# Patient Record
Sex: MALE | Race: ASIAN | NOT HISPANIC OR LATINO | Employment: UNEMPLOYED
[De-identification: names, ages, dates, MRNs, and addresses within clinical notes are randomized per-mention and may not be internally consistent; named-entity substitution may affect disease eponyms.]

---

## 2017-01-18 ENCOUNTER — RECORDS - HEALTHEAST (OUTPATIENT)
Dept: ADMINISTRATIVE | Facility: OTHER | Age: 27
End: 2017-01-18

## 2017-02-15 ENCOUNTER — RECORDS - HEALTHEAST (OUTPATIENT)
Dept: ADMINISTRATIVE | Facility: OTHER | Age: 27
End: 2017-02-15

## 2017-02-24 ENCOUNTER — COMMUNICATION - HEALTHEAST (OUTPATIENT)
Dept: FAMILY MEDICINE | Facility: CLINIC | Age: 27
End: 2017-02-24

## 2017-02-24 DIAGNOSIS — E55.9 UNSPECIFIED VITAMIN D DEFICIENCY: ICD-10-CM

## 2017-03-15 ENCOUNTER — RECORDS - HEALTHEAST (OUTPATIENT)
Dept: ADMINISTRATIVE | Facility: OTHER | Age: 27
End: 2017-03-15

## 2017-03-19 ENCOUNTER — COMMUNICATION - HEALTHEAST (OUTPATIENT)
Dept: FAMILY MEDICINE | Facility: CLINIC | Age: 27
End: 2017-03-19

## 2017-03-19 DIAGNOSIS — M79.2 NEUROPATHIC PAIN: ICD-10-CM

## 2017-03-26 ENCOUNTER — COMMUNICATION - HEALTHEAST (OUTPATIENT)
Dept: FAMILY MEDICINE | Facility: CLINIC | Age: 27
End: 2017-03-26

## 2017-03-26 DIAGNOSIS — Z91.09 OTHER ALLERGY, OTHER THAN TO MEDICINAL AGENTS: ICD-10-CM

## 2017-04-18 ENCOUNTER — RECORDS - HEALTHEAST (OUTPATIENT)
Dept: ADMINISTRATIVE | Facility: OTHER | Age: 27
End: 2017-04-18

## 2017-05-22 ENCOUNTER — RECORDS - HEALTHEAST (OUTPATIENT)
Dept: ADMINISTRATIVE | Facility: OTHER | Age: 27
End: 2017-05-22

## 2017-07-08 ENCOUNTER — COMMUNICATION - HEALTHEAST (OUTPATIENT)
Dept: FAMILY MEDICINE | Facility: CLINIC | Age: 27
End: 2017-07-08

## 2017-07-08 DIAGNOSIS — Z91.09 OTHER ALLERGY, OTHER THAN TO MEDICINAL AGENTS: ICD-10-CM

## 2017-07-17 ENCOUNTER — RECORDS - HEALTHEAST (OUTPATIENT)
Dept: ADMINISTRATIVE | Facility: OTHER | Age: 27
End: 2017-07-17

## 2017-07-24 ENCOUNTER — RECORDS - HEALTHEAST (OUTPATIENT)
Dept: ADMINISTRATIVE | Facility: OTHER | Age: 27
End: 2017-07-24

## 2017-08-07 ENCOUNTER — COMMUNICATION - HEALTHEAST (OUTPATIENT)
Dept: FAMILY MEDICINE | Facility: CLINIC | Age: 27
End: 2017-08-07

## 2017-08-07 DIAGNOSIS — M79.2 NEUROPATHIC PAIN: ICD-10-CM

## 2017-10-12 ENCOUNTER — COMMUNICATION - HEALTHEAST (OUTPATIENT)
Dept: FAMILY MEDICINE | Facility: CLINIC | Age: 27
End: 2017-10-12

## 2017-10-12 DIAGNOSIS — M79.2 NEUROPATHIC PAIN: ICD-10-CM

## 2017-10-29 ENCOUNTER — RECORDS - HEALTHEAST (OUTPATIENT)
Dept: ADMINISTRATIVE | Facility: OTHER | Age: 27
End: 2017-10-29

## 2017-12-14 ENCOUNTER — COMMUNICATION - HEALTHEAST (OUTPATIENT)
Dept: FAMILY MEDICINE | Facility: CLINIC | Age: 27
End: 2017-12-14

## 2017-12-14 DIAGNOSIS — M79.2 NEUROPATHIC PAIN: ICD-10-CM

## 2017-12-25 ENCOUNTER — COMMUNICATION - HEALTHEAST (OUTPATIENT)
Dept: FAMILY MEDICINE | Facility: CLINIC | Age: 27
End: 2017-12-25

## 2017-12-25 DIAGNOSIS — J30.9 AR (ALLERGIC RHINITIS): ICD-10-CM

## 2018-02-14 ENCOUNTER — COMMUNICATION - HEALTHEAST (OUTPATIENT)
Dept: FAMILY MEDICINE | Facility: CLINIC | Age: 28
End: 2018-02-14

## 2018-02-14 DIAGNOSIS — M79.2 NEUROPATHIC PAIN: ICD-10-CM

## 2018-02-19 ENCOUNTER — COMMUNICATION - HEALTHEAST (OUTPATIENT)
Dept: FAMILY MEDICINE | Facility: CLINIC | Age: 28
End: 2018-02-19

## 2018-02-19 ENCOUNTER — RECORDS - HEALTHEAST (OUTPATIENT)
Dept: ADMINISTRATIVE | Facility: OTHER | Age: 28
End: 2018-02-19

## 2018-04-14 ENCOUNTER — COMMUNICATION - HEALTHEAST (OUTPATIENT)
Dept: FAMILY MEDICINE | Facility: CLINIC | Age: 28
End: 2018-04-14

## 2018-04-14 DIAGNOSIS — M79.2 NEUROPATHIC PAIN: ICD-10-CM

## 2018-05-19 ENCOUNTER — COMMUNICATION - HEALTHEAST (OUTPATIENT)
Dept: FAMILY MEDICINE | Facility: CLINIC | Age: 28
End: 2018-05-19

## 2018-05-19 DIAGNOSIS — M79.2 NEUROPATHIC PAIN: ICD-10-CM

## 2018-05-21 ENCOUNTER — COMMUNICATION - HEALTHEAST (OUTPATIENT)
Dept: FAMILY MEDICINE | Facility: CLINIC | Age: 28
End: 2018-05-21

## 2018-07-26 ENCOUNTER — COMMUNICATION - HEALTHEAST (OUTPATIENT)
Dept: FAMILY MEDICINE | Facility: CLINIC | Age: 28
End: 2018-07-26

## 2018-07-26 DIAGNOSIS — J30.9 AR (ALLERGIC RHINITIS): ICD-10-CM

## 2018-08-14 ENCOUNTER — RECORDS - HEALTHEAST (OUTPATIENT)
Dept: ADMINISTRATIVE | Facility: OTHER | Age: 28
End: 2018-08-14

## 2018-09-25 ENCOUNTER — HOSPITAL ENCOUNTER (OUTPATIENT)
Dept: MRI IMAGING | Facility: CLINIC | Age: 28
Discharge: HOME | End: 2018-09-25
Attending: PSYCHIATRY & NEUROLOGY

## 2018-09-25 ENCOUNTER — HOSPITAL ENCOUNTER (OUTPATIENT)
Dept: RADIOLOGY | Facility: CLINIC | Age: 28
Discharge: HOME | End: 2018-09-25
Attending: PSYCHIATRY & NEUROLOGY

## 2018-09-25 DIAGNOSIS — G95.0 SYRINX OF SPINAL CORD (H): ICD-10-CM

## 2018-09-25 DIAGNOSIS — G91.9 HYDROCEPHALUS (H): ICD-10-CM

## 2018-09-26 ENCOUNTER — COMMUNICATION - HEALTHEAST (OUTPATIENT)
Dept: NEUROSURGERY | Facility: CLINIC | Age: 28
End: 2018-09-26

## 2018-10-09 ENCOUNTER — OFFICE VISIT - HEALTHEAST (OUTPATIENT)
Dept: NEUROSURGERY | Facility: CLINIC | Age: 28
End: 2018-10-09

## 2018-10-09 DIAGNOSIS — M54.2 NECK PAIN: ICD-10-CM

## 2018-10-09 ASSESSMENT — MIFFLIN-ST. JEOR: SCORE: 1575

## 2019-05-21 ENCOUNTER — COMMUNICATION - HEALTHEAST (OUTPATIENT)
Dept: NEUROSURGERY | Facility: CLINIC | Age: 29
End: 2019-05-21

## 2019-05-21 ENCOUNTER — AMBULATORY - HEALTHEAST (OUTPATIENT)
Dept: NEUROSURGERY | Facility: CLINIC | Age: 29
End: 2019-05-21

## 2019-05-21 DIAGNOSIS — G95.0 SYRINGOMYELIA AND SYRINGOBULBIA (H): ICD-10-CM

## 2019-06-05 ENCOUNTER — COMMUNICATION - HEALTHEAST (OUTPATIENT)
Dept: NEUROSURGERY | Facility: CLINIC | Age: 29
End: 2019-06-05

## 2019-06-24 ENCOUNTER — HOSPITAL ENCOUNTER (OUTPATIENT)
Dept: CT IMAGING | Facility: CLINIC | Age: 29
Discharge: HOME | End: 2019-06-24
Attending: NEUROLOGICAL SURGERY

## 2019-06-24 ENCOUNTER — HOSPITAL ENCOUNTER (OUTPATIENT)
Dept: RADIOLOGY | Facility: CLINIC | Age: 29
Discharge: HOME | End: 2019-06-24
Attending: NEUROLOGICAL SURGERY

## 2019-06-24 ENCOUNTER — HOSPITAL ENCOUNTER (OUTPATIENT)
Dept: MRI IMAGING | Facility: CLINIC | Age: 29
Discharge: HOME | End: 2019-06-24
Attending: NEUROLOGICAL SURGERY

## 2019-06-24 DIAGNOSIS — G95.0 SYRINGOMYELIA AND SYRINGOBULBIA (H): ICD-10-CM

## 2019-06-25 ENCOUNTER — OFFICE VISIT - HEALTHEAST (OUTPATIENT)
Dept: NEUROSURGERY | Facility: CLINIC | Age: 29
End: 2019-06-25

## 2019-06-25 DIAGNOSIS — Z98.2 S/P VP SHUNT: ICD-10-CM

## 2019-06-25 DIAGNOSIS — G95.0 SYRINX (H): ICD-10-CM

## 2019-09-23 ENCOUNTER — COMMUNICATION - HEALTHEAST (OUTPATIENT)
Dept: FAMILY MEDICINE | Facility: CLINIC | Age: 29
End: 2019-09-23

## 2019-09-23 ENCOUNTER — HOSPITAL ENCOUNTER (OUTPATIENT)
Dept: CT IMAGING | Facility: CLINIC | Age: 29
Discharge: HOME | End: 2019-09-23
Attending: NEUROLOGICAL SURGERY

## 2019-09-23 ENCOUNTER — HOSPITAL ENCOUNTER (OUTPATIENT)
Dept: RADIOLOGY | Facility: CLINIC | Age: 29
Discharge: HOME | End: 2019-09-23
Attending: NEUROLOGICAL SURGERY

## 2019-09-23 ENCOUNTER — HOSPITAL ENCOUNTER (OUTPATIENT)
Dept: MRI IMAGING | Facility: CLINIC | Age: 29
Discharge: HOME | End: 2019-09-23
Attending: NEUROLOGICAL SURGERY

## 2019-09-23 DIAGNOSIS — Z98.2 S/P VP SHUNT: ICD-10-CM

## 2019-09-23 DIAGNOSIS — G95.0 SYRINX (H): ICD-10-CM

## 2019-09-23 DIAGNOSIS — J30.9 AR (ALLERGIC RHINITIS): ICD-10-CM

## 2019-09-24 ENCOUNTER — OFFICE VISIT - HEALTHEAST (OUTPATIENT)
Dept: NEUROSURGERY | Facility: CLINIC | Age: 29
End: 2019-09-24

## 2019-09-24 DIAGNOSIS — G95.0 SYRINX (H): ICD-10-CM

## 2019-09-24 DIAGNOSIS — Z98.2 S/P VP SHUNT: ICD-10-CM

## 2019-09-24 ASSESSMENT — MIFFLIN-ST. JEOR: SCORE: 1575

## 2019-10-08 ENCOUNTER — OFFICE VISIT - HEALTHEAST (OUTPATIENT)
Dept: FAMILY MEDICINE | Facility: CLINIC | Age: 29
End: 2019-10-08

## 2019-10-08 DIAGNOSIS — H69.93 DYSFUNCTION OF BOTH EUSTACHIAN TUBES: ICD-10-CM

## 2019-10-08 DIAGNOSIS — F32.2 MAJOR DEPRESSIVE DISORDER, SINGLE EPISODE, SEVERE (H): ICD-10-CM

## 2019-10-08 DIAGNOSIS — J30.9 AR (ALLERGIC RHINITIS): ICD-10-CM

## 2019-10-08 DIAGNOSIS — F29 PSYCHOSIS, UNSPECIFIED PSYCHOSIS TYPE (H): ICD-10-CM

## 2019-10-08 DIAGNOSIS — E55.9 VITAMIN D DEFICIENCY: ICD-10-CM

## 2019-10-08 ASSESSMENT — MIFFLIN-ST. JEOR: SCORE: 1779.69

## 2019-10-08 ASSESSMENT — PATIENT HEALTH QUESTIONNAIRE - PHQ9: SUM OF ALL RESPONSES TO PHQ QUESTIONS 1-9: 20

## 2020-03-06 ENCOUNTER — OFFICE VISIT - HEALTHEAST (OUTPATIENT)
Dept: FAMILY MEDICINE | Facility: CLINIC | Age: 30
End: 2020-03-06

## 2020-03-06 DIAGNOSIS — N31.9 NEUROGENIC BLADDER: ICD-10-CM

## 2020-03-06 DIAGNOSIS — G95.0 SYRINGOMYELIA (H): ICD-10-CM

## 2020-03-06 DIAGNOSIS — M79.2 NEUROPATHIC PAIN: ICD-10-CM

## 2020-03-06 DIAGNOSIS — Q07.01 ARNOLD-CHIARI MALFORMATION, TYPE II (H): ICD-10-CM

## 2020-03-30 ENCOUNTER — COMMUNICATION - HEALTHEAST (OUTPATIENT)
Dept: FAMILY MEDICINE | Facility: CLINIC | Age: 30
End: 2020-03-30

## 2020-04-01 ENCOUNTER — RECORDS - HEALTHEAST (OUTPATIENT)
Dept: ADMINISTRATIVE | Facility: OTHER | Age: 30
End: 2020-04-01

## 2020-04-29 ENCOUNTER — COMMUNICATION - HEALTHEAST (OUTPATIENT)
Dept: FAMILY MEDICINE | Facility: CLINIC | Age: 30
End: 2020-04-29

## 2020-05-04 ENCOUNTER — RECORDS - HEALTHEAST (OUTPATIENT)
Dept: ADMINISTRATIVE | Facility: OTHER | Age: 30
End: 2020-05-04

## 2020-05-06 ENCOUNTER — COMMUNICATION - HEALTHEAST (OUTPATIENT)
Dept: FAMILY MEDICINE | Facility: CLINIC | Age: 30
End: 2020-05-06

## 2020-05-12 ENCOUNTER — OFFICE VISIT - HEALTHEAST (OUTPATIENT)
Dept: FAMILY MEDICINE | Facility: CLINIC | Age: 30
End: 2020-05-12

## 2020-05-12 DIAGNOSIS — M79.2 NEUROPATHIC PAIN: ICD-10-CM

## 2020-05-12 DIAGNOSIS — Q07.01 ARNOLD-CHIARI MALFORMATION, TYPE II (H): ICD-10-CM

## 2020-05-12 DIAGNOSIS — G95.0 SYRINGOMYELIA (H): ICD-10-CM

## 2020-05-12 ASSESSMENT — PATIENT HEALTH QUESTIONNAIRE - PHQ9: SUM OF ALL RESPONSES TO PHQ QUESTIONS 1-9: 9

## 2020-05-14 ENCOUNTER — RECORDS - HEALTHEAST (OUTPATIENT)
Dept: ADMINISTRATIVE | Facility: OTHER | Age: 30
End: 2020-05-14

## 2020-07-17 ENCOUNTER — HOME CARE/HOSPICE - HEALTHEAST (OUTPATIENT)
Dept: HOME HEALTH SERVICES | Facility: HOME HEALTH | Age: 30
End: 2020-07-17

## 2020-07-17 ENCOUNTER — OFFICE VISIT - HEALTHEAST (OUTPATIENT)
Dept: FAMILY MEDICINE | Facility: CLINIC | Age: 30
End: 2020-07-17

## 2020-07-17 DIAGNOSIS — E78.5 HYPERLIPIDEMIA, UNSPECIFIED HYPERLIPIDEMIA TYPE: ICD-10-CM

## 2020-07-17 DIAGNOSIS — M79.2 NEUROPATHIC PAIN: ICD-10-CM

## 2020-07-17 DIAGNOSIS — L84 CORN OR CALLUS: ICD-10-CM

## 2020-07-17 DIAGNOSIS — K60.2 ANAL FISSURE: ICD-10-CM

## 2020-07-17 DIAGNOSIS — K13.0 ANGULAR CHEILITIS: ICD-10-CM

## 2020-07-17 DIAGNOSIS — R74.01 ELEVATED ALANINE AMINOTRANSFERASE (ALT) LEVEL: ICD-10-CM

## 2020-07-17 DIAGNOSIS — F32.2 MAJOR DEPRESSIVE DISORDER, SINGLE EPISODE, SEVERE (H): ICD-10-CM

## 2020-07-17 DIAGNOSIS — R29.6 RECURRENT FALLS: ICD-10-CM

## 2020-07-17 DIAGNOSIS — D64.9 ANEMIA, UNSPECIFIED TYPE: ICD-10-CM

## 2020-07-17 DIAGNOSIS — L85.3 DRY SKIN: ICD-10-CM

## 2020-07-17 DIAGNOSIS — L29.9 PRURITUS, UNSPECIFIED: ICD-10-CM

## 2020-07-17 DIAGNOSIS — G95.0 SYRINGOMYELIA (H): ICD-10-CM

## 2020-07-17 DIAGNOSIS — E55.9 VITAMIN D DEFICIENCY: ICD-10-CM

## 2020-07-17 DIAGNOSIS — Q07.01 ARNOLD-CHIARI MALFORMATION, TYPE II (H): ICD-10-CM

## 2020-07-17 DIAGNOSIS — E74.89 OTHER SPECIFIED DISORDERS OF CARBOHYDRATE METABOLISM (H): ICD-10-CM

## 2020-07-17 DIAGNOSIS — Z00.00 ROUTINE GENERAL MEDICAL EXAMINATION AT A HEALTH CARE FACILITY: ICD-10-CM

## 2020-07-17 LAB
ALBUMIN SERPL-MCNC: 4.2 G/DL (ref 3.5–5)
ALP SERPL-CCNC: 73 U/L (ref 45–120)
ALT SERPL W P-5'-P-CCNC: 57 U/L (ref 0–45)
ANION GAP SERPL CALCULATED.3IONS-SCNC: 11 MMOL/L (ref 5–18)
AST SERPL W P-5'-P-CCNC: 39 U/L (ref 0–40)
BILIRUB SERPL-MCNC: 0.3 MG/DL (ref 0–1)
BUN SERPL-MCNC: 14 MG/DL (ref 8–22)
CALCIUM SERPL-MCNC: 10.4 MG/DL (ref 8.5–10.5)
CHLORIDE BLD-SCNC: 103 MMOL/L (ref 98–107)
CHOLEST SERPL-MCNC: 292 MG/DL
CO2 SERPL-SCNC: 28 MMOL/L (ref 22–31)
CREAT SERPL-MCNC: 0.98 MG/DL (ref 0.7–1.3)
ERYTHROCYTE [DISTWIDTH] IN BLOOD BY AUTOMATED COUNT: 12.1 % (ref 11–14.5)
FASTING STATUS PATIENT QL REPORTED: NO
GFR SERPL CREATININE-BSD FRML MDRD: >60 ML/MIN/1.73M2
GLUCOSE BLD-MCNC: 107 MG/DL (ref 70–125)
HBA1C MFR BLD: 5.6 % (ref 3.5–6)
HCT VFR BLD AUTO: 43.8 % (ref 40–54)
HDLC SERPL-MCNC: 65 MG/DL
HGB BLD-MCNC: 14.6 G/DL (ref 14–18)
LDLC SERPL CALC-MCNC: 196 MG/DL
MCH RBC QN AUTO: 28.1 PG (ref 27–34)
MCHC RBC AUTO-ENTMCNC: 33.4 G/DL (ref 32–36)
MCV RBC AUTO: 84 FL (ref 80–100)
PLATELET # BLD AUTO: 330 THOU/UL (ref 140–440)
PMV BLD AUTO: 7.3 FL (ref 7–10)
POTASSIUM BLD-SCNC: 5.1 MMOL/L (ref 3.5–5)
PROT SERPL-MCNC: 8 G/DL (ref 6–8)
RBC # BLD AUTO: 5.2 MILL/UL (ref 4.4–6.2)
SODIUM SERPL-SCNC: 142 MMOL/L (ref 136–145)
TRIGL SERPL-MCNC: 153 MG/DL
TSH SERPL DL<=0.005 MIU/L-ACNC: 4.69 UIU/ML (ref 0.3–5)
WBC: 8.5 THOU/UL (ref 4–11)

## 2020-07-17 ASSESSMENT — PATIENT HEALTH QUESTIONNAIRE - PHQ9: SUM OF ALL RESPONSES TO PHQ QUESTIONS 1-9: 19

## 2020-07-20 LAB — 25(OH)D3 SERPL-MCNC: 27.2 NG/ML (ref 30–80)

## 2020-07-21 ENCOUNTER — OFFICE VISIT - HEALTHEAST (OUTPATIENT)
Dept: PODIATRY | Facility: CLINIC | Age: 30
End: 2020-07-21

## 2020-07-21 ENCOUNTER — COMMUNICATION - HEALTHEAST (OUTPATIENT)
Dept: FAMILY MEDICINE | Facility: CLINIC | Age: 30
End: 2020-07-21

## 2020-07-21 DIAGNOSIS — G58.9 MONONEURITIS: ICD-10-CM

## 2020-07-21 DIAGNOSIS — G58.9 COMPRESSION NEUROPATHY: ICD-10-CM

## 2020-07-21 DIAGNOSIS — G57.53 TARSAL TUNNEL SYNDROME OF BOTH LOWER EXTREMITIES: ICD-10-CM

## 2020-07-22 ENCOUNTER — COMMUNICATION - HEALTHEAST (OUTPATIENT)
Dept: HOME HEALTH SERVICES | Facility: HOME HEALTH | Age: 30
End: 2020-07-22

## 2020-07-22 ENCOUNTER — HOME CARE/HOSPICE - HEALTHEAST (OUTPATIENT)
Dept: HOME HEALTH SERVICES | Facility: HOME HEALTH | Age: 30
End: 2020-07-22

## 2020-07-23 ENCOUNTER — OFFICE VISIT - HEALTHEAST (OUTPATIENT)
Dept: FAMILY MEDICINE | Facility: CLINIC | Age: 30
End: 2020-07-23

## 2020-07-23 ENCOUNTER — COMMUNICATION - HEALTHEAST (OUTPATIENT)
Dept: HOME HEALTH SERVICES | Facility: HOME HEALTH | Age: 30
End: 2020-07-23

## 2020-07-23 DIAGNOSIS — E87.5 HYPERKALEMIA: ICD-10-CM

## 2020-07-23 DIAGNOSIS — E78.5 HYPERLIPIDEMIA, UNSPECIFIED HYPERLIPIDEMIA TYPE: ICD-10-CM

## 2020-07-23 DIAGNOSIS — R79.89 ABNORMAL LFTS (LIVER FUNCTION TESTS): ICD-10-CM

## 2020-07-24 ENCOUNTER — HOME CARE/HOSPICE - HEALTHEAST (OUTPATIENT)
Dept: HOME HEALTH SERVICES | Facility: HOME HEALTH | Age: 30
End: 2020-07-24

## 2020-07-25 ENCOUNTER — COMMUNICATION - HEALTHEAST (OUTPATIENT)
Dept: HOME HEALTH SERVICES | Facility: HOME HEALTH | Age: 30
End: 2020-07-25

## 2020-07-26 ENCOUNTER — HOME CARE/HOSPICE - HEALTHEAST (OUTPATIENT)
Dept: HOME HEALTH SERVICES | Facility: HOME HEALTH | Age: 30
End: 2020-07-26

## 2020-07-27 ENCOUNTER — HOME CARE/HOSPICE - HEALTHEAST (OUTPATIENT)
Dept: HOME HEALTH SERVICES | Facility: HOME HEALTH | Age: 30
End: 2020-07-27

## 2020-07-28 ENCOUNTER — HOME CARE/HOSPICE - HEALTHEAST (OUTPATIENT)
Dept: HOME HEALTH SERVICES | Facility: HOME HEALTH | Age: 30
End: 2020-07-28

## 2020-07-28 ENCOUNTER — COMMUNICATION - HEALTHEAST (OUTPATIENT)
Dept: HOME HEALTH SERVICES | Facility: HOME HEALTH | Age: 30
End: 2020-07-28

## 2020-07-28 ENCOUNTER — AMBULATORY - HEALTHEAST (OUTPATIENT)
Dept: FAMILY MEDICINE | Facility: CLINIC | Age: 30
End: 2020-07-28

## 2020-07-28 DIAGNOSIS — Z79.899 HIGH RISK MEDICATION USE: ICD-10-CM

## 2020-07-29 ENCOUNTER — HOME CARE/HOSPICE - HEALTHEAST (OUTPATIENT)
Dept: HOME HEALTH SERVICES | Facility: HOME HEALTH | Age: 30
End: 2020-07-29

## 2020-07-30 ENCOUNTER — HOME CARE/HOSPICE - HEALTHEAST (OUTPATIENT)
Dept: HOME HEALTH SERVICES | Facility: HOME HEALTH | Age: 30
End: 2020-07-30

## 2020-07-31 ENCOUNTER — HOME CARE/HOSPICE - HEALTHEAST (OUTPATIENT)
Dept: HOME HEALTH SERVICES | Facility: HOME HEALTH | Age: 30
End: 2020-07-31

## 2020-08-03 ENCOUNTER — HOME CARE/HOSPICE - HEALTHEAST (OUTPATIENT)
Dept: HOME HEALTH SERVICES | Facility: HOME HEALTH | Age: 30
End: 2020-08-03

## 2020-08-04 ENCOUNTER — HOME CARE/HOSPICE - HEALTHEAST (OUTPATIENT)
Dept: HOME HEALTH SERVICES | Facility: HOME HEALTH | Age: 30
End: 2020-08-04

## 2020-08-05 ENCOUNTER — AMBULATORY - HEALTHEAST (OUTPATIENT)
Dept: LAB | Facility: CLINIC | Age: 30
End: 2020-08-05

## 2020-08-05 ENCOUNTER — HOME CARE/HOSPICE - HEALTHEAST (OUTPATIENT)
Dept: HOME HEALTH SERVICES | Facility: HOME HEALTH | Age: 30
End: 2020-08-05

## 2020-08-05 DIAGNOSIS — E55.9 VITAMIN D DEFICIENCY: ICD-10-CM

## 2020-08-05 DIAGNOSIS — R74.01 ELEVATED ALANINE AMINOTRANSFERASE (ALT) LEVEL: ICD-10-CM

## 2020-08-05 LAB
ALBUMIN SERPL-MCNC: 4.1 G/DL (ref 3.5–5)
ALP SERPL-CCNC: 66 U/L (ref 45–120)
ALT SERPL W P-5'-P-CCNC: 22 U/L (ref 0–45)
ANION GAP SERPL CALCULATED.3IONS-SCNC: 12 MMOL/L (ref 5–18)
AST SERPL W P-5'-P-CCNC: 19 U/L (ref 0–40)
BILIRUB SERPL-MCNC: 0.4 MG/DL (ref 0–1)
BUN SERPL-MCNC: 12 MG/DL (ref 8–22)
CALCIUM SERPL-MCNC: 10.1 MG/DL (ref 8.5–10.5)
CHLORIDE BLD-SCNC: 103 MMOL/L (ref 98–107)
CO2 SERPL-SCNC: 26 MMOL/L (ref 22–31)
CREAT SERPL-MCNC: 0.81 MG/DL (ref 0.7–1.3)
GFR SERPL CREATININE-BSD FRML MDRD: >60 ML/MIN/1.73M2
GGT SERPL-CCNC: 33 U/L (ref 0–50)
GLUCOSE BLD-MCNC: 114 MG/DL (ref 70–125)
POTASSIUM BLD-SCNC: 4.7 MMOL/L (ref 3.5–5)
PROT SERPL-MCNC: 7.5 G/DL (ref 6–8)
SODIUM SERPL-SCNC: 141 MMOL/L (ref 136–145)

## 2020-08-06 ENCOUNTER — COMMUNICATION - HEALTHEAST (OUTPATIENT)
Dept: FAMILY MEDICINE | Facility: CLINIC | Age: 30
End: 2020-08-06

## 2020-08-06 ENCOUNTER — HOME CARE/HOSPICE - HEALTHEAST (OUTPATIENT)
Dept: HOME HEALTH SERVICES | Facility: HOME HEALTH | Age: 30
End: 2020-08-06

## 2020-08-06 DIAGNOSIS — E55.9 VITAMIN D DEFICIENCY: ICD-10-CM

## 2020-08-06 LAB — 25(OH)D3 SERPL-MCNC: 22.3 NG/ML (ref 30–80)

## 2020-08-07 ENCOUNTER — HOME CARE/HOSPICE - HEALTHEAST (OUTPATIENT)
Dept: HOME HEALTH SERVICES | Facility: HOME HEALTH | Age: 30
End: 2020-08-07

## 2020-08-10 ENCOUNTER — HOME CARE/HOSPICE - HEALTHEAST (OUTPATIENT)
Dept: HOME HEALTH SERVICES | Facility: HOME HEALTH | Age: 30
End: 2020-08-10

## 2020-08-13 ENCOUNTER — HOME CARE/HOSPICE - HEALTHEAST (OUTPATIENT)
Dept: HOME HEALTH SERVICES | Facility: HOME HEALTH | Age: 30
End: 2020-08-13

## 2020-08-14 ENCOUNTER — HOME CARE/HOSPICE - HEALTHEAST (OUTPATIENT)
Dept: HOME HEALTH SERVICES | Facility: HOME HEALTH | Age: 30
End: 2020-08-14

## 2020-08-14 ENCOUNTER — COMMUNICATION - HEALTHEAST (OUTPATIENT)
Dept: HOME HEALTH SERVICES | Facility: HOME HEALTH | Age: 30
End: 2020-08-14

## 2020-08-18 ENCOUNTER — HOME CARE/HOSPICE - HEALTHEAST (OUTPATIENT)
Dept: HOME HEALTH SERVICES | Facility: HOME HEALTH | Age: 30
End: 2020-08-18

## 2020-08-21 ENCOUNTER — HOME CARE/HOSPICE - HEALTHEAST (OUTPATIENT)
Dept: HOME HEALTH SERVICES | Facility: HOME HEALTH | Age: 30
End: 2020-08-21

## 2020-08-24 ENCOUNTER — HOME CARE/HOSPICE - HEALTHEAST (OUTPATIENT)
Dept: HOME HEALTH SERVICES | Facility: HOME HEALTH | Age: 30
End: 2020-08-24

## 2020-08-27 ENCOUNTER — HOME CARE/HOSPICE - HEALTHEAST (OUTPATIENT)
Dept: HOME HEALTH SERVICES | Facility: HOME HEALTH | Age: 30
End: 2020-08-27

## 2020-08-31 ENCOUNTER — HOME CARE/HOSPICE - HEALTHEAST (OUTPATIENT)
Dept: HOME HEALTH SERVICES | Facility: HOME HEALTH | Age: 30
End: 2020-08-31

## 2020-09-01 ENCOUNTER — COMMUNICATION - HEALTHEAST (OUTPATIENT)
Dept: HOME HEALTH SERVICES | Facility: HOME HEALTH | Age: 30
End: 2020-09-01

## 2020-09-03 ENCOUNTER — HOME CARE/HOSPICE - HEALTHEAST (OUTPATIENT)
Dept: HOME HEALTH SERVICES | Facility: HOME HEALTH | Age: 30
End: 2020-09-03

## 2020-09-08 ENCOUNTER — HOME CARE/HOSPICE - HEALTHEAST (OUTPATIENT)
Dept: HOME HEALTH SERVICES | Facility: HOME HEALTH | Age: 30
End: 2020-09-08

## 2020-09-11 ENCOUNTER — HOME CARE/HOSPICE - HEALTHEAST (OUTPATIENT)
Dept: HOME HEALTH SERVICES | Facility: HOME HEALTH | Age: 30
End: 2020-09-11

## 2020-10-25 ENCOUNTER — COMMUNICATION - HEALTHEAST (OUTPATIENT)
Dept: FAMILY MEDICINE | Facility: CLINIC | Age: 30
End: 2020-10-25

## 2020-10-25 DIAGNOSIS — E55.9 VITAMIN D DEFICIENCY: ICD-10-CM

## 2020-11-13 ENCOUNTER — COMMUNICATION - HEALTHEAST (OUTPATIENT)
Dept: FAMILY MEDICINE | Facility: CLINIC | Age: 30
End: 2020-11-13

## 2020-11-13 DIAGNOSIS — J30.9 AR (ALLERGIC RHINITIS): ICD-10-CM

## 2020-12-15 ENCOUNTER — COMMUNICATION - HEALTHEAST (OUTPATIENT)
Dept: NEUROSURGERY | Facility: CLINIC | Age: 30
End: 2020-12-15

## 2020-12-15 DIAGNOSIS — Z98.2 S/P VP SHUNT: ICD-10-CM

## 2021-01-05 ENCOUNTER — HOSPITAL ENCOUNTER (OUTPATIENT)
Dept: RADIOLOGY | Facility: HOSPITAL | Age: 31
Discharge: HOME | End: 2021-01-05
Attending: NEUROLOGICAL SURGERY

## 2021-01-05 ENCOUNTER — HOSPITAL ENCOUNTER (OUTPATIENT)
Dept: CT IMAGING | Facility: HOSPITAL | Age: 31
Discharge: HOME | End: 2021-01-05
Attending: NEUROLOGICAL SURGERY

## 2021-01-05 DIAGNOSIS — Z98.2 S/P VP SHUNT: ICD-10-CM

## 2021-05-26 VITALS — DIASTOLIC BLOOD PRESSURE: 80 MMHG | SYSTOLIC BLOOD PRESSURE: 132 MMHG | HEART RATE: 96 BPM

## 2021-05-26 VITALS
OXYGEN SATURATION: 98 % | SYSTOLIC BLOOD PRESSURE: 128 MMHG | DIASTOLIC BLOOD PRESSURE: 90 MMHG | TEMPERATURE: 98.7 F | HEART RATE: 90 BPM

## 2021-05-26 VITALS
OXYGEN SATURATION: 94 % | HEART RATE: 83 BPM | TEMPERATURE: 98 F | DIASTOLIC BLOOD PRESSURE: 93 MMHG | SYSTOLIC BLOOD PRESSURE: 144 MMHG

## 2021-05-26 VITALS
DIASTOLIC BLOOD PRESSURE: 74 MMHG | SYSTOLIC BLOOD PRESSURE: 117 MMHG | HEART RATE: 85 BPM | OXYGEN SATURATION: 92 % | TEMPERATURE: 97.5 F

## 2021-05-26 VITALS
HEART RATE: 95 BPM | DIASTOLIC BLOOD PRESSURE: 80 MMHG | TEMPERATURE: 98 F | SYSTOLIC BLOOD PRESSURE: 115 MMHG | OXYGEN SATURATION: 95 %

## 2021-05-26 VITALS
DIASTOLIC BLOOD PRESSURE: 94 MMHG | SYSTOLIC BLOOD PRESSURE: 141 MMHG | TEMPERATURE: 97 F | OXYGEN SATURATION: 95 % | HEART RATE: 82 BPM

## 2021-05-26 VITALS
OXYGEN SATURATION: 97 % | TEMPERATURE: 98 F | DIASTOLIC BLOOD PRESSURE: 93 MMHG | HEART RATE: 82 BPM | SYSTOLIC BLOOD PRESSURE: 126 MMHG

## 2021-05-26 ASSESSMENT — PATIENT HEALTH QUESTIONNAIRE - PHQ9
SUM OF ALL RESPONSES TO PHQ QUESTIONS 1-9: 20
SUM OF ALL RESPONSES TO PHQ QUESTIONS 1-9: 9

## 2021-05-27 VITALS
SYSTOLIC BLOOD PRESSURE: 119 MMHG | RESPIRATION RATE: 18 BRPM | DIASTOLIC BLOOD PRESSURE: 67 MMHG | HEART RATE: 76 BPM | OXYGEN SATURATION: 98 %

## 2021-05-27 VITALS
HEART RATE: 76 BPM | RESPIRATION RATE: 20 BRPM | TEMPERATURE: 98.4 F | DIASTOLIC BLOOD PRESSURE: 98 MMHG | SYSTOLIC BLOOD PRESSURE: 130 MMHG

## 2021-05-27 VITALS
SYSTOLIC BLOOD PRESSURE: 121 MMHG | HEART RATE: 85 BPM | OXYGEN SATURATION: 96 % | TEMPERATURE: 97.1 F | DIASTOLIC BLOOD PRESSURE: 64 MMHG

## 2021-05-27 VITALS
TEMPERATURE: 98 F | HEART RATE: 94 BPM | DIASTOLIC BLOOD PRESSURE: 86 MMHG | OXYGEN SATURATION: 95 % | SYSTOLIC BLOOD PRESSURE: 125 MMHG

## 2021-05-27 VITALS
DIASTOLIC BLOOD PRESSURE: 69 MMHG | OXYGEN SATURATION: 97 % | SYSTOLIC BLOOD PRESSURE: 110 MMHG | TEMPERATURE: 98.9 F | HEART RATE: 104 BPM

## 2021-05-27 VITALS
OXYGEN SATURATION: 97 % | SYSTOLIC BLOOD PRESSURE: 117 MMHG | TEMPERATURE: 98 F | DIASTOLIC BLOOD PRESSURE: 88 MMHG | HEART RATE: 47 BPM

## 2021-05-27 VITALS
OXYGEN SATURATION: 97 % | TEMPERATURE: 99 F | SYSTOLIC BLOOD PRESSURE: 124 MMHG | HEART RATE: 87 BPM | DIASTOLIC BLOOD PRESSURE: 93 MMHG

## 2021-05-27 VITALS
SYSTOLIC BLOOD PRESSURE: 114 MMHG | TEMPERATURE: 98 F | HEART RATE: 78 BPM | DIASTOLIC BLOOD PRESSURE: 92 MMHG | OXYGEN SATURATION: 97 %

## 2021-05-27 VITALS
DIASTOLIC BLOOD PRESSURE: 74 MMHG | HEART RATE: 85 BPM | RESPIRATION RATE: 18 BRPM | SYSTOLIC BLOOD PRESSURE: 129 MMHG | OXYGEN SATURATION: 96 %

## 2021-05-27 VITALS
DIASTOLIC BLOOD PRESSURE: 85 MMHG | HEART RATE: 80 BPM | TEMPERATURE: 98.4 F | SYSTOLIC BLOOD PRESSURE: 120 MMHG | OXYGEN SATURATION: 98 %

## 2021-05-27 VITALS
HEART RATE: 109 BPM | SYSTOLIC BLOOD PRESSURE: 136 MMHG | DIASTOLIC BLOOD PRESSURE: 86 MMHG | OXYGEN SATURATION: 94 % | TEMPERATURE: 98 F

## 2021-05-27 VITALS
TEMPERATURE: 97.2 F | DIASTOLIC BLOOD PRESSURE: 85 MMHG | SYSTOLIC BLOOD PRESSURE: 118 MMHG | HEART RATE: 83 BPM | RESPIRATION RATE: 14 BRPM | OXYGEN SATURATION: 100 %

## 2021-05-27 VITALS
TEMPERATURE: 98.4 F | DIASTOLIC BLOOD PRESSURE: 88 MMHG | HEART RATE: 84 BPM | OXYGEN SATURATION: 98 % | SYSTOLIC BLOOD PRESSURE: 118 MMHG

## 2021-05-27 VITALS
DIASTOLIC BLOOD PRESSURE: 89 MMHG | SYSTOLIC BLOOD PRESSURE: 115 MMHG | OXYGEN SATURATION: 98 % | TEMPERATURE: 98.6 F | HEART RATE: 84 BPM

## 2021-05-27 VITALS — OXYGEN SATURATION: 95 % | HEART RATE: 81 BPM | TEMPERATURE: 98 F

## 2021-05-27 VITALS
OXYGEN SATURATION: 98 % | RESPIRATION RATE: 18 BRPM | HEART RATE: 82 BPM | SYSTOLIC BLOOD PRESSURE: 110 MMHG | DIASTOLIC BLOOD PRESSURE: 73 MMHG

## 2021-05-27 ASSESSMENT — PATIENT HEALTH QUESTIONNAIRE - PHQ9: SUM OF ALL RESPONSES TO PHQ QUESTIONS 1-9: 19

## 2021-05-29 NOTE — TELEPHONE ENCOUNTER
Breezy called on behalf of his brother Pacheco to set up an appointment to see Dr. Easton because he is having headaches and neck pain. I made an appointment for 6/25/19 with Dr. Easton. Please order any imaging needed for the upcoming appointment. Please call Breezy with any questions. Best number to reach: [phone]

## 2021-05-29 NOTE — TELEPHONE ENCOUNTER
Phoned in Valium 5 mg #2 to take 1 tab PO 1 hour prior to MRI, may repeat another 1 tab 30 minutes prior to test prn.   Aida Marques LPN

## 2021-05-30 NOTE — PROGRESS NOTES
Dear Dr. Nicolas:    I had the pleasure seeing Pacheco David in follow-up in my neurosurgery clinic.  Pacheco is been having difficulty with balance issues more than his baseline.  He also states that during the wintertime he has pain along his neck where the catheter can be palpated.    On physical exam patient is very pleasant and appropriate  Speech is clear fluent,  and appropriate but slow  Face is symmetric throughout the forehead eyes and mouth  Extraocular muscles are intact bilaterally  Strength is full throughout the upper and lower extremities  Gait is widebased  Difficulty with tandem gait  Finger to nose touch is intact bilaterally  Gait is nonantalgic    CT of the head without contrast dated 6/24/2019 shows increase in caliber compared to 6/15/2015 but slightly decreased compared to 6/9/2015.  Per my review, no report from radiology is available for review, of the cervical spine and thoracic spine MRI the syringobulbia has decreased significantly in the cervical thoracic syrinx is decreased however compared to his last MRI from 2018 the syringe he does have increased slightly in size.  I will turn down out of shunt down to 0.5 and repeat a head CT and cervical and thoracic spine MRI without contrast in 3 months.   Thank you for giving me the opportunity to see this very pleasant patient.  If you have any questions about him or any other patients please feel free to contact me.  Of note I spent greater than 25 minutes counseling the patient regarding his pathology and treatment plan, greater than 50 percent of which was spent in direct counseling.     Michael Easton MD, FAANS

## 2021-05-30 NOTE — PROGRESS NOTES
Pacheco David has a history of Chiari decompression and  shunt placement. Also, shunt revision in 1/2015.  Last seen on 10/9/2018. Strata shunt setting at 1.0.  Today he returns in follow up with head CT, skull xrays, cervical and thoracic MRI.  He presents with a chief complaint of dizziness and gait disturbance. He gets not positional occipital HA. Denies any other neurological signs or symptoms. Speech is fluent. Cognition is intact. Uses a cane for safety.        PROCEDURE:  Reprogram Strata Shunt from 1.0 to 0.5.  Reservoir easily identified, using template valve position is marked.  Using Strata self verifying , shunt adjusted to 0.5.   Patient at neurologic baseline throughout procedure.      FINDINGS:  Shunt final setting at 0.5     COMPLICATIONS:  None    Jane Blas RN, CNRN

## 2021-06-01 NOTE — PROGRESS NOTES
Dear Dr. Nicolas:  I had the pleasure of seeing Pacheco David in follow-up in my neurosurgery clinic.  We had turned down his valve to is 0.5 and recently reevaluated his spinal syrinx.  His most recent MRI dated 9/23 showed decrease in the caliber of the previously seen syrinx status post Chiari decompression throughout his cervical thoracic spine.  Head CT showed stable ventricle size.  He denies any headaches but states that he has had progressively increasing pain along his shunt tract in his neck behind his ear and he hits his neck until the pain improves.    On physical exam patient is very pleasant and appropriate  Speech is clear slow but appropriate  Face is symmetric throughout the forehead eyes and mouth  Extraocular muscles are intact bilaterally  He ambulates with a walker  Gait is wide based  Shunt bubble depresses and refills well    Assessment and plan:  Pacheco is a very pleasant 28-year-old with shunted hydrocephalus and a Chiari decompression and holocord syrinx his syrinx is improving.  However given his pain along his shunt track I would like to do a evaluation of the shunt with an aspiration of fluid to send for studies as well as radionucleotide study to make sure his shunt is working optimally.  Once this is done I will review it and make recommendations as needed.  Of note our nurse did reset his shunt valve back to 0.5 from 1.5 after the MRI.   Thank you for giving me the opportunity to see this very pleasant patient.  If you have any questions about Pacheco or any other patients please feel free to contact me.  Of note I spent greater than 30 minutes counseling the patient regarding his pathology and treatment plan.    Michael Easton MD, FAANS

## 2021-06-01 NOTE — PROGRESS NOTES
PROCEDURE:  Verify Shunt at 0.5  Sour John easily identified, using template valve position is marked.  Using Strata self verifying , shunt confirmed at 0.5.   Patient at neurologic baseline throughout procedure.      FINDINGS:  Shunt final setting at 0.5     Jane Blas RN, CNRN

## 2021-06-01 NOTE — PROGRESS NOTES
Pacheco is here to f/u on imaging. He states he still has some pain on right side of neck and hits his neck until it goes numb and cannot feel pain anymore.   Quinton,CMA

## 2021-06-01 NOTE — TELEPHONE ENCOUNTER
RN cannot approve Refill Request    RN can NOT refill this medication Protocol failed and NO refill given.         Geneva Musa, [facility] Triage/Med Refill 9/23/2019    Requested Prescriptions   Pending Prescriptions Disp Refills     fluticasone propionate (FLONASE) 50 mcg/actuation nasal spray [Pharmacy Med Name: FLUTICASONE PROP 50 MCG SPR 50 KAYY] 16 g 10     Sig: USE 1 SPRAY IN EACH NOSTRIL EVERY DAY/ TXHUA HNUB TXAU 1 LWM CARLOS TXA SAB QHOV NTSWG       Nasal Steroid Refill Protocol Failed - 9/23/2019  9:36 AM        Failed - Patient has had office visit/physical in last 2 years     Last office visit with prescriber/PCP: Visit date not found OR same dept: Visit date not found OR same specialty: 11/3/2016 Lary Nicolas MD Last physical: Visit date not found Last MTM visit: Visit date not found    Next appt within 3 mo: Visit date not found  Next physical within 3 mo: Visit date not found  Prescriber OR PCP: Lary Nicolas MD  Last diagnosis associated with med order: 1. AR (allergic rhinitis)  - fluticasone propionate (FLONASE) 50 mcg/actuation nasal spray [Pharmacy Med Name: FLUTICASONE PROP 50 MCG SPR 50 KAYY]; USE 1 SPRAY IN EACH NOSTRIL EVERY DAY/ TXHUA HNUB TXAU 1 LWM CARLOS TXHUA SAB QHOV NTSWG  Dispense: 16 g; Refill: 10     If protocol passes may refill for 12 months if within 3 months of last provider visit (or a total of 15 months).

## 2021-06-02 VITALS — HEIGHT: 66 IN | BODY MASS INDEX: 23.46 KG/M2 | WEIGHT: 146 LBS

## 2021-06-02 NOTE — PROGRESS NOTES
Office Visit  [facility]  Date of Service: 10/08/2019      Subjective   Pacheco David is a 29 y.o. male who presents for referral    Sees therapist - Michael Segundo @ [facility].  Has been talking to therapist about medicine for depression.  Feeling depressed - a couple for a couple of years    Symptoms:   Poor sleep, but also sleeps too much sometimes  Overeating  Feels feels depressed  Feels tired  Feels guilt  Psychomotor slowing present  Hears laughing at night, sees vague things flying in peripheral vision, gets command hallucinations that tell him to go die or it's better off not living  Suicidal ideation: thinks about disappearing, jumping off a bridge, cutting wrists.  No past attempts.  No thoughts of hurting others.  Protective: family, pain level.  No alcohol or drug use.    History of medication: a long time ago, 17 yo. Doesn't know which one. Noticed that he felt really depressed really fast, hit himself. On abilify for pain, doesn't help  Pain clinic is prescribing Abilify -it is prescribed by Dr. Randhawa' NP Gloria    Health history is significant for having an Arnold-Chiari malformation with  shunt, syringomyelia, neuropathic pain/neuropathic bowel/neuropathic bladder.  He is on a controlled substance agreement for chronic pain through the pain clinic.    Allergies  Eustachian tube dysfunction  Flonase didn't help  Feels like a bubble in right ear    Little interest or pleasure in doing things: Several days  Feeling down, depressed, or hopeless: More than half the days  Trouble falling or staying asleep, or sleeping too much: Nearly every day  Feeling tired or having little energy: More than half the days  Poor appetite or overeating: Nearly every day  Feeling bad about yourself - or that you are a failure or have let yourself or your family down: Nearly every day  Trouble concentrating on things, such as reading the newspaper or watching television: More than half the  "days  Moving or speaking so slowly that other people could have noticed. Or the opposite - being so fidgety or restless that you have been moving around a lot more than usual: More than half the days  Thoughts that you would be better off dead, or of hurting yourself in some way: More than half the days  PHQ-9 Total Score: 20  If you checked off any problems, how difficult have these problems made it for you to do your work, take care of things at home, or get along with other people?: Very difficult    Objective   /70 (Patient Site: Left Arm, Patient Position: Sitting, Cuff Size: Adult Large)   Pulse 90   Temp 98  F (36.7  C) (Oral)   Resp 18   Ht 5' 5.75\" (1.67 m)   Wt 192 lb (87.1 kg)   BMI 31.23 kg/m     He reports that he has never smoked. He has never used smokeless tobacco.    Gen: Alert, no apparent distress.  Ears: canals clear, tympanic membranes clear bilateral clear effusion  Eyes: no conjunctivitis.   Sinuses: non-tender.  Nose: normal mucosa.   Mouth: adequate dentition.   Tonsils/oropharynx: no tonsillar hypertrophy, normal oropharynx.   Neck: no significant lymphadenopathy, thyroid not enlarged, not tender.    Heart: Regular rate and rhythm, no murmurs.  Lungs: Clear to auscultation bilaterally, no increased work of breathing.  Abdomen: Soft, non-tender, non-distended, bowel sounds normal.  Extremities: No clubbing, cyanosis, edema.    Results for orders placed or performed during the hospital encounter of 08/20/15   Hemoglobin   Result Value Ref Range    Hemoglobin 14.5 14.0 - 18.0 g/dL     No results found.    Assessment & Plan     1. Depression with psychosis and passive suicidal ideation.  Present for a couple of years.  Patient is interested in seeing a psychiatrist for medication management.  Currently on Abilify.  Long discussion with him today about plan for suicidal ideation.  That if he does feel like he might hurt himself he should call 911 and go to emergency room medially.  " He agrees that he will do this, should it become necessary.  Urgent referral made for psychiatry.  2. Eustachian tube dysfunction in both ears.  Prescription given for Nasonex.  Discussed how to use it.  Continue using Claritin daily.      Order Summary                                                      1. Major Depression Severe Single Episode  Ambulatory referral to Psychiatry   2. Psychosis, unspecified psychosis type (H)  Ambulatory referral to Psychiatry   3. AR (allergic rhinitis)  loratadine (CLARITIN) 10 mg tablet    mometasone (NASONEX) 50 mcg/actuation nasal spray   4. Vitamin D deficiency  cholecalciferol, vitamin D3, 5,000 unit capsule   5. Dysfunction of both eustachian tubes  mometasone (NASONEX) 50 mcg/actuation nasal spray      No future appointments.    Completed by: Lary Nicolas M.D., [facility]. 10/10/2019 4:37 PM.  This transcription uses voice recognition software, which may contain typographical errors.

## 2021-06-03 VITALS
HEIGHT: 66 IN | DIASTOLIC BLOOD PRESSURE: 61 MMHG | BODY MASS INDEX: 23.46 KG/M2 | OXYGEN SATURATION: 91 % | WEIGHT: 146 LBS | HEART RATE: 88 BPM | SYSTOLIC BLOOD PRESSURE: 93 MMHG

## 2021-06-03 VITALS
BODY MASS INDEX: 30.86 KG/M2 | RESPIRATION RATE: 18 BRPM | SYSTOLIC BLOOD PRESSURE: 110 MMHG | HEART RATE: 90 BPM | DIASTOLIC BLOOD PRESSURE: 70 MMHG | TEMPERATURE: 98 F | HEIGHT: 66 IN | WEIGHT: 192 LBS

## 2021-06-04 VITALS
DIASTOLIC BLOOD PRESSURE: 82 MMHG | BODY MASS INDEX: 32.36 KG/M2 | SYSTOLIC BLOOD PRESSURE: 116 MMHG | RESPIRATION RATE: 18 BRPM | WEIGHT: 199 LBS | HEART RATE: 114 BPM

## 2021-06-04 VITALS
DIASTOLIC BLOOD PRESSURE: 79 MMHG | BODY MASS INDEX: 34.64 KG/M2 | OXYGEN SATURATION: 99 % | WEIGHT: 213 LBS | RESPIRATION RATE: 16 BRPM | SYSTOLIC BLOOD PRESSURE: 113 MMHG | HEART RATE: 114 BPM

## 2021-06-05 ENCOUNTER — RECORDS - HEALTHEAST (OUTPATIENT)
Dept: NEUROLOGY | Facility: CLINIC | Age: 31
End: 2021-06-05

## 2021-06-05 DIAGNOSIS — G95.89: ICD-10-CM

## 2021-06-05 DIAGNOSIS — Z98.2 PRESENCE OF CEREBROSPINAL FLUID DRAINAGE DEVICE: ICD-10-CM

## 2021-06-05 DIAGNOSIS — Q05.4 SPINA BIFIDA WITH HYDROCEPHALUS (H): ICD-10-CM

## 2021-06-06 NOTE — PROGRESS NOTES
Office Visit  [facility]  Date of Service: 03/06/2020      Subjective   Pacheco David is a 29 y.o. male who presents for Foot and ankle brace and medical bed request    Pacheco is here today for face to face evaluation for DME. He needs a mattress for his hospital bed, dorsistrap leg braces, and pre-lubricated urinary catheters for intermittent self-catheterization.    Mattress for medical bed    Has a hospital bed at home, but the mattress is disintegrating. Needs new mattress.    Current mattress is too soft (due to crumbling/disintegration due to age) to allow positioning, is impairing transfers, and is causing pain.    He needs a hospital bed because it helps with positioning for pain and sleep apnea, helps with jose r lift transfers. Elevating feet decreases nerve pain in his feet.    Diagnosis for this DME item  Arnold-Chiari malformation, type II [Q07.01]  Neuropathic pain [M79.2]  Obstructive sleep apnea, severe [G47.33]    This equipment is medically necessary.    Leg braces, Dorsistrap type    Needed due to inability to dorsiflex foot.    Without brace, he cannot lift his foot and trips over his feet with transfers    His impaired dorsiflexion is due to:  Arnold-Chiari malformation, type II (H) [Q07.01]     Urinary Catheter, pre-lubricated    Patient self-catheterizes for neurogenic bladder    Diagnosis:  Neurogenic bladder [N31.9]    Lubricated catheters for intermittent self-catheterization are medically necessary.          No data recorded   Objective   /82 (Patient Site: Left Arm, Patient Position: Sitting, Cuff Size: Adult Large)   Pulse (!) 114   Resp 18   Wt 199 lb (90.3 kg)   BMI 32.36 kg/m     He reports that he has never smoked. He has never used smokeless tobacco.    Gen: Alert, no apparent distress.  Heart: Regular rate and rhythm, no murmurs.  Lungs: Clear to auscultation bilaterally, no increased work of breathing.  Abdomen: Soft, non-tender,  non-distended, bowel sounds normal.  Extremities: No clubbing, cyanosis, edema.    Results for orders placed or performed during the hospital encounter of 08/20/15   Hemoglobin   Result Value Ref Range    Hemoglobin 14.5 14.0 - 18.0 g/dL     No results found.    Assessment & Plan     1. Face to face encounter for medically necessary DME supplies, as above. Orders placed and faxed to [facility].       Order Summary                                                      1. Arnold-Chiari malformation, type II (H)  Bed, [facility] [employer] (Hospital Bed Mattress)    Ankle/Foot DME: Ankle Brace (Dorsistrap); Bilateral   2. Syringomyelia (H)  Bed, [facility] [employer] (Hospital Bed Mattress)    Ankle/Foot DME: Ankle Brace (Dorsistrap); Bilateral   3. Neuropathic pain  Bed, [facility] [employer] (Hospital Bed Mattress)    Ankle/Foot DME: Ankle Brace (Dorsistrap); Bilateral   4. Neurogenic bladder  Catheterization supplies      Future Appointments   Date Time Provider Department Center   4/28/2020  9:40 AM Lary Nicolas MD [facility]       Completed by: Lary Nicolas M.D., [facility]. 3/7/2020 12:06 PM.  This transcription uses voice recognition software, which may contain typographical errors.

## 2021-06-07 NOTE — TELEPHONE ENCOUNTER
Who is calling:  Fred montano [facility]  Reason for Call:  [employer] sent an order request to Dr. Nicolas for a hospital mattress on 4/13/20.  They have not received it back yet.  Patient waiting for his new mattress.  Please call [employer] to get the form faxed again to Dr. Nicolas.  Please let Fred know when the matter has been completed.   Date of last appointment with primary care: 3/6/20  Okay to leave a detailed message: Yes

## 2021-06-07 NOTE — TELEPHONE ENCOUNTER
Who is calling:    Fred Cueva    Reason for Call:   Needs recent face to face 03/06/2020  regarding the need for a hospital mattress faxed to [facility].    Arnold-Chiari malformation, type II (H)   Syringomyelia (H)   Neuropathic pain     Date of last appointment with primary care: 03/06/2020    Okay to leave a detailed message: Yes    Please fax face to face to:  [facility]   Fax: [phone]

## 2021-06-07 NOTE — TELEPHONE ENCOUNTER
Please see clinic visit note 3/6/2020 for face to face encounter. Fill in [facility] [employer] forms as able and give to doctor in clinic to complete.

## 2021-06-07 NOTE — TELEPHONE ENCOUNTER
No mattress orders found in media. Found hospital matress order in orders, writer faxed to [facility] medical done.

## 2021-06-07 NOTE — TELEPHONE ENCOUNTER
Orders being requested:   1. Hospital Mattress- Single    Reason service is needed/diagnosis: Pain    When are orders needed by: ASAP    Where to send Orders:  [facility]   Fax: [phone]     Okay to leave detailed message?  Yes    [employer] will be faxing an order for bed.  Please complete, sign and fax along with the recent face to face.

## 2021-06-08 NOTE — TELEPHONE ENCOUNTER
Virtual visit 5/12/20 with needed documentation.     This was printed out and attached to [employer] order form to fax back as requested.

## 2021-06-08 NOTE — PROGRESS NOTES
"Pacheco David is a 29 y.o. male who is being evaluated via a billable video visit.      The patient has been notified of following:     \"This video visit will be conducted via a call between you and your physician/provider. We have found that certain health care needs can be provided without the need for an in-person physical exam.  This service lets us provide the care you need with a video conversation.  If a prescription is necessary we can send it directly to your pharmacy.  If lab work is needed we can place an order for that and you can then stop by our lab to have the test done at a later time.    Video visits are billed at different rates depending on your insurance coverage. Please reach out to your insurance provider with any questions.    If during the course of the call the physician/provider feels a video visit is not appropriate, you will not be charged for this service.\"    Patient has given verbal consent to a Video visit? Yes    Patient would like to receive their AVS by AVS Preference: Mail a copy.    Patient would like the video invitation sent by: Text to cell phone: [phone]    Will anyone else be joining your video visit? No        Video Start Time: 12:06 PM    ____________________________    Virtual Visit - Video Encounter  [facility]  Date of Service: 5/12/2020    Subjective:    Pacheco calls today for a video visit for face to face encounter for his hospital bed.    He needs a hospital bed due to the following conditions:  Arnold-Chiari Malformation, type II. ICD-10: Q07.01  Neuropathic pain. ICD-10: M79.2    Pacheco's Arnold-Chiari Malformation is due to scarring from meningitis in 2009. As a result, he has chronic neuropathic pain of his head, arms, legs, abdomen, neck and upper back. Sometimes lower/mid back. He has impaired mobility and sensory perception as a result of neurologic injury.    His condition makes a hospital bed with foam mattress overlay medically " necessary.    Pain symptoms are positional and a traditional bed does not allow for adequate positioning to limit severity of pain. He has had significant improvement in pain with a hospital bed in the past. A foam overlay has been very helpful for preventing pressure sores and reducing his neuropathic pain.    Leg braces came through today. He uses those because he can't dorsiflex his feet.     Objective:  Speech is normal  Patient is calm    Assessment & Plan:  1. Face to face encounter (via video due to Covid-19) for hospital bed with foam mattress overlay. In summary, this is needed due to: Arnold-Chiari Malformation, type II. ICD-10: Q07.01 and Neuropathic pain. ICD-10: M79.2 to adequately reduce pain.      Order Summary                                                      No diagnosis found.   Future Appointments   Date Time Provider Department Center   7/10/2020  2:20 PM Lary Nicolas MD [facility]       Completed by: Lary Nicolas M.D., [facility]. 5/12/2020 12:12 PM.  This transcription uses voice recognition software, which may contain typographical errors.  ____________________________    Video-Visit Details    Type of service:  Video Visit    Video End Time (time video stopped): 12:21 PM  Originating Location (pt. Location): Home    Distant Location (provider location):  [facility] FAMILY [facility]ICINE/OB     Platform used for Video Visit: Cheri Nicolas MD
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
## 2021-06-09 NOTE — PROGRESS NOTES
Patient goal: improve strength, gait, balance, and ADLs   Reason for this episode: referral from PCP due to weakness and frequent falls, difficulty with ADLs  Pertinent medical history: Epilepsy and recurrent seizures, ataxia, dysphagia,  shunt, Anemia, neuropathic pain, neurogenic bladder, neurogenic bowel, vitamin D deficiency, major depression, Arnold-Chiari malformation type II, Syringomyelia, tarsal tunnel syndrome of B LEs  Prec  autions/safety: (ex. contact precautions, cognition, activity restrictions, falls, surgical precautions, etc.):falls risk, seizure precautions.  Prior Level of Function: Pt reports declining function over last few years.   Current Level of Function: Pt notes having more difficulty with feeding self, brusing teeth, dressing, bathing, and toilet hygiene. Pt also has hx frequent falls. Pt uses walker for longer distance ambualtion, otherwi  se mostly uses cane. He does have PWC that he says he uses mostly in the house, due to not having a way to use wheelchair out of house/transport his wheelchair (vehicle does not have this capability). Pt reports he gets tired easily. It appears MD has ordered new B AFOs, but Pt is unsure of where this order was sent. Moisés 13/28. Pt ambulated 50 feet x 1 and 20 feet x 2 without AD, reaching for walls and other furniture, decreased orion  t clearance, B foot drop, unsteady gait, decreased gait speed. Pt performed all transfers with SBA. MMT: B shoulder flexion and abduction 4/5, B elbow flexion and extension 4+/5, B hip flexion 4/5, B hip abduction 4/5, B hip adduction 4+/5, L knee extension 4+/5, R knee extension 3+/5, L knee flexion 4/5, R knee flexion 3+/5, B dorsiflexion 0/5. Pt does not have regular exercise program he follows. Pt has hx chronic pain.   Persons pres  ent for visit: pt, PT  Home Environment: single family home   Assistance Available: PCA services 8 hours per day, 7 days per week. Otherwise has 24/7 assist from family.     Multidisciplinary Plan/Follow up Needs:   PT: 2x3 PT/PTA for gait, balance, strength, transfers, and endurance training, education in HEP and falls prevention.  OT: eval and treat for ADL safety.   SLP: eval and treat for swallow.   HHA: Pt declines need for HHA.

## 2021-06-09 NOTE — TELEPHONE ENCOUNTER
Honestly, most of the abnormalities are due to diet and not being able to exercise.  He hasn't had the abnormal liver tests and low potassium before, so we'll just recheck those, to see if they're really a problem.

## 2021-06-09 NOTE — TELEPHONE ENCOUNTER
Request for Orders    Who s Requesting: Home Care Physical Therapist    Orders being requested: Home Care Physical Therapy 2x/week x 3 weeks for gait, balance, strength, transfers, and endurance training, education in HEP and falls prevention.  OT eval and treat for ADL safety  SLP eval and treat for swallow      Where to send Orders: please reply to this message    Thank you,  Ana Tenorio, PT

## 2021-06-09 NOTE — TELEPHONE ENCOUNTER
Called and spoke with Pacheco David , Message was given, Pacheco David  understood, Patient wants to know if his medications are the cause of all his abnormal labs.     Okay to leave a detail message.
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
## 2021-06-09 NOTE — PROGRESS NOTES
"Pacheco David is a 29 y.o. male who is being evaluated via a billable telephone visit.      The patient has been notified of following:     \"This telephone visit will be conducted via a call between you and your physician/provider. We have found that certain health care needs can be provided without the need for a physical exam.  This service lets us provide the care you need with a short phone conversation.  If a prescription is necessary we can send it directly to your pharmacy.  If lab work is needed we can place an order for that and you can then stop by our lab to have the test done at a later time.    Telephone visits are billed at different rates depending on your insurance coverage. During this emergency period, for some insurers they may be billed the same as an in-person visit.  Please reach out to your insurance provider with any questions.    If during the course of the call the physician/provider feels a telephone visit is not appropriate, you will not be charged for this service.\"    Patient has given verbal consent to a Telephone visit? Yes    What phone number would you like to be contacted at? [phone]    Patient would like to receive their AVS by AVS Preference: Mail a copy.    ____________________________    Virtual Visit - Telephone Encounter  [facility]  Family Medicine  Date of Service: 7/23/2020    Subjective:    Pacheco is calling today to discuss lab results.    Potassium is mildly elevated. Has not been high in the past  ALT mildly elevated. Has not been high in the past. Denies alcohol use.  He is worried that his medications are causing problems  High cholesterol - went up significantly. Reviewed nutrition recommendations. He admits to eating a lot of cholesterol and would like to modify his diet before adding pills.    Objective:  There were no vitals taken for this visit.   Speech is normal  Patient is calm  Physical on 07/17/2020   Component Date Value Ref " Range Status     Hemoglobin A1c 07/17/2020 5.6  3.5 - 6.0 % Final     Cholesterol 07/17/2020 292* <=199 mg/dL Final     Triglycerides 07/17/2020 153* <=149 mg/dL Final     HDL Cholesterol 07/17/2020 65  >=40 mg/dL Final     LDL Calculated 07/17/2020 196* <=129 mg/dL Final     Patient Fasting > 8hrs? 07/17/2020 No   Final     Sodium 07/17/2020 142  136 - 145 mmol/L Final     Potassium 07/17/2020 5.1* 3.5 - 5.0 mmol/L Final     Chloride 07/17/2020 103  98 - 107 mmol/L Final     CO2 07/17/2020 28  22 - 31 mmol/L Final     Anion Gap, Calculation 07/17/2020 11  5 - 18 mmol/L Final     Glucose 07/17/2020 107  70 - 125 mg/dL Final     BUN 07/17/2020 14  8 - 22 mg/dL Final     Creatinine 07/17/2020 0.98  0.70 - 1.30 mg/dL Final     GFR MDRD Af Amer 07/17/2020 >60  >60 mL/min/1.73m2 Final     GFR MDRD Non Af Amer 07/17/2020 >60  >60 mL/min/1.73m2 Final     Bilirubin, Total 07/17/2020 0.3  0.0 - 1.0 mg/dL Final     Calcium 07/17/2020 10.4  8.5 - 10.5 mg/dL Final     Protein, Total 07/17/2020 8.0  6.0 - 8.0 g/dL Final     Albumin 07/17/2020 4.2  3.5 - 5.0 g/dL Final     Alkaline Phosphatase 07/17/2020 73  45 - 120 U/L Final     AST 07/17/2020 39  0 - 40 U/L Final     ALT 07/17/2020 57* 0 - 45 U/L Final     WBC 07/17/2020 8.5  4.0 - 11.0 thou/uL Final     RBC 07/17/2020 5.20  4.40 - 6.20 mill/uL Final     Hemoglobin 07/17/2020 14.6  14.0 - 18.0 g/dL Final     Hematocrit 07/17/2020 43.8  40.0 - 54.0 % Final     MCV 07/17/2020 84  80 - 100 fL Final     MCH 07/17/2020 28.1  27.0 - 34.0 pg Final     MCHC 07/17/2020 33.4  32.0 - 36.0 g/dL Final     RDW 07/17/2020 12.1  11.0 - 14.5 % Final     Platelets 07/17/2020 330  140 - 440 thou/uL Final     MPV 07/17/2020 7.3  7.0 - 10.0 fL Final     TSH 07/17/2020 4.69  0.30 - 5.00 uIU/mL Final     Vitamin D, Total (25-Hydroxy) 07/17/2020 27.2* 30.0 - 80.0 ng/mL Final     No results found.   Assessment & Plan:  1. Mild hyperkalemia - recheck  2. Mild ALT elevation - recheck  3. Hyperlipidemia  - likely nutritional - patient will modify his diet first, and if lipids remain elevated, he will start cholesterol medicine.      Order Summary                                                      No diagnosis found.   Future Appointments   Date Time Provider Department Center   7/23/2020 10:20 AM Lary Nicolas MD [facility]   7/24/2020  1:00 AM Moni Zapata, MS, CCC-SLP [facility] None   7/24/2020  6:00 AM Gila Licona, PTA [facility] None   7/28/2020  6:00 AM Gila Licona, PTA [facility] None   7/30/2020 To Be Determined Gila Licona, PTA [facility] None   8/4/2020 To Be Determined Gila Licona, PTA [facility] None   8/5/2020  2:00 PM [facility]   8/6/2020 To Be Determined Shahla Hdz, PT [facility] None   7/19/2021 12:00 PM Lary Nicolas MD [facility]       Completed by: Lary Nicolas M.D., [facility]. 7/23/2020 10:12 AM.  This transcription uses voice recognition software, which may contain typographical errors.  ____________________________  Start call: 10:12 AM   End call: 10:25 AM       Phone call duration:  13 minutes

## 2021-06-09 NOTE — TELEPHONE ENCOUNTER
Left vocie mail for patient to call clinic back okay to relay message . Please help set up CSS appointment

## 2021-06-09 NOTE — TELEPHONE ENCOUNTER
Request for Orders    Who s Requesting: Home Care Physical Therapist    Orders being requested: delay in SOC to 7/22/2020, per Pt request, having too many appointments on 7/21/2020.     Where to send Orders: please reply to this message  Thank you,  Ana Tenorio, PT

## 2021-06-09 NOTE — TELEPHONE ENCOUNTER
DOD: Dr. Bales, Medication refill requested. Please authorize medication if appropriate. Thank you.

## 2021-06-09 NOTE — PATIENT INSTRUCTIONS - HE
Come back for labs 8/5/20 - please come fasting (water only)!    Limit red meat, skin on meat, fatty dairy (like whole milk, butter, and ice cream), and fried food.    Limit sugary foods and beverages (like juice, pop and sports drinks).    Eat more fruits, vegetables, whole grain (like brown rice, whole wheat bread, and oatmeal), fish and nuts.    Exercise. The goal is 150 minutes of moderate intensity exercise per week.    Lose weight. Even losing 10% of your weight will significantly improve your cholesterol

## 2021-06-09 NOTE — TELEPHONE ENCOUNTER
FYI:  Upon medication reconciliation at home care start of care, this interaction was flagged.    Drug-Drug: methadone and QUEtiapine   The concurrent use of levomethadone or methadone with other agents that prolong the QTc interval may result in potentially life-threatening cardiac arrhythmias, including torsades de pointes.(1,2)   Details Contraindicated Drug Combination    Please reply to this message with any comments or concerns.     Thank you,  Ana Tenorio, PT

## 2021-06-09 NOTE — TELEPHONE ENCOUNTER
This is a request for delay in Home Care Physical Therapy start of care to 7/22/2020, instead of 7/21/2020, due to Pt request.  Please reply to this message.  Thank you,  Ana Tenorio, PT

## 2021-06-09 NOTE — TELEPHONE ENCOUNTER
----- Message from Lary Nicolas MD sent at 7/20/2020 12:59 PM CDT -----  Team - please call patient with results AND send letter:    Your cholesterol is VERY high.   ---I want you to start taking a cholesterol pill, called rosuvastatin 20 mg every day.   ---Eat less fatty meat, eggs, dairy. Eat more fruit, veggies, whole grains, fish, and use olive oil or grapeseed oil for cooking.    Your vitamin D level is low.   ---Make sure that you're taking the vitamin D 5000 units every day.    Your potassium level is a tiny bit high. And your liver test is a little abnormal.   These have not been a problem for you before. So I'd like you to:  ---avoid alcohol  ---recheck labs in 2-3 weeks (oK for lab only appointment).    Your blood sugar is at the top of normal. You don't have diabetes or prediabetes yet.  ---eat less sweets and starches (like rice, noodles, breads, cereal)    Normal thyroid (TSH).  Your blood counts are good.

## 2021-06-10 NOTE — PROGRESS NOTES
HOME HEALTH MISSED VISIT NOTIFICATION (FYI)     Your patient who receives home health services missed a visit on: 7/29/20      Type of service missed: physical therapy    Reason for missed visit (pick applicable reason):          Patient/Family refused (explain):Pt requested only one visit this week as he had other appointments.     Provider(s) to be notified: Lary Nicolas MD, /supervising PT, OT

## 2021-06-10 NOTE — TELEPHONE ENCOUNTER
Request for Orders    Who s Requesting: Home Care Occupational Therapist    Orders being requested: OT  2 visit(s) every 1 week(s) for 3 week(s) for pain management, and ADL/IADL indep and safety.    Please reply ok to this encounter asap if you are ok with the orders I have requested.    Thank you!  Meaghan Rivera, OTR/L  [phone]

## 2021-06-10 NOTE — PROGRESS NOTES
Plan to DC from homecare PT next visit  Last PTA visit: 8/3/20  Non coverage completed (Y/N):  no  Any equipment recommended/ordered (if not obtained by patient, then reason why; ie declined,)   none  HEP given and Level of assist needed (Independent, needs assistance, or non-compliant)):  supine, seated and standing HEP I  Any other disciplines still in that you are aware: OT, SLP  BRIEF summary of progress and possible barriers (any o  ther pertinent information): Amb no Ad/FWW S/I, gildardo S  Barriers: pain

## 2021-06-10 NOTE — PROGRESS NOTES
Your patient was discharged from [facility] on 8/6/2020 because all home PT goals have been met.  Pt will continue strengthening and balance HEP independently. He ambulates outdoor distances safely with use of SEC or walker depending on pain level.  Thank you for allowing us to provide care to this patient!  A Discharge summary is available upon requestâ  [phone].    Thanks,  Jemima Hdz, PT

## 2021-06-10 NOTE — TELEPHONE ENCOUNTER
Message was relayed to the pt.     Dr. Nicolas, per pt has not been taking Vitamin D. Per pt said he was told by someone, not the pharmacy that the Rx was not cover. If you can send another Rx to the pharmacy and per pt will try to go pick it up to see if it is cover. Thanks.

## 2021-06-10 NOTE — TELEPHONE ENCOUNTER
Rx sent.  Pacheco was seen today for test results.    Diagnoses and all orders for this visit:    Vitamin D deficiency  -     ergocalciferol (ERGOCALCIFEROL) 1,250 mcg (50,000 unit) capsule; Take 1 cap by mouth weekly for 8 weeks, then once a month.

## 2021-06-10 NOTE — TELEPHONE ENCOUNTER
Request for Orders    Who s Requesting: Home Care Occupational Therapist    Orders being requested: OT  3 visit(s) every 1 week(s) for 3 week(s)  2 visit(s) every 1 week(s) for 1 week(s) for cognitive assessment, caregiver ed, ADL/IADL indep and safety, UE function, low vision strategies.    Please reply ok to this encounter asap if you are ok with the orders I have requested.    Thank you!  Meaghan Rivera, OTR/L  [phone]

## 2021-06-10 NOTE — TELEPHONE ENCOUNTER
----- Message from Lary Nicolas MD sent at 8/6/2020  4:52 PM CDT -----  Team - please call patient with results.  Please tell Pacheco that his labs look good now.  Normal kidney and liver tests.    He's still a little low on vitamin D. Is he taking the vitamin D pill yet?

## 2021-06-10 NOTE — TELEPHONE ENCOUNTER
FYI: Flagged medication interaction noted at SOC.   Pt reports he has been taking these meds for a while with no issues.  Drug-Drug: methadone and QUEtiapine   The concurrent use of levomethadone or methadone with other agents that prolong the QTc interval may result in potentially life-threatening cardiac arrhythmias, including torsades de pointes.(1,2)   Details Contraindicated Drug Combination    Thank you,  Ana Tenorio, PT

## 2021-06-11 NOTE — TELEPHONE ENCOUNTER
Request for Orders    Who s Requesting: Home Care Occupational Therapist    Orders being requested: OT  2 visit(s) every 1 week(s) for 1 week(s) to finalize teaching on PNE (pain neuroscience education) and ADL/IADL indep and safety.    Please reply ok to this encounter asap if you are ok with the orders I have requested.    Thank you!  Meaghan Rivera, OTR/L  [phone]

## 2021-06-12 NOTE — TELEPHONE ENCOUNTER
RN cannot approve Refill Request    RN can NOT refill this medication Protocol failed and NO refill given. Last office visit: 3/6/2020 Lary Nicolas MD Last Physical: 7/17/2020 Last MTM visit: Visit date not found Last visit same specialty: 3/6/2020 Lary Nicolas MD.  Next visit within 3 mo: Visit date not found  Next physical within 3 mo: Visit date not found      [place] Triage/Med Refill 10/25/2020    Requested Prescriptions   Pending Prescriptions Disp Refills     ergocalciferol (ERGOCALCIFEROL) 1,250 mcg (50,000 unit) capsule [Pharmacy Med Name: VIT D (ERGOCALCIFEROL) 1.25 1.25 MG Capsule] 4 capsule 0     Sig: TAKE 1 PILL BY MOUTH ONE TIME WEEKLY FOR 8 WEEKS THEN TAKE 1 PILL ONCE A MONTH       There is no refill protocol information for this order

## 2021-06-13 NOTE — TELEPHONE ENCOUNTER
Patient's PCA calling regarding shunt pain that the patient is experiencing since having a sleep study last week. They would like a call back to discuss.    Best number to reach Breezy: [phone]

## 2021-06-13 NOTE — TELEPHONE ENCOUNTER
Called and spoke with Pacheco who verbalized concerns regarding on going not positional HA and tenderness on the top of his head. No other symptoms. He will get head CT and skull xrays with CC - recommendations will follow.  Jane Blas RN, CNRN

## 2021-06-16 PROBLEM — K13.0 ANGULAR CHEILITIS: Status: ACTIVE | Noted: 2020-07-17

## 2021-06-16 PROBLEM — J30.9 AR (ALLERGIC RHINITIS): Status: ACTIVE | Noted: 2017-12-26

## 2021-06-20 NOTE — LETTER
Letter by Lary Nicolas MD at      Author: Lary Nicolas MD Service: -- Author Type: --    Filed:  Encounter Date: 3/6/2020 Status: (Other)                    My Depression Action Plan  Name: Pacheco David   Date of Birth 1990  Date: 3/6/2020    My Doctor: Lary Nicolas MD   My Clinic: [facility]  [address] [address]  [phone]          GREEN    ZONE   Good Control    What it looks like:     Things are going generally well. You have normal ups and downs. You may even feel depressed from time to time, but bad moods usually last less than a day.   What you need to do:  1. Continue to care for yourself (see self care plan)  2. Check your depression survival kit and update it as needed  3. Follow your physicians recommendations including any medication.  4. Do not stop taking medication unless you consult with your physician first.           YELLOW         ZONE Getting Worse    What it looks like:     Depression is starting to interfere with your life.     It may be hard to get out of bed; you may be starting to isolate yourself from others.    Symptoms of depression are starting to last most all day and this has happened for several days.     You may have suicidal thoughts but they are not constant.   What you need to do:     1. Call your care team. Your response to treatment will improve if you keep your care team informed of your progress. Yellow periods are signs an adjustment may need to be made.     2. Continue your self-care.  Just get dressed and ready for the day.  Don't give yourself time to talk yourself out of it.    3. Talk to someone in your support network.    4. Open up your depression Depression Self-Care Plan / Wellness kit.           RED    ZONE Medical Alert - Get Help    What it looks like:     Depression is seriously interfering with your life.     You may experience these or other symptoms: You cant get out of bed most days, cant work or  engage in other necessary activities, you have trouble taking care of basic hygiene, or basic responsibilities, thoughts of suicide or death that will not go away, self-injurious behavior.     What you need to do:  1. Call your care team and request a same-day appointment. If they are not available (weekends or after hours) call your local crisis line, emergency room or 911.            Self-Care Plan / Wellness Kit    Self-Care for Depression  Heres the deal. Your body and mind are really not as separate as most people think.  What you do and think affects how you feel and how you feel influences what you do and think. This means if you do things that people who feel good do, it will help you feel better.  Sometimes this is all it takes.  There is also a place for medication and therapy depending on how severe your depression is, so be sure to consult with your medical provider and/ or Behavioral Health Consultant if your symptoms are worsening or not improving.     In order to better manage my stress, I will:    Exercise  Get some form of exercise, every day. This will help reduce pain and release endorphins, the feel good chemicals in your brain. This is almost as good as taking antidepressants!  This is not the same as joining a gym and then never going! (they count on that by the way?) It can be as simple as just going for a walk or doing some gardening, anything that will get you moving.      Hygiene   Maintain good hygiene (get out of bed in the morning, make your bed, brush your teeth, take a shower, and get dressed like you were going to work, even if you are unemployed).  If your clothes don't fit try to get ones that do.    Diet  Strive to eat foods that are good for me, drink plenty of water, and avoid excessive sugar, caffeine, alcohol, and other mood-altering substances.  Some foods that are helpful in depression are: complex carbohydrates, B vitamins, flaxseed, fish or fish oil, fresh fruits and  vegetables.    Psychotherapy  Agree to participate in Individual Therapy (if recommended).    Medication  If prescribed medications, I agree to take them.  Missing doses can result in serious side effects.  I understand that drinking alcohol, or other illicit drug use, may cause potential side effects.  I will not stop my medication abruptly without first discussing it with my provider.    Staying Connected With Others  Stay in touch with my friends, family members, and my primary care provider/team.    Use your imagination  Be creative.  We all have a creative side; it doesnt matter if its oil painting, sand castles, or mud pies! This will also kick up the endorphins.    Witness Beauty  (AKA stop and smell the roses) Take a look outside, even in mid-winter. Notice colors, textures. Watch the squirrels and birds.     Service to others  Be of service to others.  There is always someone else in need.  By helping others we can get out of ourselves and remember the really important things.  This also provides opportunities for practicing all the other parts of the program.    Humor  Laugh and be silly!  Adjust your TV habits for less news and crime-drama and more comedy.    Control your stress  Try breathing deep, massage therapy, biofeedback, and meditation. Find time to relax each day.     Crisis Text Line  http://www.crisistextline.org    The Crisis Text Line serves anyone, in any type of crisis, providing access to free, 24/7 support and information via the medium people already use and trust:    Here's how it works:  1.  Text 795-995 from anywhere in the [place], anytime, about any type of crisis.  2.  A live, trained Crisis Counselor receives the text and responds quickly.  3.  The volunteer Crisis Counselor will help you move from a 'hot moment to a cool moment'.  My support system    Clinic Contact:  Phone number:    Contact 1:  Phone number:    Contact 2:  Phone number:    [employer]/:  Phone  number:    Therapist:  Phone number:    [facility] crisis center:    Phone number:    Other community support:  Phone number:

## 2021-06-20 NOTE — PROGRESS NOTES
Pacheco is here to discuss some pain he has been having for 3 months. He has pain on right side of his neck where shunt tubing is. When he turns his head left or right it hurts more. He denies any HA and no urinary incontinence and gait and balance are stable. He had a shunt revision in 1/2014 by Dr. Easton. Current last setting was 1.0 strata.   [place],CMA

## 2021-06-20 NOTE — PROGRESS NOTES
Dear Dr. Nicolas:  I had the pleasure of seeing Pacheco David in follow-up in my neurosurgery clinic.  As you well aware he is a 28-year-old male with history of Chiari decompression and  shunt placement.  His last revision but was by me in January 2015.  He is doing well except for pain at the neck on the right side where the catheter is and pain at his Chiari decompression site for the last 2 years.  He states this is worse in winter times.  On imaging with MRI of the thoracic and cervical spine MRI he has significant decrease in the size of his syringes  On physical exam patient is very pleasant and appropriate  He has a baseline generalized tremor  Speech is clear fluent and appropriate  Face is symmetric throughout the forehead eyes and mouth  Extraocular muscles are intact bilaterally  Tongue and uvula elevate in the midline  Strength is full throughout the upper and lower extremities  Shunt bubble depresses and refills easily  Inc's are healed without edema or erythema  Gait is nonantalgic    S1 and plan:  Pacheco is a very pleasant 28-year-old with history of  shunt and syringobulbia.  His syringobulbia has decreased significantly.  He has no evidence of shunt malfunction he does have pain along the shunt catheter and at his incision site from his Chiari decompression.  We reset his shunt back to 1.0 as he has a strata valve in place.  I will write a prescription for Medrol Dosepak to see if this can address his pain if it is secondary to underlying inflammation.  He has no evidence of a shunt malfunction or infection.  We will see him back in clinic in 1 year with a full brain C and T-spine MRI without contrast at that time.  She develop any signs or symptoms of shunt malfunction I will see him sooner.  Thank you for giving me the opportunity to see this very pleasant patient.  If you have any questions about him or any other patients please feel free to contact me.  Of note I spent greater than 15 minutes  counseling the patient regarding his pathology and treatment plan, greater than 50 percent of which was spent in direct face to face counseling.    Michael Easton MD, FAANS

## 2021-06-20 NOTE — PROGRESS NOTES
PROCEDURE NOTE:    Verify Strata Shunt at 1.0.  [place] easily identified, using template valve position is marked.  Using Strata self verifying , shunt adjusted from 0.5 to 1.0.   Patient at neurologic baseline throughout procedure.      FINDINGS:  Shunt final setting at 1.0     COMPLICATIONS:  None.    Jane Blas, RN, CNRN

## 2021-07-03 NOTE — ADDENDUM NOTE
Addendum Note by Lary Nicolas MD at 7/17/2020  2:20 PM     Author: Lary Nicolas MD Service: -- Author Type: Physician    Filed: 7/20/2020  1:01 PM Encounter Date: 7/17/2020 Status: Signed    : Lary Nicolas MD (Physician)    Addended by: LARY NICOLAS on: 7/20/2020 01:01 PM        Modules accepted: Orders

## 2021-08-06 NOTE — PATIENT INSTRUCTIONS - HE
Patient Instructions by Lary Nicolas MD at 7/17/2020  2:20 PM     Author: Lary Nicolas MD Service: -- Author Type: Physician    Filed: 7/17/2020  2:38 PM Encounter Date: 7/17/2020 Status: Signed    : Lary Nicolas MD (Physician)         Patient Education     Your Health Risk Assessment indicates you feel you are not in good physical health.    A healthy lifestyle helps keep the body fit and the mind alert. It helps protect you from disease, helps you fight disease, and helps prevent chronic disease (disease that doesn't go away) from getting worse. This is important as you get older and begin to notice twinges in muscles and joints and a decline in the strength and stamina you once took for granted. A healthy lifestyle includes good healthcare, good nutrition, weight control, recreation, and regular exercise. Avoid harmful substances and do what you can to keep safe. Another part of a healthy lifestyle is stay mentally active and socially involved.    Good healthcare     Have a wellness visit every year.     If you have new symptoms, let us know right away. Don't wait until the next checkup.     Take medicines exactly as prescribed and keep your medicines in a safe place. Tell us if your medicine causes problems.   Healthy diet and weight control     Eat 3 or 4 small, nutritious, low-fat, high-fiber meals a day. Include a variety of fruits, vegetables, and whole-grain foods.     Make sure you get enough calcium in your diet. Calcium, vitamin D, and exercise help prevent osteoporosis (bone thinning).     If you live alone, try eating with others when you can. That way you get a good meal and have company while you eat it.     Try to keep a healthy weight. If you eat more calories than your body uses for energy, it will be stored as fat and you will gain weight.     Recreation   Recreation is not limited to sports and team events. It includes any activity that provides relaxation, interest, enjoyment,  and exercise. Recreation provides an outlet for physical, mental, and social energy. It can give a sense of worth and achievement. It can help you stay healthy.       Patient Education     Exercise for a Healthier Heart  You may wonder how you can improve the health of your heart. If youre thinking about exercise, youre on the right track. You dont need to become an athlete, but you do need a certain amount of brisk exercise to help strengthen your heart. If you have been diagnosed with a heart condition, your doctor may recommend exercise to help stabilize your condition. To help make exercise a habit, choose safe, fun activities.       Be sure to check with your health care provider before starting an exercise program.    Why exercise?  Exercising regularly offers many healthy rewards. It can help you do all of the following:    Improve your blood cholesterol levels to help prevent further heart trouble    Lower your blood pressure to help prevent a stroke or heart attack    Control diabetes, or reduce your risk of getting this disease    Improve your heart and lung function    Reach and maintain a healthy weight    Make your muscles stronger and more limber so you can stay active    Prevent falls and fractures by slowing the loss of bone mass (osteoporosis)    Manage stress better  Exercise tips  Ease into your routine. Set small goals. Then build on them.  Exercise on most days. Aim for a total of 150 or more minutes of moderate to  vigorous intensity activity each week. Consider 40 minutes, 3 to 4 times a week. For best results, activity should last for 40 minutes on average. It is OK to work up to the 40 minute period over time. Examples of moderate-intensity activity is walking one mile in 15 minutes or 30 to 45 minutes of yard work.  Step up your daily activity level. Along with your exercise program, try being more active throughout the day. Walk instead of drive. Do more household tasks or yard  work.  Choose one or more activities you enjoy. Walking is one of the easiest things you can do. You can also try swimming, riding a bike, or taking an exercise class.  Stop exercising and call your doctor if you:    Have chest pain or feel dizzy or lightheaded    Feel burning, tightness, pressure, or heaviness in your chest, neck, shoulders, back, or arms    Have unusual shortness of breath    Have increased joint or muscle pain    Have palpitations or an irregular heartbeat      1913-9904 [employer]. [address] [place] [address]. All rights reserved. This information is not intended as a substitute for professional medical care. Always follow your healthcare professional's instructions.         Patient Education   Understanding [place] MyPlate  The USDA (US Department of Agriculture) has guidelines to help you make healthy food choices. These are called MyPlate. MyPlate shows the food groups that make up healthy meals using the image of a place setting. Before you eat, think about the healthiest choices for what to put onto your plate or into your cup or bowl. To learn more about building a healthy plate, visit www.choosemyplate.gov.       The Food Groups    Fruits: Any fruit or 100% fruit juice counts as part of the Fruit Group. Fruits may be fresh, canned, frozen, or dried, and may be whole, cut-up, or pureed. Make half your plate fruits and vegetables.    Vegetables: Any vegetable or 100% vegetable juice counts as a member of the Vegetable Group. Vegetables may be fresh, frozen, canned, or dried. They can be served raw or cooked and may be whole, cut-up, or mashed. Make half your plate fruits and vegetables.     Grains: All foods made from grains are part of the Grains Group. These include wheat, rice, oats, cornmeal, and barley such as bread, pasta, oatmeal, cereal, tortillas, and grits. Grains should be no more than a quarter of your plate. At least half of your grains should be whole  grains.    Protein: This group includes meat, poultry, seafood, beans and peas, eggs, processed soy products (like tofu), nuts (including nut butters), and seeds. Make protein choices no more than a quarter of your plate. Meat and poultry choices should be lean or low fat.    Dairy: All fluid milk products and foods made from milk that contain calcium, like yogurt and cheese are part of the Dairy Group. (Foods that have little calcium, such as cream, butter, and cream cheese, are not part of the group.) Most dairy choices should be low-fat or fat-free.    Oils: These are fats that are liquid at room temperature. They include canola, corn, olive, soybean, and sunflower oil. Foods that are mainly oil include mayonnaise, certain salad dressings, and soft margarines. You should have only 5 to 7 teaspoons of oils a day. You probably already get this much from the food you eat.  Use [employer] to Help Build Your Meals  The SuperTracker can help you plan and track your meals and activity. You can look up individual foods to see or compare their nutritional value. You can get guidelines for what and how much you should eat. You can compare your food choices. And you can assess personal physical activities and see ways you can improve. Go to www.[employer]rionics.gov/supertracker/.    7730-7241 [employer]. [address] [place] [address]. All rights reserved. This information is not intended as a substitute for professional medical care. Always follow your healthcare professional's instructions.           Patient Education   Activities of Daily Living  Your Health Risk Assessment indicates you have difficulties with activities of daily living such as eating, getting dressed, grooming, bathing, walking, or using the toilet. Please make a follow up appointment for us to address this issue in more detail.     Patient Education   Instrumental Activities of Daily Living  Your Health Risk Assessment  indicates you have difficulties with instrumental activities of daily living which include laundry, housekeeping, banking, shopping, using the telephone, food preparation, transportation, or taking your own medications. Please make a follow up appointment for us to address this issue in more detail.    [employer] has resources available on the following website: [email or web address]     Also, here is a local agency that provides help with meals and other assistance:   [facility] Line: [phone]     Patient Education   Signs of Hearing Loss  Hearing loss is a problem shared by many people. In fact, it is one of the most common health conditions, particularly as people age. Most people over age 65 have some hearing loss, and by age 80, almost everyone does. Because hearing loss usually occurs slowly over the years, you may not realize your hearing ability has gotten worse.       Have your hearing checked  Contact your [facility] care provider if you:    Have to strain to hear normal conversation.    Have to watch other peoples faces very carefully to follow what theyre saying.    Need to ask people to repeat what theyve said.    Often misunderstand what people are saying.    Turn the volume of the television or radio up so high that others complain.    Feel that people are mumbling when theyre talking to you.    Find that the effort to hear leaves you feeling tired and irritated.    Notice, when using the phone, that you hear better with 1 ear than the other.    [phone] [employer]. [address] [place] [address]. All rights reserved. This information is not intended as a substitute for professional medical care. Always follow your healthcare professional's instructions.         Patient Education   Your Health Risk Assessment indicates you feel you are not in good emotional health.    Recreation   Recreation is not limited to sports and team events. It includes any  activity that provides relaxation, interest, enjoyment, and exercise. Recreation provides an outlet for physical, mental, and social energy. It can give a sense of worth and achievement. It can help you stay healthy.    Mental Exercise and Social Involvement  Mental and emotional health is as important as physical health. Keep in touch with friends and family. Stay as active as possible. Continue to learn and challenge yourself.   Things you can do to stay mentally active are:    Learn something new, like a foreign language or musical instrument.     Play SCRABBLE or do crossword puzzles. If you cannot find people to play these games with you at home, you can play them with others on your computer through the Internet.     Join a games club--anything from card games to chess or checkers or lawn bowling.     Start a new hobby.     Go back to school.     Volunteer.     Read.     Keep up with world events.       Patient Education   Depression and Suicide in Older Adults  Nearly 2 million older Americans have some type of depression. Sadly, some of them even take their own lives. Yet depression among older adults is often ignored. Learn the warning signs. You may help spare a loved one needless pain. You may also save a life.       What Is Depression?  Depression is a mood disorder that affects the way you think and feel. The most common symptom is a feeling of deep sadness. People who are depressed also may seem tired and listless. And nothing seems to give them pleasure. Its normal to grieve or be sad sometimes. But sadness lessens or passes with time. Depression rarely goes away or improves on its own. Other symptoms of depression are:    Sleeping more or less than normal    Eating more or less than normal    Having headaches, stomachaches, or other pains that dont go away    Feeling nervous, empty, or worthless    Crying a great deal    Thinking or talking about suicide or death    Feeling confused or  forgetful  What Causes It?  The causes of depression arent fully known. Certain chemicals in the brain play a role. Depression does run in families. And life stresses can also trigger depression in some people. That may be the case with older adults. They often face great burdens, such as the death of friends or a spouse. They may have failing health. And they are more likely to be alone, lonely, or poor.  How You Can Help  Often, depressed people may not want to ask for help. When they do, they may be ignored. Or, they may receive the wrong treatment. You can help by showing parents and older friends love and support. If they seem depressed, help them find the right treatment. Talk to your doctor. Or contact a local mental health center, social service agency, or hospital. With modern treatment, no one has to suffer from depression.  Resources:    [employer] [place] of Mental [facility]  211.557.4405  www.nimh.nih.gov    National [place] on Mental Illness  408.680.5217  www.wesley.org    Mental [facility]  255.806.1784  www.[facility].org    National Suicide Hotline  904.414.1795 (800-SUICIDE)      1664-0509 [employer]. [address] [place] [address]. All rights reserved. This information is not intended as a substitute for professional medical care. Always follow your healthcare professional's instructions.         Patient Education   Preventing Falls in the Home  As you get older, falls are more likely. Thats because your reaction time slows. Your muscles and joints may also get stiffer, making them less flexible. Illness, medications, and vision changes can also affect your balance. A fall could leave you unable to live on your own. To make your home safer, follow these tips:    Floors    Put nonskid pads under area rugs.    Remove throw rugs.    Replace worn floor coverings.    Tack carpets firmly to each step on carpeted stairs. Put nonskid strips on the edges of uncarpeted stairs.    Keep  floors and stairs free of clutter and cords.    Arrange furniture so there are clear pathways.    Clean up any spills right away.    Bathrooms    Install grab bars in the tub or shower.    Apply nonskid strips or put a nonskid rubber mat in the tub or shower.    Sit on a bath chair to bathe.    Use bathmats with nonskid backing.    Lighting    Keep a flashlight in each room.    Put a nightlight along the pathway between the bedroom and the bathroom.    8525-7663 [employer]. [address] [place] [address]. All rights reserved. This information is not intended as a substitute for professional medical care. Always follow your healthcare professional's instructions.         Patient Education   Understanding Advance Care Planning  Advance care planning is the process of deciding ones own future medical care. It helps ensure that if you cant speak for yourself, your wishes can still be carried out. The plan is a series of legal documents that note a persons wishes. The documents vary by state. Advance care planning may be done when a person has a serious illness that is expected to get worse. It may be done before major surgery. And it can help you and your family be prepared in case of a major illness or injury. Advance care planning helps with making decisions at these times.       A health care proxy is a person who acts as the voice of a patient when the patient cant speak for himself or herself. The name of this role varies by state. It may be called a Durable Medical Power of  or Durable Power of  for Healthcare. It may be called an agent, surrogate, or advocate. Or it may be called a representative or decision maker. It is an official duty that is identified by a legal document. The document also varies by state.    Why Is Advance Care Planning Important?  If a person communicates their healthcare wishes:    They will be given medical care that matches their values and  goals.    Their family members will not be forced to make decisions in a crisis with no guidance.  Creating a Plan  Making an advance care plan is often done in 3 steps:    Thinking about ones wishes. To create an advance care plan, you should think about what kind of medical treatment you would want if you lose the ability to communicate. Are there any situations in which you would refuse or stop treatment? Are there therapies you would want or not want? And whom do you want to make decisions for you? There are many places to learn more about how to plan for your care. Ask your doctor or  for resources.    Picking a health care proxy. This means choosing a trusted person to speak for you only when you cant speak for yourself. When you cannot make medical decisions, your proxy makes sure the instructions in your advance care plan are followed. A proxy does not make decisions based on his or her own opinions. They must put aside those opinions and values if needed, and carry out your wishes.    Filling out the legal documents. There are several kinds of legal documents for advance care planning. Each one tells health care providers your wishes. The documents may vary by state. They must be signed and may need to be witnessed or notarized. You can cancel or change them whenever you wish. Depending on your state, the documents may include a Healthcare Proxy form, Living Will, Durable Medical Power of , Advance Directive, or others.  The Familys Role  The best help a family can give is to support their loved ones wishes. Open and honest communication is vital. Family should express any concerns they have about the patients choices while the patient can still make decisions.    6993-3811 [employer]. [address] [place] [address], [place] [address]. All rights reserved. This information is not intended as a substitute for professional medical care. Always follow your healthcare professional's  instructions.         Also, [facility] offers a free, downloadable health care directive that allows you to share your treatment choices and personal preferences if you cannot communicate your wishes. It also allows you to appoint another person (called a health care agent) to make health care decisions if you are unable to do so. You can download an advance directive by going here: [email or web address]     Patient Education   Personalized Prevention Plan  You are due for the preventive services outlined below.  Your care team is available to assist you in scheduling these services.  If you have already completed any of these items, please share that information with your care team to update in your medical record.  Health Maintenance   Topic Date Due   ? ADVANCE CARE PLANNING  09/27/2010   ? INFLUENZA VACCINE RULE BASED (1) 08/01/2020   ? MEDICARE ANNUAL WELLNESS VISIT  07/17/2021   ? TD 18+ HE  10/08/2029   ? DEPRESSION ACTION PLAN  Completed   ? HIV SCREENING  Completed   ? HEPATITIS B VACCINES  Completed   ? TDAP ADULT ONE TIME DOSE  Completed

## 2021-10-06 ENCOUNTER — APPOINTMENT (OUTPATIENT)
Dept: RADIOLOGY | Facility: CLINIC | Age: 31
End: 2021-10-06
Attending: EMERGENCY MEDICINE
Payer: MEDICARE

## 2021-10-06 ENCOUNTER — APPOINTMENT (OUTPATIENT)
Dept: CT IMAGING | Facility: CLINIC | Age: 31
End: 2021-10-06
Attending: EMERGENCY MEDICINE
Payer: MEDICARE

## 2021-10-06 ENCOUNTER — HOSPITAL ENCOUNTER (EMERGENCY)
Facility: CLINIC | Age: 31
Discharge: HOME | End: 2021-10-07
Attending: EMERGENCY MEDICINE | Admitting: EMERGENCY MEDICINE
Payer: MEDICARE

## 2021-10-06 ENCOUNTER — OFFICE VISIT (OUTPATIENT)
Dept: FAMILY MEDICINE | Facility: CLINIC | Age: 31
End: 2021-10-06
Payer: MEDICARE

## 2021-10-06 ENCOUNTER — APPOINTMENT (OUTPATIENT)
Dept: ULTRASOUND IMAGING | Facility: CLINIC | Age: 31
End: 2021-10-06
Attending: EMERGENCY MEDICINE
Payer: MEDICARE

## 2021-10-06 VITALS
OXYGEN SATURATION: 97 % | TEMPERATURE: 98.4 F | SYSTOLIC BLOOD PRESSURE: 141 MMHG | RESPIRATION RATE: 28 BRPM | DIASTOLIC BLOOD PRESSURE: 88 MMHG | HEART RATE: 223 BPM

## 2021-10-06 DIAGNOSIS — R11.11 VOMITING WITHOUT NAUSEA, INTRACTABILITY OF VOMITING NOT SPECIFIED, UNSPECIFIED VOMITING TYPE: ICD-10-CM

## 2021-10-06 DIAGNOSIS — R00.0 TACHYCARDIA: Primary | ICD-10-CM

## 2021-10-06 DIAGNOSIS — R11.2 NAUSEA AND VOMITING, INTRACTABILITY OF VOMITING NOT SPECIFIED, UNSPECIFIED VOMITING TYPE: ICD-10-CM

## 2021-10-06 LAB
ALBUMIN SERPL-MCNC: 4.3 G/DL (ref 3.5–5)
ALP SERPL-CCNC: 90 U/L (ref 45–120)
ALT SERPL W P-5'-P-CCNC: 70 U/L (ref 0–45)
ANION GAP SERPL CALCULATED.3IONS-SCNC: 16 MMOL/L (ref 5–18)
AST SERPL W P-5'-P-CCNC: 31 U/L (ref 0–40)
ATRIAL RATE - MUSE: 115 BPM
BASOPHILS # BLD AUTO: 0 10E3/UL (ref 0–0.2)
BASOPHILS NFR BLD AUTO: 0 %
BILIRUB SERPL-MCNC: 0.6 MG/DL (ref 0–1)
BUN SERPL-MCNC: 7 MG/DL (ref 8–22)
CALCIUM SERPL-MCNC: 10.3 MG/DL (ref 8.5–10.5)
CHLORIDE BLD-SCNC: 100 MMOL/L (ref 98–107)
CO2 SERPL-SCNC: 25 MMOL/L (ref 22–31)
CREAT SERPL-MCNC: 0.86 MG/DL (ref 0.7–1.3)
DIASTOLIC BLOOD PRESSURE - MUSE: NORMAL MMHG
EOSINOPHIL # BLD AUTO: 0 10E3/UL (ref 0–0.7)
EOSINOPHIL NFR BLD AUTO: 0 %
ERYTHROCYTE [DISTWIDTH] IN BLOOD BY AUTOMATED COUNT: 14.2 % (ref 10–15)
GFR SERPL CREATININE-BSD FRML MDRD: >90 ML/MIN/1.73M2
GLUCOSE BLD-MCNC: 118 MG/DL (ref 70–125)
HCT VFR BLD AUTO: 46.9 % (ref 40–53)
HGB BLD-MCNC: 15.1 G/DL (ref 13.3–17.7)
HOLD SPECIMEN: NORMAL
IMM GRANULOCYTES # BLD: 0 10E3/UL
IMM GRANULOCYTES NFR BLD: 0 %
INTERPRETATION ECG - MUSE: NORMAL
LIPASE SERPL-CCNC: <9 U/L (ref 0–52)
LYMPHOCYTES # BLD AUTO: 1.3 10E3/UL (ref 0.8–5.3)
LYMPHOCYTES NFR BLD AUTO: 14 %
MAGNESIUM SERPL-MCNC: 1.9 MG/DL (ref 1.8–2.6)
MCH RBC QN AUTO: 26.6 PG (ref 26.5–33)
MCHC RBC AUTO-ENTMCNC: 32.2 G/DL (ref 31.5–36.5)
MCV RBC AUTO: 83 FL (ref 78–100)
MONOCYTES # BLD AUTO: 0.6 10E3/UL (ref 0–1.3)
MONOCYTES NFR BLD AUTO: 6 %
NEUTROPHILS # BLD AUTO: 7.9 10E3/UL (ref 1.6–8.3)
NEUTROPHILS NFR BLD AUTO: 80 %
NRBC # BLD AUTO: 0 10E3/UL
NRBC BLD AUTO-RTO: 0 /100
P AXIS - MUSE: 41 DEGREES
PLATELET # BLD AUTO: 401 10E3/UL (ref 150–450)
POTASSIUM BLD-SCNC: 3.9 MMOL/L (ref 3.5–5)
PR INTERVAL - MUSE: 152 MS
PROT SERPL-MCNC: 8.4 G/DL (ref 6–8)
QRS DURATION - MUSE: 80 MS
QT - MUSE: 304 MS
QTC - MUSE: 420 MS
R AXIS - MUSE: 34 DEGREES
RBC # BLD AUTO: 5.67 10E6/UL (ref 4.4–5.9)
SODIUM SERPL-SCNC: 141 MMOL/L (ref 136–145)
SYSTOLIC BLOOD PRESSURE - MUSE: NORMAL MMHG
T AXIS - MUSE: 38 DEGREES
VENTRICULAR RATE- MUSE: 115 BPM
WBC # BLD AUTO: 9.9 10E3/UL (ref 4–11)

## 2021-10-06 PROCEDURE — 74177 CT ABD & PELVIS W/CONTRAST: CPT

## 2021-10-06 PROCEDURE — 93005 ELECTROCARDIOGRAM TRACING: CPT | Performed by: PHYSICIAN ASSISTANT

## 2021-10-06 PROCEDURE — 71045 X-RAY EXAM CHEST 1 VIEW: CPT

## 2021-10-06 PROCEDURE — 82040 ASSAY OF SERUM ALBUMIN: CPT | Performed by: EMERGENCY MEDICINE

## 2021-10-06 PROCEDURE — 96376 TX/PRO/DX INJ SAME DRUG ADON: CPT

## 2021-10-06 PROCEDURE — 76705 ECHO EXAM OF ABDOMEN: CPT

## 2021-10-06 PROCEDURE — 99285 EMERGENCY DEPT VISIT HI MDM: CPT | Mod: 25

## 2021-10-06 PROCEDURE — 250N000013 HC RX MED GY IP 250 OP 250 PS 637: Performed by: EMERGENCY MEDICINE

## 2021-10-06 PROCEDURE — 93010 ELECTROCARDIOGRAM REPORT: CPT | Mod: OFF | Performed by: INTERNAL MEDICINE

## 2021-10-06 PROCEDURE — 96374 THER/PROPH/DIAG INJ IV PUSH: CPT | Mod: 59

## 2021-10-06 PROCEDURE — 93005 ELECTROCARDIOGRAM TRACING: CPT | Performed by: EMERGENCY MEDICINE

## 2021-10-06 PROCEDURE — 83690 ASSAY OF LIPASE: CPT | Performed by: EMERGENCY MEDICINE

## 2021-10-06 PROCEDURE — 70450 CT HEAD/BRAIN W/O DYE: CPT

## 2021-10-06 PROCEDURE — 36415 COLL VENOUS BLD VENIPUNCTURE: CPT | Performed by: EMERGENCY MEDICINE

## 2021-10-06 PROCEDURE — 258N000003 HC RX IP 258 OP 636: Performed by: EMERGENCY MEDICINE

## 2021-10-06 PROCEDURE — 93005 ELECTROCARDIOGRAM TRACING: CPT | Mod: 76

## 2021-10-06 PROCEDURE — 99214 OFFICE O/P EST MOD 30 MIN: CPT | Mod: 25 | Performed by: PHYSICIAN ASSISTANT

## 2021-10-06 PROCEDURE — 250N000011 HC RX IP 250 OP 636: Performed by: EMERGENCY MEDICINE

## 2021-10-06 PROCEDURE — 85025 COMPLETE CBC W/AUTO DIFF WBC: CPT | Performed by: EMERGENCY MEDICINE

## 2021-10-06 PROCEDURE — 93005 ELECTROCARDIOGRAM TRACING: CPT

## 2021-10-06 PROCEDURE — 96361 HYDRATE IV INFUSION ADD-ON: CPT

## 2021-10-06 PROCEDURE — 83735 ASSAY OF MAGNESIUM: CPT | Performed by: EMERGENCY MEDICINE

## 2021-10-06 RX ORDER — ONDANSETRON 2 MG/ML
4 INJECTION INTRAMUSCULAR; INTRAVENOUS ONCE
Status: COMPLETED | OUTPATIENT
Start: 2021-10-06 | End: 2021-10-06

## 2021-10-06 RX ORDER — ONDANSETRON 4 MG/1
4 TABLET, ORALLY DISINTEGRATING ORAL ONCE
Status: DISCONTINUED | OUTPATIENT
Start: 2021-10-06 | End: 2021-10-06

## 2021-10-06 RX ORDER — IOPAMIDOL 755 MG/ML
100 INJECTION, SOLUTION INTRAVASCULAR ONCE
Status: COMPLETED | OUTPATIENT
Start: 2021-10-06 | End: 2021-10-06

## 2021-10-06 RX ORDER — ACETAMINOPHEN 325 MG/1
975 TABLET ORAL ONCE
Status: COMPLETED | OUTPATIENT
Start: 2021-10-06 | End: 2021-10-06

## 2021-10-06 RX ADMIN — ONDANSETRON 4 MG: 2 INJECTION INTRAMUSCULAR; INTRAVENOUS at 21:42

## 2021-10-06 RX ADMIN — IOPAMIDOL 100 ML: 755 INJECTION, SOLUTION INTRAVENOUS at 21:49

## 2021-10-06 RX ADMIN — SODIUM CHLORIDE 1000 ML: 9 INJECTION, SOLUTION INTRAVENOUS at 23:22

## 2021-10-06 RX ADMIN — ACETAMINOPHEN 975 MG: 325 TABLET ORAL at 23:22

## 2021-10-06 RX ADMIN — ONDANSETRON 4 MG: 2 INJECTION INTRAMUSCULAR; INTRAVENOUS at 23:23

## 2021-10-06 ASSESSMENT — ENCOUNTER SYMPTOMS
NAUSEA: 1
SHORTNESS OF BREATH: 0
CHILLS: 1
DIAPHORESIS: 1
HEADACHES: 0
DIARRHEA: 0
ABDOMINAL PAIN: 1
VOMITING: 1
LIGHT-HEADEDNESS: 0

## 2021-10-06 ASSESSMENT — MIFFLIN-ST. JEOR: SCORE: 2032.67

## 2021-10-06 NOTE — PATIENT INSTRUCTIONS
Sent to ER for evaluation of tachycardia. DDx includes dehydration secondary to viral gastritis, electrolyte abnormality, COVID, viral syndrome, cardiac arrhythmia, among others.
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
## 2021-10-06 NOTE — ED TRIAGE NOTES
Coming from walk in clinic by QUANG SETH tachycardia, n/v denies blood in emesis.  Has intracarnial shunt for hydrocephalus.      Yesterday felt feverish.  Last time any of this happened his shunt was clogged.    Parents en route to department  Patient arrived by [facility]

## 2021-10-07 VITALS
RESPIRATION RATE: 13 BRPM | TEMPERATURE: 98.6 F | BODY MASS INDEX: 39.86 KG/M2 | SYSTOLIC BLOOD PRESSURE: 129 MMHG | HEART RATE: 105 BPM | WEIGHT: 248 LBS | OXYGEN SATURATION: 97 % | DIASTOLIC BLOOD PRESSURE: 71 MMHG | HEIGHT: 66 IN

## 2021-10-07 LAB
ATRIAL RATE - MUSE: 124 BPM
DIASTOLIC BLOOD PRESSURE - MUSE: NORMAL MMHG
INTERPRETATION ECG - MUSE: NORMAL
P AXIS - MUSE: 48 DEGREES
PR INTERVAL - MUSE: 148 MS
QRS DURATION - MUSE: 72 MS
QT - MUSE: 318 MS
QTC - MUSE: 456 MS
R AXIS - MUSE: 27 DEGREES
SYSTOLIC BLOOD PRESSURE - MUSE: NORMAL MMHG
T AXIS - MUSE: 40 DEGREES
VENTRICULAR RATE- MUSE: 124 BPM

## 2021-10-07 PROCEDURE — 258N000003 HC RX IP 258 OP 636: Performed by: EMERGENCY MEDICINE

## 2021-10-07 RX ORDER — ONDANSETRON 4 MG/1
4 TABLET, ORALLY DISINTEGRATING ORAL EVERY 8 HOURS PRN
Qty: 10 TABLET | Refills: 0 | Status: SHIPPED | OUTPATIENT
Start: 2021-10-07 | End: 2021-10-10

## 2021-10-07 RX ADMIN — SODIUM CHLORIDE 1000 ML: 9 INJECTION, SOLUTION INTRAVENOUS at 01:21

## 2021-10-07 RX ADMIN — SODIUM CHLORIDE 1000 ML: 9 INJECTION, SOLUTION INTRAVENOUS at 00:25
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
## 2021-10-08 ENCOUNTER — TELEPHONE (OUTPATIENT)
Dept: NEUROSURGERY | Facility: CLINIC | Age: 31
End: 2021-10-08

## 2021-10-08 NOTE — TELEPHONE ENCOUNTER
Attempted to reach out to Pacheco and his PCA numerous times, was unable to leave message on both number listed in pt's chart. Will try again later.  Jane Blas RN, CNRN

## 2021-12-02 ENCOUNTER — OFFICE VISIT (OUTPATIENT)
Dept: NEUROSURGERY | Facility: CLINIC | Age: 31
End: 2021-12-02
Payer: MEDICARE

## 2021-12-02 VITALS
OXYGEN SATURATION: 97 % | HEART RATE: 98 BPM | SYSTOLIC BLOOD PRESSURE: 105 MMHG | DIASTOLIC BLOOD PRESSURE: 64 MMHG | WEIGHT: 248 LBS | HEIGHT: 66 IN | BODY MASS INDEX: 39.86 KG/M2

## 2021-12-02 DIAGNOSIS — Z98.2 S/P VENTRICULAR SHUNT PLACEMENT: ICD-10-CM

## 2021-12-02 DIAGNOSIS — G95.0 SYRINGOMYELIA (H): Primary | ICD-10-CM

## 2021-12-02 DIAGNOSIS — Q07.01 ARNOLD-CHIARI MALFORMATION, TYPE II (H): ICD-10-CM

## 2021-12-02 PROCEDURE — 99204 OFFICE O/P NEW MOD 45 MIN: CPT | Performed by: NURSE PRACTITIONER

## 2021-12-02 ASSESSMENT — MIFFLIN-ST. JEOR: SCORE: 2032.67

## 2021-12-02 NOTE — LETTER
"    12/2/2021         RE: Pacheco David  [address] [address]        Dear Colleague,    Thank you for referring your patient, Pacheco David, to the [facility]. Please see a copy of my visit note below.    Neurosurgery Progress Note  12/2/2021    HPI: Pacheco David is a 31 year old male known to our group shunted hydrocephalus, chiari decompression and syringomyelia and syringobulbia. Patient of Dr Easton. He was recently seen at  ED 10/6/2021 with complaints of nausea, vomiting, tachycardia, abdominal pain. CT head stable. XR shunt evaluation without concern.     Today he denies any of the symptoms of which he visited the ED. No headaches, no vision disturbance, no nausea, no abdominal pain. He does endorse pain when he turns his head or when he touches the side of his neck where his shunt tubing runs. He also states Dr Rojas was concerned with CT thoracic finding's effacement of the thecal sac at T9-T10 with stable calcification/ossification which is stable since 2014 and was hoping to obtain MRI. Will get scans to our system so I can review.    Discussed patient's visit with Dr Easton. No concerns for ED symptoms that have since resolved and/or hypersensitivity to touch near shunt. It has been some time since we had updated images for cervical and thoracic spine. Will order and then have Pacheco follow up in clinic to review results and verify shunt setting did not change with MRI magnet. Discussed with Pacheco on the phone 12/3.     PLAN: Mri c and t spine, will need shunt Follow-up after MRI    Physical Exam  /64   Pulse 98   Ht 1.676 m (5' 6\")   Wt 112.5 kg (248 lb)   SpO2 97%   BMI 40.03 kg/m      General: alert, oriented. ONTIVEROS. Speech is clear.     Motor: normal bulk and tone    Strength: appears to have equal strength     Sensory: intact     Coordination: steady gait     Imaging: ED WW images reviewed. Nothing further to add. Attempting to obtain images " from  for thoracic and cervical spine.     NICKOLAS Carter-CNP  [facility] Neurosurgery  O: [phone]                Again, thank you for allowing me to participate in the care of your patient.        Sincerely,        WILIAN Hernandez CNP

## 2021-12-02 NOTE — PROGRESS NOTES
"Neurosurgery Progress Note  12/2/2021    HPI: Pacheco David is a 31 year old male known to our group shunted hydrocephalus, chiari decompression and syringomyelia and syringobulbia. Patient of Dr Easton. He was recently seen at  ED 10/6/2021 with complaints of nausea, vomiting, tachycardia, abdominal pain. CT head stable. XR shunt evaluation without concern.     Today he denies any of the symptoms of which he visited the ED. No headaches, no vision disturbance, no nausea, no abdominal pain. He does endorse pain when he turns his head or when he touches the side of his neck where his shunt tubing runs. He also states Dr Rojas was concerned with CT thoracic finding's effacement of the thecal sac at T9-T10 with stable calcification/ossification which is stable since 2014 and was hoping to obtain MRI. Will get scans to our system so I can review.    Discussed patient's visit with Dr Easton. No concerns for ED symptoms that have since resolved and/or hypersensitivity to touch near shunt. It has been some time since we had updated images for cervical and thoracic spine. Will order and then have Pacheco follow up in clinic to review results and verify shunt setting did not change with MRI magnet. Discussed with Pacheco on the phone 12/3.     PLAN: Mri c and t spine, will need shunt Follow-up after MRI    Physical Exam  /64   Pulse 98   Ht 1.676 m (5' 6\")   Wt 112.5 kg (248 lb)   SpO2 97%   BMI 40.03 kg/m      General: alert, oriented. ONTIVEROS. Speech is clear.     Motor: normal bulk and tone    Strength: appears to have equal strength     Sensory: intact     Coordination: steady gait     Imaging: ED WW images reviewed. Nothing further to add. Attempting to obtain images from  for thoracic and cervical spine.     Rena Steele, NICKOLAS-CNP  [facility] Neurosurgery  O: [phone]            "

## 2021-12-02 NOTE — PATIENT INSTRUCTIONS
1. MRI cervical and thoracic spine. See us in clinic after MRI to discuss results and to check shunt setting to make sure it is where it should be.

## 2021-12-08 ENCOUNTER — IMMUNIZATION (OUTPATIENT)
Dept: NURSING | Facility: CLINIC | Age: 31
End: 2021-12-08
Payer: MEDICARE

## 2021-12-08 PROCEDURE — 91306 COVID-19,PF,MODERNA (18+ YRS BOOSTER .25ML): CPT

## 2021-12-08 PROCEDURE — 0064A COVID-19,PF,MODERNA (18+ YRS BOOSTER .25ML): CPT

## 2022-01-07 PROBLEM — F32.2 MAJOR DEPRESSIVE DISORDER, SINGLE EPISODE, SEVERE (H): Status: ACTIVE | Noted: 2018-05-30

## 2022-01-07 PROBLEM — Z86.61 H/O MENINGITIS: Status: ACTIVE | Noted: 2017-05-23

## 2022-01-07 PROBLEM — Q07.01 ARNOLD-CHIARI MALFORMATION, TYPE II (H): Status: ACTIVE | Noted: 2018-05-30

## 2022-01-07 PROBLEM — G47.33 OBSTRUCTIVE SLEEP APNEA: Status: ACTIVE | Noted: 2018-05-30

## 2022-01-07 PROBLEM — F06.30 MOOD DISORDER DUE TO MEDICAL CONDITION: Status: ACTIVE | Noted: 2021-06-16

## 2022-01-07 PROBLEM — F33.1 MODERATE EPISODE OF RECURRENT MAJOR DEPRESSIVE DISORDER (H): Status: ACTIVE | Noted: 2021-03-10

## 2022-01-07 PROBLEM — Z79.899 CONTROLLED SUBSTANCE AGREEMENT SIGNED: Status: ACTIVE | Noted: 2018-04-25

## 2022-01-07 PROBLEM — R41.89 COGNITIVE DEFICITS: Status: ACTIVE | Noted: 2020-09-14

## 2022-01-07 PROBLEM — L91.0 KELOID SCAR OF SKIN: Status: ACTIVE | Noted: 2018-05-30

## 2022-01-07 PROBLEM — R13.10 DYSPHAGIA: Status: ACTIVE | Noted: 2018-05-30

## 2022-01-07 PROBLEM — M79.18 MYOFASCIAL MUSCLE PAIN: Status: ACTIVE | Noted: 2021-03-10

## 2022-01-07 PROBLEM — R27.0 ATAXIA: Status: ACTIVE | Noted: 2018-05-30

## 2022-01-07 PROBLEM — M54.9 BACKACHE: Status: ACTIVE | Noted: 2021-03-10

## 2022-01-07 PROBLEM — Z51.81 ENCOUNTER FOR THERAPEUTIC DRUG MONITORING: Status: ACTIVE | Noted: 2021-03-10

## 2022-01-07 PROBLEM — G91.9 HYDROCEPHALUS WITH OPERATING SHUNT (H): Status: ACTIVE | Noted: 2017-05-23

## 2022-01-07 PROBLEM — G89.4 CHRONIC PAIN DISORDER: Status: ACTIVE | Noted: 2021-03-10

## 2022-01-07 PROBLEM — G95.0 SYRINGOMYELIA (H): Status: ACTIVE | Noted: 2018-05-30

## 2022-01-07 PROBLEM — G47.9 SLEEP DISTURBANCES: Status: ACTIVE | Noted: 2021-03-10

## 2022-02-01 ENCOUNTER — HOSPITAL ENCOUNTER (OUTPATIENT)
Dept: MRI IMAGING | Facility: HOSPITAL | Age: 32
End: 2022-02-01
Attending: NURSE PRACTITIONER
Payer: MEDICARE

## 2022-02-01 ENCOUNTER — HOSPITAL ENCOUNTER (OUTPATIENT)
Dept: RADIOLOGY | Facility: HOSPITAL | Age: 32
End: 2022-02-01
Attending: NURSE PRACTITIONER
Payer: MEDICARE

## 2022-02-01 DIAGNOSIS — G95.0 SYRINGOMYELIA (H): ICD-10-CM

## 2022-02-01 DIAGNOSIS — Q07.01 ARNOLD-CHIARI MALFORMATION, TYPE II (H): ICD-10-CM

## 2022-02-01 DIAGNOSIS — Z98.2 S/P VENTRICULAR SHUNT PLACEMENT: ICD-10-CM

## 2022-02-01 PROCEDURE — 255N000002 HC RX 255 OP 636: Performed by: NURSE PRACTITIONER

## 2022-02-01 PROCEDURE — 72157 MRI CHEST SPINE W/O & W/DYE: CPT

## 2022-02-01 PROCEDURE — 72156 MRI NECK SPINE W/O & W/DYE: CPT

## 2022-02-01 PROCEDURE — 999N000065 XR SKULL 1/3 VIEWS

## 2022-02-01 PROCEDURE — A9585 GADOBUTROL INJECTION: HCPCS | Performed by: NURSE PRACTITIONER

## 2022-02-01 PROCEDURE — 70250 X-RAY EXAM OF SKULL: CPT

## 2022-02-01 RX ORDER — GADOBUTROL 604.72 MG/ML
12 INJECTION INTRAVENOUS ONCE
Status: COMPLETED | OUTPATIENT
Start: 2022-02-01 | End: 2022-02-01

## 2022-02-01 RX ADMIN — GADOBUTROL 12 ML: 604.72 INJECTION INTRAVENOUS at 16:26

## 2022-02-02 ENCOUNTER — OFFICE VISIT (OUTPATIENT)
Dept: NEUROSURGERY | Facility: CLINIC | Age: 32
End: 2022-02-02
Payer: MEDICARE

## 2022-02-02 VITALS
DIASTOLIC BLOOD PRESSURE: 88 MMHG | OXYGEN SATURATION: 97 % | HEART RATE: 100 BPM | SYSTOLIC BLOOD PRESSURE: 126 MMHG | WEIGHT: 248 LBS | BODY MASS INDEX: 39.86 KG/M2 | HEIGHT: 66 IN

## 2022-02-02 DIAGNOSIS — G95.0 SYRINGOMYELIA (H): Primary | ICD-10-CM

## 2022-02-02 PROCEDURE — 99214 OFFICE O/P EST MOD 30 MIN: CPT | Performed by: NURSE PRACTITIONER

## 2022-02-02 ASSESSMENT — MIFFLIN-ST. JEOR: SCORE: 2032.67

## 2022-02-02 NOTE — LETTER
"    2/2/2022         RE: Pacheco David  [address] [address]        Dear Colleague,    Thank you for referring your patient, Pacheco David, to the [facility]UROSURGERY [facility]INIC formerly [facility]. Please see a copy of my visit note below.    Neurosurgery Progress Note  02/02/22      A/P:  Pacheco David is a 31 year old male known to our group shunted hydrocephalus, chiari decompression and syringomyelia and syringobulbia. Patient of Dr Easton. He was recently seen at  ED 10/6/2021 with complaints of nausea, vomiting, tachycardia, abdominal pain. CT head stable. XR shunt evaluation without concern- these symptoms resolved. At last clinic visit, there was concern for lower thoracic CT findings with calcification dorsally, he was sent for MRI.     Worsening left leg weakness for the past three months, can no longer feel the leg and gait is ataxic. Imaging reviewed,   Thoracic MRI with extension of the syrinx lower, to L1 - previously stopped at T11-T12. Shunt is set to 0.5    I offered appt with Dr. Amaya for options vs referral back to his original surgeon. Pacheco and his family would like to continue their care with Dr. Easton.     They will call with questions/concerns.     Subjective: Still with tenderness over the shunt site, having trouble walking, getting up, nearly falling. Numbness of the whole left leg which is worsening.     Physical Exam  /88   Pulse 100   Ht 1.676 m (5' 6\")   Wt 112.5 kg (248 lb)   SpO2 97%   BMI 40.03 kg/m      General: alert, oriented. ONTIVEROS. Speech is clear.   Ataxic gait, using cane  Numbness to the left leg  Weakness in left leg     Imaging: MRI cervical and thoracic reviewed  Cervical MRI Increased diameter of syrinx in cervical spine at C2, otherwise similar in size  Thoracic MRI- Thoracic syrinx now extends caudally to involve the conus at L1, previously extended to T11-T12  Thoracic CT 10/26/2021- Patchy thin linear areas of high-attenuation along the " periphery of the spinal canal with more confluent findings at T9-T12 levels.         Edita Trevino CNP  [facility] Neurosurgery  O: [phone]  P: [phone]        Of note: Shunt setting was changed back to 0.5. Confirmed with electronic reader tool.             Again, thank you for allowing me to participate in the care of your patient.        Sincerely,        Edita Trevino NP

## 2022-02-02 NOTE — PROGRESS NOTES
"Neurosurgery Progress Note  02/02/22      A/P:  Pacheco David is a 31 year old male known to our group shunted hydrocephalus, chiari decompression and syringomyelia and syringobulbia. Patient of Dr Easton. He was recently seen at  ED 10/6/2021 with complaints of nausea, vomiting, tachycardia, abdominal pain. CT head stable. XR shunt evaluation without concern- these symptoms resolved. At last clinic visit, there was concern for lower thoracic CT findings with calcification dorsally, he was sent for MRI.     Worsening left leg weakness for the past three months, can no longer feel the leg and gait is ataxic. Imaging reviewed,   Thoracic MRI with extension of the syrinx lower, to L1 - previously stopped at T11-T12. Shunt is set to 0.5    I offered appt with Dr. Amaya for options vs referral back to his original surgeon. Pacheco and his family would like to continue their care with Dr. Easton.     They will call with questions/concerns.     Subjective: Still with tenderness over the shunt site, having trouble walking, getting up, nearly falling. Numbness of the whole left leg which is worsening.     Physical Exam  /88   Pulse 100   Ht 1.676 m (5' 6\")   Wt 112.5 kg (248 lb)   SpO2 97%   BMI 40.03 kg/m      General: alert, oriented. ONTIVEROS. Speech is clear.   Ataxic gait, using cane  Numbness to the left leg  Weakness in left leg     Imaging: MRI cervical and thoracic reviewed  Cervical MRI Increased diameter of syrinx in cervical spine at C2, otherwise similar in size  Thoracic MRI- Thoracic syrinx now extends caudally to involve the conus at L1, previously extended to T11-T12  Thoracic CT 10/26/2021- Patchy thin linear areas of high-attenuation along the periphery of the spinal canal with more confluent findings at T9-T12 levels.         Edita Trevino, CNP  [facility] Neurosurgery  O: [phone]  P: [phone]        Of note: Shunt setting was changed back to 0.5. Confirmed with electronic reader " tool.

## 2022-03-07 ENCOUNTER — OFFICE VISIT (OUTPATIENT)
Dept: FAMILY MEDICINE | Facility: CLINIC | Age: 32
End: 2022-03-07
Payer: MEDICARE

## 2022-03-07 ENCOUNTER — TELEPHONE (OUTPATIENT)
Dept: FAMILY MEDICINE | Facility: CLINIC | Age: 32
End: 2022-03-07

## 2022-03-07 VITALS
TEMPERATURE: 98.8 F | OXYGEN SATURATION: 94 % | SYSTOLIC BLOOD PRESSURE: 120 MMHG | RESPIRATION RATE: 18 BRPM | HEIGHT: 66 IN | HEART RATE: 109 BPM | DIASTOLIC BLOOD PRESSURE: 88 MMHG | WEIGHT: 258 LBS | BODY MASS INDEX: 41.46 KG/M2

## 2022-03-07 DIAGNOSIS — E74.39 GLUCOSE INTOLERANCE: ICD-10-CM

## 2022-03-07 DIAGNOSIS — T85.618D SHUNT MALFUNCTION, SUBSEQUENT ENCOUNTER: ICD-10-CM

## 2022-03-07 DIAGNOSIS — G91.9 HYDROCEPHALUS, UNSPECIFIED TYPE (H): ICD-10-CM

## 2022-03-07 DIAGNOSIS — Z01.818 PREOPERATIVE EXAMINATION: Primary | ICD-10-CM

## 2022-03-07 DIAGNOSIS — R79.9 ABNORMAL FINDING OF BLOOD CHEMISTRY, UNSPECIFIED: ICD-10-CM

## 2022-03-07 LAB
ERYTHROCYTE [DISTWIDTH] IN BLOOD BY AUTOMATED COUNT: 14.2 % (ref 10–15)
HBA1C MFR BLD: 6.2 % (ref 0–5.6)
HCT VFR BLD AUTO: 46.5 % (ref 40–53)
HGB BLD-MCNC: 14.8 G/DL (ref 13.3–17.7)
MCH RBC QN AUTO: 27.3 PG (ref 26.5–33)
MCHC RBC AUTO-ENTMCNC: 31.8 G/DL (ref 31.5–36.5)
MCV RBC AUTO: 86 FL (ref 78–100)
PLATELET # BLD AUTO: 361 10E3/UL (ref 150–450)
RBC # BLD AUTO: 5.43 10E6/UL (ref 4.4–5.9)
SARS-COV-2 RNA RESP QL NAA+PROBE: NEGATIVE
WBC # BLD AUTO: 7.3 10E3/UL (ref 4–11)

## 2022-03-07 PROCEDURE — U0003 INFECTIOUS AGENT DETECTION BY NUCLEIC ACID (DNA OR RNA); SEVERE ACUTE RESPIRATORY SYNDROME CORONAVIRUS 2 (SARS-COV-2) (CORONAVIRUS DISEASE [COVID-19]), AMPLIFIED PROBE TECHNIQUE, MAKING USE OF HIGH THROUGHPUT TECHNOLOGIES AS DESCRIBED BY CMS-2020-01-R: HCPCS | Performed by: FAMILY MEDICINE

## 2022-03-07 PROCEDURE — 99214 OFFICE O/P EST MOD 30 MIN: CPT | Performed by: FAMILY MEDICINE

## 2022-03-07 PROCEDURE — 85027 COMPLETE CBC AUTOMATED: CPT | Performed by: FAMILY MEDICINE

## 2022-03-07 PROCEDURE — 36415 COLL VENOUS BLD VENIPUNCTURE: CPT | Performed by: FAMILY MEDICINE

## 2022-03-07 PROCEDURE — U0005 INFEC AGEN DETEC AMPLI PROBE: HCPCS | Performed by: FAMILY MEDICINE

## 2022-03-07 PROCEDURE — 83036 HEMOGLOBIN GLYCOSYLATED A1C: CPT | Performed by: FAMILY MEDICINE

## 2022-03-07 ASSESSMENT — PATIENT HEALTH QUESTIONNAIRE - PHQ9: SUM OF ALL RESPONSES TO PHQ QUESTIONS 1-9: 16
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
## 2022-03-07 NOTE — TELEPHONE ENCOUNTER
"Reason for Call:  Request for results: Covid result    Name of test or procedure: Covid Test    Date of test of procedure: 3/7/22     Location of the test or procedure: [facility]    OK to leave the result message on voice mail or with a family member? Not Applicable    Phone number Patient can be reached at:  Home number on file [phone] (home)    Additional comments: Phyllis from [facility] called requesting for patient Covid test result for surgery on 3/8/22 @ 9:30 A.M at [facility]. Covid test was collected at [facility] and has been send out with status of \"Stat.\" Writer ask , per  answer, Covid result will probably be in by sometime this evening or tomorrow morning. Please fax Covid result to [phone].     Call taken on 3/7/2022 at 2:35 PM by Nancy Mayer    "

## 2022-12-20 DIAGNOSIS — M79.2 NEUROPATHIC PAIN: ICD-10-CM

## 2022-12-20 DIAGNOSIS — G89.4 CHRONIC PAIN DISORDER: Primary | ICD-10-CM

## 2022-12-20 DIAGNOSIS — Z91.09 OTHER ALLERGY, OTHER THAN TO MEDICINAL AGENTS: Chronic | ICD-10-CM

## 2022-12-20 RX ORDER — LORATADINE 10 MG/1
10 TABLET ORAL DAILY
Qty: 90 TABLET | Refills: 4 | Status: SHIPPED | OUTPATIENT
Start: 2022-12-20 | End: 2023-04-07

## 2022-12-20 RX ORDER — MOMETASONE FUROATE MONOHYDRATE 50 UG/1
2 SPRAY, METERED NASAL DAILY
Qty: 17 G | Refills: 4 | Status: SHIPPED | OUTPATIENT
Start: 2022-12-20 | End: 2023-04-07

## 2022-12-20 RX ORDER — LIDOCAINE 50 MG/G
PATCH TOPICAL EVERY 24 HOURS
Qty: 90 PATCH | Refills: 4 | Status: SHIPPED | OUTPATIENT
Start: 2022-12-20 | End: 2023-04-07

## 2022-12-20 NOTE — TELEPHONE ENCOUNTER
Medication Question or Refill        What medication are you calling about (include dose and sig)?: lidocaine patches 5% menthol  nasal spray (Bottle thrown away) allergies dry sinus lotadine 10mg    Controlled Substance Agreement on file:   CSA -- Patient Level:    CSA: None found at the patient level.       Who prescribed the medication?: Dr. Nicolas    Do you need a refill? Yes: nasal spray completely out patches also out    When did you use the medication last? 12/19/22    Patient offered an appointment? Yes: 2/14/23 at 12pm    Do you have any questions or concerns?  No    [facility]: [facility] - [place], [place] - [address]  [address]  [address]  [place] [address]  Phone: [phone] Fax: [phone]    [facility] #[id number] - [place], [place] - [address]  [address] [address]  Phone: [phone] Fax: [phone]      Okay to leave a detailed message?: Yes at Cell number on file:    Telephone Information:   Mobile [phone]

## 2022-12-20 NOTE — TELEPHONE ENCOUNTER
Spoke to pt he looking for refill on:  mometasone (NASONEX) 50 MCG/ACT nasal spray  lidocaine (LIDODERM) 5 % patch  loratadine (CLARITIN) 10 MG tablet    Please look into refill request and order meds if applicable. If unable to order, please follow back up with pt to advise. Thank you.     Preferred Pharmacy:   [facility] - [place], [place] - [address]  [address]  [address]  [place] [address]  Phone: [phone] Fax: [phone]

## 2022-12-21 NOTE — TELEPHONE ENCOUNTER
Future Appointments   Date Time Provider Department Center   2/14/2023 12:20 PM Lary Nicolas MD [facility]      Health Maintenance Due   Topic Date Due     URINE DRUG SCREEN  Never done     MEDICARE ANNUAL WELLNESS VISIT  07/17/2021     COVID-19 Vaccine (4 - Booster for Moderna series) 02/02/2022     ANNUAL REVIEW OF HM ORDERS  07/19/2022     INFLUENZA VACCINE (1) 09/01/2022     PHQ-9  09/07/2022     BP Readings from Last 3 Encounters:   03/07/22 120/88   02/02/22 126/88   12/02/21 105/64     Pacheco was seen today for refill request.    Diagnoses and all orders for this visit:    Chronic pain disorder  -     lidocaine (LIDODERM) 5 % patch; Place onto the skin every 24 hours To prevent lidocaine toxicity, patient should be patch free for 12 hrs daily.    Neuropathic pain  -     lidocaine (LIDODERM) 5 % patch; Place onto the skin every 24 hours To prevent lidocaine toxicity, patient should be patch free for 12 hrs daily.    Allergies  -     loratadine (CLARITIN) 10 MG tablet; Take 1 tablet (10 mg) by mouth daily  -     mometasone (NASONEX) 50 MCG/ACT nasal spray; Spray 2 sprays in nostril daily

## 2023-04-07 ENCOUNTER — OFFICE VISIT (OUTPATIENT)
Dept: FAMILY MEDICINE | Facility: CLINIC | Age: 33
End: 2023-04-07
Payer: MEDICARE

## 2023-04-07 VITALS
SYSTOLIC BLOOD PRESSURE: 120 MMHG | OXYGEN SATURATION: 100 % | HEART RATE: 97 BPM | BODY MASS INDEX: 44.06 KG/M2 | WEIGHT: 273 LBS | RESPIRATION RATE: 16 BRPM | DIASTOLIC BLOOD PRESSURE: 88 MMHG | TEMPERATURE: 98.2 F

## 2023-04-07 DIAGNOSIS — K52.1 DIARRHEA DUE TO DRUG: ICD-10-CM

## 2023-04-07 DIAGNOSIS — R79.89 HIGH THYROID STIMULATING HORMONE (TSH) LEVEL: ICD-10-CM

## 2023-04-07 DIAGNOSIS — R79.89 ABNORMAL LFTS (LIVER FUNCTION TESTS): ICD-10-CM

## 2023-04-07 DIAGNOSIS — E66.01 MORBID OBESITY (H): ICD-10-CM

## 2023-04-07 DIAGNOSIS — N31.9 NEUROGENIC BLADDER: ICD-10-CM

## 2023-04-07 DIAGNOSIS — D64.9 ANEMIA, UNSPECIFIED TYPE: ICD-10-CM

## 2023-04-07 DIAGNOSIS — Z79.4 TYPE 2 DIABETES MELLITUS WITHOUT COMPLICATION, WITH LONG-TERM CURRENT USE OF INSULIN (H): ICD-10-CM

## 2023-04-07 DIAGNOSIS — Z11.59 ENCOUNTER FOR SCREENING FOR OTHER VIRAL DISEASES: ICD-10-CM

## 2023-04-07 DIAGNOSIS — E78.5 HYPERLIPIDEMIA, UNSPECIFIED HYPERLIPIDEMIA TYPE: ICD-10-CM

## 2023-04-07 DIAGNOSIS — H40.9 GLAUCOMA, UNSPECIFIED GLAUCOMA TYPE, UNSPECIFIED LATERALITY: ICD-10-CM

## 2023-04-07 DIAGNOSIS — E55.9 VITAMIN D DEFICIENCY: ICD-10-CM

## 2023-04-07 DIAGNOSIS — Z91.09 OTHER ALLERGY, OTHER THAN TO MEDICINAL AGENTS: Chronic | ICD-10-CM

## 2023-04-07 DIAGNOSIS — G89.4 CHRONIC PAIN DISORDER: ICD-10-CM

## 2023-04-07 DIAGNOSIS — K59.2 NEUROGENIC BOWEL: ICD-10-CM

## 2023-04-07 DIAGNOSIS — L85.8 KERATOSIS PILARIS: ICD-10-CM

## 2023-04-07 DIAGNOSIS — J30.2 SEASONAL ALLERGIES: ICD-10-CM

## 2023-04-07 DIAGNOSIS — D69.9 BLEEDS EASILY (H): Primary | ICD-10-CM

## 2023-04-07 DIAGNOSIS — M79.2 NEUROPATHIC PAIN: ICD-10-CM

## 2023-04-07 DIAGNOSIS — Z79.899 MEDICATION MANAGEMENT: ICD-10-CM

## 2023-04-07 DIAGNOSIS — Z72.89 OTHER PROBLEMS RELATED TO LIFESTYLE: ICD-10-CM

## 2023-04-07 DIAGNOSIS — F32.2 MAJOR DEPRESSIVE DISORDER, SINGLE EPISODE, SEVERE (H): ICD-10-CM

## 2023-04-07 DIAGNOSIS — E11.9 TYPE 2 DIABETES MELLITUS WITHOUT COMPLICATION, WITH LONG-TERM CURRENT USE OF INSULIN (H): ICD-10-CM

## 2023-04-07 DIAGNOSIS — E11.9 TYPE 2 DIABETES MELLITUS WITHOUT COMPLICATION, WITHOUT LONG-TERM CURRENT USE OF INSULIN (H): ICD-10-CM

## 2023-04-07 LAB
ALBUMIN SERPL BCG-MCNC: 4.4 G/DL (ref 3.5–5.2)
ALP SERPL-CCNC: 94 U/L (ref 40–129)
ALT SERPL W P-5'-P-CCNC: 146 U/L (ref 10–50)
ANION GAP SERPL CALCULATED.3IONS-SCNC: 18 MMOL/L (ref 7–15)
AST SERPL W P-5'-P-CCNC: 118 U/L (ref 10–50)
BILIRUB SERPL-MCNC: 0.4 MG/DL
BUN SERPL-MCNC: 6.6 MG/DL (ref 6–20)
CALCIUM SERPL-MCNC: 9.8 MG/DL (ref 8.6–10)
CANNABINOIDS UR QL SCN: NORMAL
CHLORIDE SERPL-SCNC: 102 MMOL/L (ref 98–107)
CHOLEST SERPL-MCNC: 251 MG/DL
CLOSURE TME COLL+EPINEP BLD: 119 SECONDS
CREAT SERPL-MCNC: 0.7 MG/DL (ref 0.67–1.17)
CREAT UR-MCNC: 108 MG/DL
CREAT UR-MCNC: 108 MG/DL
DEPRECATED HCO3 PLAS-SCNC: 21 MMOL/L (ref 22–29)
ERYTHROCYTE [DISTWIDTH] IN BLOOD BY AUTOMATED COUNT: 13.4 % (ref 10–15)
GFR SERPL CREATININE-BSD FRML MDRD: >90 ML/MIN/1.73M2
GLUCOSE SERPL-MCNC: 122 MG/DL (ref 70–99)
HBA1C MFR BLD: 7.4 % (ref 0–5.6)
HCT VFR BLD AUTO: 44.7 % (ref 40–53)
HDLC SERPL-MCNC: 51 MG/DL
HGB BLD-MCNC: 14.4 G/DL (ref 13.3–17.7)
INR PPP: 0.9 (ref 0.85–1.15)
LDLC SERPL CALC-MCNC: 163 MG/DL
MCH RBC QN AUTO: 27.1 PG (ref 26.5–33)
MCHC RBC AUTO-ENTMCNC: 32.2 G/DL (ref 31.5–36.5)
MCV RBC AUTO: 84 FL (ref 78–100)
MICROALBUMIN UR-MCNC: <12 MG/L
MICROALBUMIN/CREAT UR: NORMAL MG/G{CREAT}
NONHDLC SERPL-MCNC: 200 MG/DL
PLATELET # BLD AUTO: 338 10E3/UL (ref 150–450)
POTASSIUM SERPL-SCNC: 4.1 MMOL/L (ref 3.4–5.3)
PROT SERPL-MCNC: 7.8 G/DL (ref 6.4–8.3)
RBC # BLD AUTO: 5.31 10E6/UL (ref 4.4–5.9)
SODIUM SERPL-SCNC: 141 MMOL/L (ref 136–145)
T4 FREE SERPL-MCNC: 0.97 NG/DL (ref 0.9–1.7)
TRIGL SERPL-MCNC: 185 MG/DL
TSH SERPL DL<=0.005 MIU/L-ACNC: 7.42 UIU/ML (ref 0.3–4.2)
WBC # BLD AUTO: 7.4 10E3/UL (ref 4–11)

## 2023-04-07 PROCEDURE — 99000 SPECIMEN HANDLING OFFICE-LAB: CPT | Performed by: FAMILY MEDICINE

## 2023-04-07 PROCEDURE — 83036 HEMOGLOBIN GLYCOSYLATED A1C: CPT | Performed by: FAMILY MEDICINE

## 2023-04-07 PROCEDURE — G0480 DRUG TEST DEF 1-7 CLASSES: HCPCS | Mod: 59 | Performed by: FAMILY MEDICINE

## 2023-04-07 PROCEDURE — 80175 DRUG SCREEN QUAN LAMOTRIGINE: CPT | Mod: 90 | Performed by: FAMILY MEDICINE

## 2023-04-07 PROCEDURE — 99214 OFFICE O/P EST MOD 30 MIN: CPT | Performed by: FAMILY MEDICINE

## 2023-04-07 PROCEDURE — 85610 PROTHROMBIN TIME: CPT | Performed by: FAMILY MEDICINE

## 2023-04-07 PROCEDURE — 85576 BLOOD PLATELET AGGREGATION: CPT

## 2023-04-07 PROCEDURE — 85027 COMPLETE CBC AUTOMATED: CPT | Performed by: FAMILY MEDICINE

## 2023-04-07 PROCEDURE — 36415 COLL VENOUS BLD VENIPUNCTURE: CPT | Performed by: FAMILY MEDICINE

## 2023-04-07 PROCEDURE — 82306 VITAMIN D 25 HYDROXY: CPT | Performed by: FAMILY MEDICINE

## 2023-04-07 PROCEDURE — 80061 LIPID PANEL: CPT | Performed by: FAMILY MEDICINE

## 2023-04-07 PROCEDURE — 84439 ASSAY OF FREE THYROXINE: CPT | Performed by: FAMILY MEDICINE

## 2023-04-07 PROCEDURE — 82570 ASSAY OF URINE CREATININE: CPT | Mod: 59 | Performed by: FAMILY MEDICINE

## 2023-04-07 PROCEDURE — 80307 DRUG TEST PRSMV CHEM ANLYZR: CPT | Performed by: FAMILY MEDICINE

## 2023-04-07 PROCEDURE — 82043 UR ALBUMIN QUANTITATIVE: CPT | Performed by: FAMILY MEDICINE

## 2023-04-07 PROCEDURE — 84443 ASSAY THYROID STIM HORMONE: CPT | Performed by: FAMILY MEDICINE

## 2023-04-07 PROCEDURE — 80053 COMPREHEN METABOLIC PANEL: CPT | Performed by: FAMILY MEDICINE

## 2023-04-07 RX ORDER — LIDOCAINE 50 MG/G
PATCH TOPICAL EVERY 24 HOURS
Qty: 90 PATCH | Refills: 4 | Status: SHIPPED | OUTPATIENT
Start: 2023-04-07 | End: 2024-04-23

## 2023-04-07 RX ORDER — MOMETASONE FUROATE MONOHYDRATE 50 UG/1
2 SPRAY, METERED NASAL DAILY
Qty: 17 G | Refills: 4 | Status: SHIPPED | OUTPATIENT
Start: 2023-04-07

## 2023-04-07 RX ORDER — LORATADINE 10 MG/1
10 TABLET ORAL DAILY
Qty: 90 TABLET | Refills: 4 | Status: SHIPPED | OUTPATIENT
Start: 2023-04-07 | End: 2024-04-09

## 2023-04-07 RX ORDER — LOTEPREDNOL ETABONATE 5 MG/ML
1 SUSPENSION/ DROPS OPHTHALMIC 4 TIMES DAILY
Qty: 10 ML | Refills: 4 | Status: SHIPPED | OUTPATIENT
Start: 2023-04-07 | End: 2024-03-15

## 2023-04-07 RX ORDER — LOPERAMIDE HYDROCHLORIDE 2 MG/1
TABLET ORAL
Qty: 50 TABLET | Refills: 1 | Status: SHIPPED | OUTPATIENT
Start: 2023-04-07 | End: 2024-03-29

## 2023-04-07 RX ORDER — FLUTICASONE PROPIONATE 50 MCG
2 SPRAY, SUSPENSION (ML) NASAL 2 TIMES DAILY
Qty: 16 G | Refills: 4 | Status: SHIPPED | OUTPATIENT
Start: 2023-04-07 | End: 2023-09-05

## 2023-04-07 RX ORDER — AMMONIUM LACTATE 12 G/100G
CREAM TOPICAL DAILY PRN
Qty: 385 G | Refills: 11 | Status: SHIPPED | OUTPATIENT
Start: 2023-04-07 | End: 2024-03-15

## 2023-04-07 RX ORDER — ROSUVASTATIN CALCIUM 20 MG/1
20 TABLET, COATED ORAL AT BEDTIME
Qty: 90 TABLET | Refills: 4 | Status: SHIPPED | OUTPATIENT
Start: 2023-04-07 | End: 2024-03-25

## 2023-04-07 ASSESSMENT — PATIENT HEALTH QUESTIONNAIRE - PHQ9
SUM OF ALL RESPONSES TO PHQ QUESTIONS 1-9: 23
10. IF YOU CHECKED OFF ANY PROBLEMS, HOW DIFFICULT HAVE THESE PROBLEMS MADE IT FOR YOU TO DO YOUR WORK, TAKE CARE OF THINGS AT HOME, OR GET ALONG WITH OTHER PEOPLE: EXTREMELY DIFFICULT
SUM OF ALL RESPONSES TO PHQ QUESTIONS 1-9: 23

## 2023-04-07 ASSESSMENT — PAIN SCALES - GENERAL: PAINLEVEL: EXTREME PAIN (8)

## 2023-04-07 NOTE — PROGRESS NOTES
Office Visit  [facility]  Date of Service: Apr 7, 2023      Subjective   Pacheco David is a 32 year old male who presents for   Chief Complaint   Patient presents with     Recheck Medication     Depression  Not well-controlled. But receiving care.   Seeing therapist every 2 weeks - Lucas VELAZQUEZ @   Psychiatrist at  - has appt soon.    Bleeding/bruising  Spontaneous bleeding of Gums.  Frequent Nosebleeds.   Spontaneous bruises on arms. Some more than 3 cm.   No bleeding in joints or hematomas.     Diarrhea  Changing pain meds around. Lamotrigine is causing diarrhea.   He has chronic urinary and fecal incontinence due to neurogenic bowel/bladder. But now having spillage and needs larger size incontinence supplies and pads for chair.    Neurosurgery - Dr. Michael Easton @ [facility] and NP Elizabeth Cuellar  Last visit 2/6/23:  Seen for follow up after mri of brain, c and t spine.  Lower extremity weakness worse since last seen.  Left left became numb and gave out yesterday. Fell down the stairs yesterday. Hit head and mid back.  Sore all over today.     Alert and oriented. eom intact. Speech clear. Hearing intact. Moves all extremities. Walking with a cane. Unsteady gait.     Shunt check after mri. Setting at 2.5. reprogrammed to 1.5.      Pain - Neurology - Dr. Rojas @   Last visit 11/8/22:  Progressive left lower extremity numbness/weakness caused by expanding thoraco-lumbar syrinx  Acquired hydrocephalus with  shunt  Chiari malformation (documented as Type 1, could be type 2)  Severe Syringobulbia and syringomyelia  Intractable central mediated pain due to #1-4  Significant physical and cognitive as well as emotional disability due to #1-3  History of depression and suicidal ideation the past    Recommendations  Start low-dose naltrexone  Start lamotrigine 25 mg every other day and slowly increase to 50 mg daily for pain control  Follow-up with neurosurgeon Dr Rodriguez  Jemma regarding syrinx and shunt management  Follow-up with Psychiatry for further medication management  I see him back in 3-6 months or any time sooner if his symptoms change         4/7/2023     8:03 AM   PHQ   PHQ-9 Total Score 23   Q9: Thoughts of better off dead/self-harm past 2 weeks Nearly every day   F/U: Thoughts of suicide or self-harm Yes   F/U: Self harm-plan No   F/U: Self-harm action No   F/U: Safety concerns No            View : No data to display.                    View : No data to display.               Objective   /88 (BP Location: Left arm, Patient Position: Sitting, Cuff Size: Adult Large)   Pulse 97   Temp 98.2  F (36.8  C) (Temporal)   Resp 16   Wt 123.8 kg (273 lb)   SpO2 100%   BMI 44.06 kg/m   He reports that he has never smoked. He has never been exposed to tobacco smoke. He has never used smokeless tobacco.    Gen: Alert, no apparent distress.    Results for orders placed or performed in visit on 04/07/23   Hemoglobin A1c     Status: Abnormal   Result Value Ref Range    Hemoglobin A1C 7.4 (H) 0.0 - 5.6 %   CBC with platelets     Status: Normal   Result Value Ref Range    WBC Count 7.4 4.0 - 11.0 10e3/uL    RBC Count 5.31 4.40 - 5.90 10e6/uL    Hemoglobin 14.4 13.3 - 17.7 g/dL    Hematocrit 44.7 40.0 - 53.0 %    MCV 84 78 - 100 fL    MCH 27.1 26.5 - 33.0 pg    MCHC 32.2 31.5 - 36.5 g/dL    RDW 13.4 10.0 - 15.0 %    Platelet Count 338 150 - 450 10e3/uL   Platelet function closure with reflex     Status: Normal   Result Value Ref Range    PFA-Col/Epi 119 <170 Seconds   Drug Confirmation Panel Urine with Creat - lab collect     Status: None (In process)    Narrative    The following orders were created for panel order Drug Confirmation Panel Urine with Creat - lab collect.  Procedure                               Abnormality         Status                     ---------                               -----------         ------                     Urine Drug Confirmation  ...[[id number]]                      In process                 Urine Creatinine for Mustapha...[[id number]]                      In process                 Cannabinoids qualitative...[[id number]]                      In process                   Please view results for these tests on the individual orders.     Assessment & Plan     1. Easy bleeding.  Spontaneous nose bleeds, bleeding gums, bruising greater than 3 cm.  Normal CBC, normal platelet function, awaiting INR.  2. Neurogenic bladder and bowel.  Now with diarrhea due to medications.  Incontinent of urine and stool.  Now with spillage related to recent diarrhea.  Prescription sent for loperamide 2 pills at onset and 1 pill with each loose bowel movement, up to 8 pills/day.  He will follow-up with his neurologist who is prescribing the lamotrigine that is causing the diarrhea.  Incontinence supplies including pull-ups and tear pads were sent to [facility].  3. New diagnosis of type 2 diabetes.  Previously had prediabetes.  Risk factors include morbid obesity, medications.  Referral made for diabetes education.  Current hemoglobin A1c is 7.4%.  Check urine microalbumin.  4. Severe major depression.  Currently seeing therapist every 2 weeks and has follow-up arranged with psychiatry.  Denies active suicidal ideation.  5. Glaucoma?  Patient is not currently seeing an ophthalmologist, but requests refills of his glaucoma medications.  Referral made to ophthalmology.  Short-term refill given.  6. Medications refilled.  Schedule annual wellness visit.      Order Summary                                                      Bleeds easily (H)  -     INR; Future  -     Platelet function closure with reflex; Future  -     INR  -     Platelet function closure with reflex    Neurogenic bowel  -     Incontinence Supplies Order    Neurogenic bladder  -     Incontinence Supplies Order    Type 2 diabetes mellitus without complication, with long-term current use of insulin (H)  -      Amb Adult Diabetes Educator Referral; Future  -     Albumin Random Urine Quantitative with Creat Ratio; Future  -     Albumin Random Urine Quantitative with Creat Ratio    Diarrhea due to drug  -     loperamide (IMODIUM A-D) 2 MG tablet; 2 pills by mouth as needed with first loose BM of day. 1 pill as needed with each subsequent loose BM. No more than 8 pills per day.    Major Depression Severe Single Episode    Morbid obesity (H)    Glaucoma, unspecified glaucoma type, unspecified laterality  -     loteprednol (LOTEMAX) 0.5 % ophthalmic suspension; Place 1 drop into both eyes 4 times daily    Neuropathic pain  -     lidocaine (LIDODERM) 5 % patch; Place onto the skin every 24 hours To prevent lidocaine toxicity, patient should be patch free for 12 hrs daily.    Chronic pain disorder  -     lidocaine (LIDODERM) 5 % patch; Place onto the skin every 24 hours To prevent lidocaine toxicity, patient should be patch free for 12 hrs daily.    Hyperlipidemia, unspecified hyperlipidemia type  -     TSH with free T4 reflex; Future  -     Lipid panel reflex to direct LDL Fasting; Future  -     TSH with free T4 reflex  -     Lipid panel reflex to direct LDL Fasting  -     rosuvastatin (CRESTOR) 20 MG tablet; Take 1 tablet (20 mg) by mouth At Bedtime    Anemia, unspecified type    Vitamin D deficiency  -     Vitamin D Deficiency; Future  -     Vitamin D Deficiency    Medication management  -     Hemoglobin A1c; Future  -     CBC with platelets; Future  -     Comprehensive metabolic panel; Future  -     Drug Confirmation Panel Urine with Creat - lab collect; Future  -     Hemoglobin A1c  -     CBC with platelets  -     Comprehensive metabolic panel  -     Lamotrigine Level; Future  -     Lamotrigine Level  -     Drug Confirmation Panel Urine with Creat - lab collect    Allergies  -     loratadine (CLARITIN) 10 MG tablet; Take 1 tablet (10 mg) by mouth daily  -     mometasone (NASONEX) 50 MCG/ACT nasal spray; Spray 2 sprays in  nostril daily    Seasonal allergies  -     fluticasone (FLONASE) 50 MCG/ACT nasal spray; Spray 2 sprays into both nostrils 2 times daily    Keratosis pilaris  -     ammonium lactate (AMLACTIN) 12 % external cream; Apply topically daily as needed for dry skin To torso and extremities    Other orders  -     REVIEW OF HEALTH MAINTENANCE PROTOCOL ORDERS            No future appointments.    Completed by: Lary Nicolas M.D., [facility]. 4/7/2023 8:29 AM.  This transcription uses voice recognition software, which may contain typographical errors.  MDM: SDOH neighborhood: [facility] [address], language: English, MDM: diagnosis/treatment limited by social determinants of health including: neighborhood [facility] [address], language English, access to physical activity opportunities and cognitive function, reviewed [facility], reviewed outside records and reviewed results of each unique test.  DME (Durable Medical Equipment) Orders and Documentation  Orders Placed This Encounter   Procedures     Incontinence Supplies Order      The patient was assessed and it was determined the patient is in need of the following listed DME Supplies/Equipment. Please complete supporting documentation below to demonstrate medical necessity.      Incontinence Supplies Documentation  Incontinence supplies are needed due to urinary and fecal incontinence, neurogenic.  Now with spillage..          Answers for HPI/ROS submitted by the patient on 4/7/2023  If you checked off any problems, how difficult have these problems made it for you to do your work, take care of things at home, or get along with other people?: Extremely difficult  PHQ9 TOTAL SCORE: 23  What is the reason for your visit today? : Blood draw lab for another doctor/follow up  How many servings of fruits and vegetables do you eat daily?: 2-3  On average, how many sweetened beverages do you drink each day (Examples: soda, juice, sweet tea, etc.  Do NOT  count diet or artificially sweetened beverages)?: 5  How many minutes a day do you exercise enough to make your heart beat faster?: 9 or less  How many days a week do you exercise enough to make your heart beat faster?: 3 or less  How many days per week do you miss taking your medication?: 0

## 2023-04-08 LAB
DEPRECATED CALCIDIOL+CALCIFEROL SERPL-MC: 8 UG/L (ref 20–75)
LAMOTRIGINE SERPL-MCNC: <0.9 UG/ML

## 2023-04-11 LAB — GABAPENTIN UR QL CFM: PRESENT

## 2023-04-18 ENCOUNTER — TELEPHONE (OUTPATIENT)
Dept: FAMILY MEDICINE | Facility: CLINIC | Age: 33
End: 2023-04-18
Payer: MEDICARE

## 2023-04-18 RX ORDER — ERGOCALCIFEROL 1.25 MG/1
CAPSULE ORAL
Qty: 10 CAPSULE | Refills: 1 | Status: SHIPPED | OUTPATIENT
Start: 2023-04-18 | End: 2024-08-16

## 2023-04-18 NOTE — TELEPHONE ENCOUNTER
----- Message from Lary Nicolas MD sent at 4/18/2023  4:52 PM CDT -----  Please let Pacheco know:  His vitamin D level is low.  He should restart his vitamin D.  His liver tests were abnormal.  When he comes in on 5/3/2023, he should have his labs rechecked.  In the meantime, avoid alcohol.  Cholesterol is too high.  He should work on eating a heart healthy diet.  Once his liver tests are back to normal, I will consider adding a cholesterol medicine.  His thyroid tests are little bit abnormal.  I will recheck those when he comes as well.  The orders are in.    5/3/2023   3:00 PM    Radha Michaud RD         [facility]              [facility]

## 2023-04-19 NOTE — TELEPHONE ENCOUNTER
Called pt in an attempt to relay lab results. Pt does not have a [facility] set up yet.     - will try again another time.    Chris Braxton Cem Say, BSN RN  [facility]

## 2023-04-20 NOTE — TELEPHONE ENCOUNTER
Called pt and relayed lab results. Pt verbalized understanding and will follow recommendation.    - pt would like prescription for vitamin D send to pharmacy.    Chris Truong, BSN RN  [facility]

## 2023-04-20 NOTE — TELEPHONE ENCOUNTER
It was previously sent.  Current Outpatient Medications   Medication Sig Dispense Refill    amitriptyline (ELAVIL) 75 MG tablet Take 75 mg by mouth      ammonium lactate (AMLACTIN) 12 % external cream Apply topically daily as needed for dry skin To torso and extremities 385 g 11    ARIPiprazole (ABILIFY) 5 MG tablet Take 5 mg by mouth daily      DULoxetine (CYMBALTA) 60 MG capsule Take 60 mg by mouth      ergocalciferol (ERGOCALCIFEROL) 1.25 MG (93912 UT) capsule Take 1 capsule (50,000 Units) by mouth once a week for 56 days, THEN 1 capsule (50,000 Units) every 30 days for 360 days. 10 capsule 1    fentaNYL (DURAGESIC) 12 mcg/hr 72 hr patch Place 12 mcg onto the skin every 72 hours      fluticasone (FLONASE) 50 MCG/ACT nasal spray Spray 2 sprays into both nostrils 2 times daily 16 g 4    Gabapentin (NEURONTIN PO) Take 1,000 mg by mouth 3 times daily.      lidocaine (LIDODERM) 5 % patch Place onto the skin every 24 hours To prevent lidocaine toxicity, patient should be patch free for 12 hrs daily. 90 patch 4    loperamide (IMODIUM A-D) 2 MG tablet 2 pills by mouth as needed with first loose BM of day. 1 pill as needed with each subsequent loose BM. No more than 8 pills per day. 50 tablet 1    loratadine (CLARITIN) 10 MG tablet Take 1 tablet (10 mg) by mouth daily 90 tablet 4    loteprednol (LOTEMAX) 0.5 % ophthalmic suspension Place 1 drop into both eyes 4 times daily 10 mL 4    mometasone (NASONEX) 50 MCG/ACT nasal spray Spray 2 sprays in nostril daily 17 g 4    polyethylene glycol (MIRALAX) 17 GM/Dose powder Take 17 g by mouth      QUEtiapine (SEROQUEL) 100 MG tablet Take 100 mg by mouth At Bedtime      rosuvastatin (CRESTOR) 20 MG tablet Take 1 tablet (20 mg) by mouth At Bedtime 90 tablet 4

## 2023-04-23 ENCOUNTER — HEALTH MAINTENANCE LETTER (OUTPATIENT)
Age: 33
End: 2023-04-23

## 2023-05-08 ENCOUNTER — MEDICAL CORRESPONDENCE (OUTPATIENT)
Dept: HEALTH INFORMATION MANAGEMENT | Facility: CLINIC | Age: 33
End: 2023-05-08
Payer: MEDICARE

## 2023-07-16 ENCOUNTER — HEALTH MAINTENANCE LETTER (OUTPATIENT)
Age: 33
End: 2023-07-16

## 2023-09-05 DIAGNOSIS — J30.2 SEASONAL ALLERGIES: ICD-10-CM

## 2023-09-05 RX ORDER — FLUTICASONE PROPIONATE 50 MCG
SPRAY, SUSPENSION (ML) NASAL
Qty: 48 G | Refills: 2 | Status: SHIPPED | OUTPATIENT
Start: 2023-09-05 | End: 2024-03-15

## 2023-12-03 ENCOUNTER — HEALTH MAINTENANCE LETTER (OUTPATIENT)
Age: 33
End: 2023-12-03

## 2024-02-26 ENCOUNTER — MEDICAL CORRESPONDENCE (OUTPATIENT)
Dept: SCHEDULING | Facility: CLINIC | Age: 34
End: 2024-02-26
Payer: MEDICAID

## 2024-02-26 ENCOUNTER — ANCILLARY ORDERS (OUTPATIENT)
Dept: CT IMAGING | Facility: HOSPITAL | Age: 34
End: 2024-02-26

## 2024-02-26 ENCOUNTER — TELEPHONE (OUTPATIENT)
Dept: FAMILY MEDICINE | Facility: CLINIC | Age: 34
End: 2024-02-26
Payer: MEDICAID

## 2024-02-26 DIAGNOSIS — R10.9 ABDOMINAL PAIN, UNSPECIFIED ABDOMINAL LOCATION: Primary | ICD-10-CM

## 2024-02-26 DIAGNOSIS — G91.9 HYDROCEPHALUS, UNSPECIFIED TYPE (H): ICD-10-CM

## 2024-02-26 NOTE — TELEPHONE ENCOUNTER
Pt called back, message was relayed. Pt will either go to urgent care or [facility]. Closing encounter.

## 2024-02-26 NOTE — TELEPHONE ENCOUNTER
RN made multiple attempts to contact patient, but no answer. Left message on patient's voice mail to call clinic back. Unable to leave a message on the other phone numbers.    If patient calls back, please relay message below.    Francisca Baca RN  [facility]    Dr Nicolas  Needs to be seen ASAP today - office, urgent care or ER.

## 2024-02-26 NOTE — TELEPHONE ENCOUNTER
Order/Referral Request    Who is requesting: Pt    Orders being requested: X-ray    Reason service is needed/diagnosis: Pt twisted his left leg and now can't feel it    When are orders needed by: ASAP    Has this been discussed with Provider: No    Does patient have a preference on a Group/Provider/Facility? No    Does patient have an appointment scheduled?: No    Where to send orders: Place orders within Epic    Could we send this information to you in [facility] or would you prefer to receive a phone call?:   Patient would prefer a phone call   Okay to leave a detailed message?: Yes at Home number on file [phone] (home)

## 2024-03-03 ENCOUNTER — APPOINTMENT (OUTPATIENT)
Dept: RADIOLOGY | Facility: HOSPITAL | Age: 34
End: 2024-03-03
Attending: PHYSICIAN ASSISTANT
Payer: MEDICARE

## 2024-03-03 ENCOUNTER — HOSPITAL ENCOUNTER (EMERGENCY)
Facility: HOSPITAL | Age: 34
Discharge: HOME | End: 2024-03-03
Attending: EMERGENCY MEDICINE | Admitting: EMERGENCY MEDICINE
Payer: MEDICARE

## 2024-03-03 VITALS
DIASTOLIC BLOOD PRESSURE: 69 MMHG | BODY MASS INDEX: 43.58 KG/M2 | SYSTOLIC BLOOD PRESSURE: 119 MMHG | OXYGEN SATURATION: 99 % | RESPIRATION RATE: 20 BRPM | HEART RATE: 95 BPM | WEIGHT: 270 LBS | TEMPERATURE: 97.1 F

## 2024-03-03 DIAGNOSIS — G83.14 PARALYSIS OF LEFT LOWER EXTREMITY (H): ICD-10-CM

## 2024-03-03 DIAGNOSIS — S82.842A CLOSED BIMALLEOLAR FRACTURE OF LEFT ANKLE, INITIAL ENCOUNTER: ICD-10-CM

## 2024-03-03 PROCEDURE — 73630 X-RAY EXAM OF FOOT: CPT | Mod: LT

## 2024-03-03 PROCEDURE — 73610 X-RAY EXAM OF ANKLE: CPT | Mod: LT

## 2024-03-03 PROCEDURE — 99283 EMERGENCY DEPT VISIT LOW MDM: CPT

## 2024-03-03 ASSESSMENT — COLUMBIA-SUICIDE SEVERITY RATING SCALE - C-SSRS
2. HAVE YOU ACTUALLY HAD ANY THOUGHTS OF KILLING YOURSELF IN THE PAST MONTH?: NO
6. HAVE YOU EVER DONE ANYTHING, STARTED TO DO ANYTHING, OR PREPARED TO DO ANYTHING TO END YOUR LIFE?: NO
1. IN THE PAST MONTH, HAVE YOU WISHED YOU WERE DEAD OR WISHED YOU COULD GO TO SLEEP AND NOT WAKE UP?: NO

## 2024-03-03 ASSESSMENT — ACTIVITIES OF DAILY LIVING (ADL)
ADLS_ACUITY_SCORE: 33
ADLS_ACUITY_SCORE: 35
ADLS_ACUITY_SCORE: 35
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
## 2024-03-03 NOTE — ED TRIAGE NOTES
Patient arrives by private car for evaluation of left foot numbness since injury 2 months ago.  Reports back in December he rolled his ankle and fell to the ground, since then he reports he has been unable to walk due to numbness.

## 2024-03-04 NOTE — DISCHARGE INSTRUCTIONS
[facility]   [address] [facility], [address]  [place] [address]  Fax: [phone]     Please call and schedule an appointment with orthopedics to discuss your broken ankle.     Please call [facility] to discuss follow up.

## 2024-03-06 ENCOUNTER — HOSPITAL ENCOUNTER (OUTPATIENT)
Dept: CT IMAGING | Facility: HOSPITAL | Age: 34
Discharge: HOME | End: 2024-03-06
Payer: MEDICARE

## 2024-03-06 DIAGNOSIS — R10.9 ABDOMINAL PAIN, UNSPECIFIED ABDOMINAL LOCATION: ICD-10-CM

## 2024-03-06 DIAGNOSIS — G91.9 HYDROCEPHALUS, UNSPECIFIED TYPE (H): ICD-10-CM

## 2024-03-06 LAB
CREAT BLD-MCNC: 0.7 MG/DL (ref 0.7–1.3)
EGFRCR SERPLBLD CKD-EPI 2021: >60 ML/MIN/1.73M2

## 2024-03-06 PROCEDURE — 74177 CT ABD & PELVIS W/CONTRAST: CPT

## 2024-03-06 PROCEDURE — 82565 ASSAY OF CREATININE: CPT

## 2024-03-06 PROCEDURE — 70450 CT HEAD/BRAIN W/O DYE: CPT

## 2024-03-06 PROCEDURE — 250N000011 HC RX IP 250 OP 636

## 2024-03-06 RX ORDER — IOPAMIDOL 755 MG/ML
90 INJECTION, SOLUTION INTRAVASCULAR ONCE
Status: COMPLETED | OUTPATIENT
Start: 2024-03-06 | End: 2024-03-06

## 2024-03-06 RX ADMIN — IOPAMIDOL 90 ML: 755 INJECTION, SOLUTION INTRAVENOUS at 19:17

## 2024-03-11 ENCOUNTER — TRANSFERRED RECORDS (OUTPATIENT)
Dept: HEALTH INFORMATION MANAGEMENT | Facility: CLINIC | Age: 34
End: 2024-03-11
Payer: MEDICAID

## 2024-03-13 ENCOUNTER — HOSPITAL ENCOUNTER (OUTPATIENT)
Facility: CLINIC | Age: 34
End: 2024-03-13
Attending: ORTHOPAEDIC SURGERY | Admitting: ORTHOPAEDIC SURGERY
Payer: MEDICARE

## 2024-03-15 ENCOUNTER — HOSPITAL ENCOUNTER (EMERGENCY)
Facility: HOSPITAL | Age: 34
Discharge: HOME | End: 2024-03-16
Attending: EMERGENCY MEDICINE | Admitting: EMERGENCY MEDICINE
Payer: MEDICARE

## 2024-03-15 DIAGNOSIS — R45.851 SUICIDAL IDEATION: ICD-10-CM

## 2024-03-15 DIAGNOSIS — G89.4 CHRONIC PAIN DISORDER: ICD-10-CM

## 2024-03-15 LAB
ANION GAP SERPL CALCULATED.3IONS-SCNC: 11 MMOL/L (ref 7–15)
BASOPHILS # BLD AUTO: 0 10E3/UL (ref 0–0.2)
BASOPHILS NFR BLD AUTO: 0 %
BUN SERPL-MCNC: 8.9 MG/DL (ref 6–20)
CALCIUM SERPL-MCNC: 9.8 MG/DL (ref 8.6–10)
CHLORIDE SERPL-SCNC: 101 MMOL/L (ref 98–107)
CREAT SERPL-MCNC: 0.66 MG/DL (ref 0.67–1.17)
DEPRECATED HCO3 PLAS-SCNC: 28 MMOL/L (ref 22–29)
EGFRCR SERPLBLD CKD-EPI 2021: >90 ML/MIN/1.73M2
EOSINOPHIL # BLD AUTO: 0.1 10E3/UL (ref 0–0.7)
EOSINOPHIL NFR BLD AUTO: 1 %
ERYTHROCYTE [DISTWIDTH] IN BLOOD BY AUTOMATED COUNT: 13.4 % (ref 10–15)
GLUCOSE SERPL-MCNC: 144 MG/DL (ref 70–99)
HCT VFR BLD AUTO: 47.5 % (ref 40–53)
HGB BLD-MCNC: 15 G/DL (ref 13.3–17.7)
IMM GRANULOCYTES # BLD: 0 10E3/UL
IMM GRANULOCYTES NFR BLD: 0 %
LYMPHOCYTES # BLD AUTO: 1.7 10E3/UL (ref 0.8–5.3)
LYMPHOCYTES NFR BLD AUTO: 20 %
MAGNESIUM SERPL-MCNC: 2 MG/DL (ref 1.7–2.3)
MCH RBC QN AUTO: 26.2 PG (ref 26.5–33)
MCHC RBC AUTO-ENTMCNC: 31.6 G/DL (ref 31.5–36.5)
MCV RBC AUTO: 83 FL (ref 78–100)
MONOCYTES # BLD AUTO: 0.4 10E3/UL (ref 0–1.3)
MONOCYTES NFR BLD AUTO: 5 %
NEUTROPHILS # BLD AUTO: 6.3 10E3/UL (ref 1.6–8.3)
NEUTROPHILS NFR BLD AUTO: 74 %
NRBC # BLD AUTO: 0 10E3/UL
NRBC BLD AUTO-RTO: 0 /100
PLATELET # BLD AUTO: 379 10E3/UL (ref 150–450)
POTASSIUM SERPL-SCNC: 4.7 MMOL/L (ref 3.4–5.3)
RBC # BLD AUTO: 5.73 10E6/UL (ref 4.4–5.9)
SODIUM SERPL-SCNC: 140 MMOL/L (ref 135–145)
T4 FREE SERPL-MCNC: 0.92 NG/DL (ref 0.9–1.7)
TSH SERPL DL<=0.005 MIU/L-ACNC: 4.32 UIU/ML (ref 0.3–4.2)
WBC # BLD AUTO: 8.5 10E3/UL (ref 4–11)

## 2024-03-15 PROCEDURE — 258N000003 HC RX IP 258 OP 636: Performed by: EMERGENCY MEDICINE

## 2024-03-15 PROCEDURE — 36415 COLL VENOUS BLD VENIPUNCTURE: CPT | Performed by: EMERGENCY MEDICINE

## 2024-03-15 PROCEDURE — 85004 AUTOMATED DIFF WBC COUNT: CPT | Performed by: EMERGENCY MEDICINE

## 2024-03-15 PROCEDURE — 84439 ASSAY OF FREE THYROXINE: CPT | Performed by: EMERGENCY MEDICINE

## 2024-03-15 PROCEDURE — 83735 ASSAY OF MAGNESIUM: CPT | Performed by: EMERGENCY MEDICINE

## 2024-03-15 PROCEDURE — 84443 ASSAY THYROID STIM HORMONE: CPT | Performed by: EMERGENCY MEDICINE

## 2024-03-15 PROCEDURE — 80048 BASIC METABOLIC PNL TOTAL CA: CPT | Performed by: EMERGENCY MEDICINE

## 2024-03-15 PROCEDURE — 96374 THER/PROPH/DIAG INJ IV PUSH: CPT

## 2024-03-15 PROCEDURE — 99284 EMERGENCY DEPT VISIT MOD MDM: CPT | Mod: 25

## 2024-03-15 PROCEDURE — 250N000011 HC RX IP 250 OP 636: Performed by: EMERGENCY MEDICINE

## 2024-03-15 PROCEDURE — 250N000013 HC RX MED GY IP 250 OP 250 PS 637: Performed by: EMERGENCY MEDICINE

## 2024-03-15 PROCEDURE — 96361 HYDRATE IV INFUSION ADD-ON: CPT

## 2024-03-15 RX ORDER — GABAPENTIN 600 MG/1
2 TABLET ORAL 2 TIMES DAILY
COMMUNITY

## 2024-03-15 RX ORDER — QUETIAPINE FUMARATE 100 MG/1
100 TABLET, FILM COATED ORAL AT BEDTIME
Status: DISCONTINUED | OUTPATIENT
Start: 2024-03-15 | End: 2024-03-16 | Stop reason: HOSPADM

## 2024-03-15 RX ORDER — OXYCODONE HYDROCHLORIDE 5 MG/1
5 TABLET ORAL ONCE
Status: COMPLETED | OUTPATIENT
Start: 2024-03-15 | End: 2024-03-15

## 2024-03-15 RX ORDER — LORAZEPAM 2 MG/ML
1 INJECTION INTRAMUSCULAR ONCE
Status: COMPLETED | OUTPATIENT
Start: 2024-03-15 | End: 2024-03-15

## 2024-03-15 RX ORDER — TRAZODONE HYDROCHLORIDE 50 MG/1
100 TABLET, FILM COATED ORAL AT BEDTIME
COMMUNITY
Start: 2024-03-13

## 2024-03-15 RX ORDER — QUETIAPINE FUMARATE 100 MG/1
100 TABLET, FILM COATED ORAL 3 TIMES DAILY PRN
Status: DISCONTINUED | OUTPATIENT
Start: 2024-03-15 | End: 2024-03-16 | Stop reason: HOSPADM

## 2024-03-15 RX ORDER — FLUTICASONE PROPIONATE 50 MCG
2 SPRAY, SUSPENSION (ML) NASAL 2 TIMES DAILY
COMMUNITY

## 2024-03-15 RX ORDER — GABAPENTIN 600 MG/1
3 TABLET ORAL AT BEDTIME
COMMUNITY
Start: 2024-01-08 | End: 2024-09-16

## 2024-03-15 RX ORDER — LAMOTRIGINE 25 MG/1
2 TABLET ORAL 2 TIMES DAILY
COMMUNITY
Start: 2024-02-12 | End: 2024-09-06 | Stop reason: DRUGHIGH

## 2024-03-15 RX ORDER — ACETAMINOPHEN 325 MG/1
975 TABLET ORAL ONCE
Status: COMPLETED | OUTPATIENT
Start: 2024-03-15 | End: 2024-03-15

## 2024-03-15 RX ORDER — OXYCODONE HYDROCHLORIDE 5 MG/1
5 TABLET ORAL EVERY 4 HOURS PRN
Status: DISCONTINUED | OUTPATIENT
Start: 2024-03-15 | End: 2024-03-16 | Stop reason: HOSPADM

## 2024-03-15 RX ORDER — HYDROXYZINE HYDROCHLORIDE 25 MG/1
25 TABLET, FILM COATED ORAL EVERY 4 HOURS PRN
Status: DISCONTINUED | OUTPATIENT
Start: 2024-03-15 | End: 2024-03-16 | Stop reason: HOSPADM

## 2024-03-15 RX ADMIN — LORAZEPAM 1 MG: 2 INJECTION INTRAMUSCULAR; INTRAVENOUS at 13:35

## 2024-03-15 RX ADMIN — SODIUM CHLORIDE 1000 ML: 9 INJECTION, SOLUTION INTRAVENOUS at 13:36

## 2024-03-15 RX ADMIN — OXYCODONE HYDROCHLORIDE 5 MG: 5 TABLET ORAL at 18:34

## 2024-03-15 RX ADMIN — HYDROXYZINE HYDROCHLORIDE 25 MG: 25 TABLET, FILM COATED ORAL at 22:48

## 2024-03-15 RX ADMIN — ACETAMINOPHEN 975 MG: 325 TABLET ORAL at 18:34

## 2024-03-15 RX ADMIN — QUETIAPINE FUMARATE 100 MG: 100 TABLET ORAL at 22:47

## 2024-03-15 RX ADMIN — OXYCODONE HYDROCHLORIDE 5 MG: 5 TABLET ORAL at 21:46

## 2024-03-15 ASSESSMENT — ACTIVITIES OF DAILY LIVING (ADL)
ADLS_ACUITY_SCORE: 35

## 2024-03-15 ASSESSMENT — COLUMBIA-SUICIDE SEVERITY RATING SCALE - C-SSRS
6. HAVE YOU EVER DONE ANYTHING, STARTED TO DO ANYTHING, OR PREPARED TO DO ANYTHING TO END YOUR LIFE?: YES
2. HAVE YOU ACTUALLY HAD ANY THOUGHTS OF KILLING YOURSELF IN THE PAST MONTH?: YES
5. HAVE YOU STARTED TO WORK OUT OR WORKED OUT THE DETAILS OF HOW TO KILL YOURSELF? DO YOU INTEND TO CARRY OUT THIS PLAN?: YES
3. HAVE YOU BEEN THINKING ABOUT HOW YOU MIGHT KILL YOURSELF?: YES
4. HAVE YOU HAD THESE THOUGHTS AND HAD SOME INTENTION OF ACTING ON THEM?: NO
1. IN THE PAST MONTH, HAVE YOU WISHED YOU WERE DEAD OR WISHED YOU COULD GO TO SLEEP AND NOT WAKE UP?: YES

## 2024-03-15 NOTE — ED NOTES
"Pt sitting on stretcher, calm and appropriate with staff and primary nurse. Pt admits to suicidal ideation for the last two days. Pt states his plan is to \"stop eating and just sleep.\"  Pt reports he takes medications to help with his neuropathy, states meds have not been helping. Pt placed on 1:1 continuous observation for SI.   "

## 2024-03-15 NOTE — PLAN OF CARE
"Pacheco David  March 15, 2024  Plan of Care Hand-off Note     Patient Care Path: observation    Plan for Care:   It is the recommendation of this clinician that pt stay on observation status. Pt displays the following risk factors: He has suicidal ideation and reports methods of suicide he has considered include starving himself, destroying his shunt, or cutting himself/wrists.  He reports the only reason he has SI is due to his physical pain related to medical problems. Pt denies immediate intent of suicide however states that if his pain is not managed, he will either die by suicide or \"damage\" himself to where his pain is numbed.  He has historical diagnoses of MDD, recurrent.  His psychiatry appointment was on 3/13.  His psychiatrist increased Trazodone for sleep and added Cymbalta for mood and pain. His therapy appointment was on 3/14. Pt states that he requires the use of a walker for mobility. He reports a history of falling/losing his balance due to his medical problems and he broke his ankle in December.  Pt estimates that he is getting 20 minutes total of sleep per day, due to pain.  He also reports losing vision at times due to his medical problems.  He denies HI and psychosis. His most recent SIB was 4 months ago when he punched the walls and himself due to being frustrated about his pain. He reports that in 2011, he was about to jump off a bridge or another elevated place.  He left his home and his parents found him in the community, before this occurred.  At this time, observation is recommended with a mental health reassessment tomorrow. Pt developed a safety plan during the DEC assessment, however he is not sure if he can follow it due to the physical pain.    Identified Goals and Safety Issues: Goal is for patient to decrease level of pain and be safe discharging to the community.  Pt's safety issues include hx of falling/losing balance (broke ankle in December), hx of vision changes due to medical " problems, requires a walker, SI with a plan, pt does not feel safe at home unless pain is decreased, hx of SIB by punching walls/self    Overview:  Olivia David, mother [phone]  (requires [facility] )      Legal Status:  voluntary    Psychiatry Consult: yes       Updated  provider and RN regarding plan of care.           [address]
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
## 2024-03-15 NOTE — ED PROVIDER NOTES
[facility] EMERGENCY DEPARTMENT   ED Mental Health Observation - Initiation Note    Pacheco David was placed into observation at 6:30 PM on 3/15/2024 for Mental health crisis.   Patient is expected to be under observation status for a minimum of eight hours.    I spoke with Henna, from DEC at 6:15 PM. Patient is unable go into in patient psych due to walker use.  Suicidal due to pain, has multiple plans including cutting wrists.  I spoke with the patient at 6:25 PM, he comfortable being observed in the ED and with the pain plan.  The plan is for extended care team evaluation tomorrow.  He is voluntary but holdable.    MD Hank Patel Jonathan C, MD  03/15/24 0688

## 2024-03-15 NOTE — CONSULTS
"Diagnostic Evaluation Consultation  Crisis Assessment    Patient Name: Pacheco David  Age:  33 year old  Legal Sex: male  Gender Identity: male  Pronouns:   Race:   Ethnicity: Not  or   Language: English      Patient was assessed: Virtual: [employer] Crisis Assessment Start Time: 1626 Crisis Assessment Stop Time: 1722  Patient location: [facility] EMERGENCY DEPARTMENT                             [id number]    Referral Data and Chief Complaint  Pacheco David presents to the ED with family/friends (Pt's brother in-law provided transportation to the ED. Pt was alone at the ED during the DEC assessment.). Patient is presenting to the ED for the following concerns: Suicidal ideation, Health stressors, Depression.   Factors that make the mental health crisis life threatening or complex are:  Pt presents to the ED for pain management for medical problems. He states he has suicidal ideation and has considered methods of suicide including starving himself, damaging his shunt, and cutting himself/wrists.  He said he has considered damaging his shunt by \"cut off air circulation to put pressure on shunt until it can't take pressure anymore and it would break and not function anymore.\" He denies suicide intent however states that if his pain is not decreased, he has \"two choices, damage myself enough to numb the pain where I don't feel the pain or to suicide to stop the pain.\" He states  \"What happens if the hospital doesn't help the pain and I get sent home to deal with the pain?\" He stated \"the pain that I'm having could further destroy myself. If they don't do anything like the last two hospitals, and if they don't find anything, then I am dragging myself to hell.\"  Pt stated \"the only reason I came here was because I couldn't tolerate my pain. It's because of my pain I have depression and suicidal thoughts. The pain is making me act upon those thoughts. I was thinking if they can help me " "cope with my pain, to bring it back from a 10 to at least a 5 or 4.  Sending me home would not be productive\".  Pt also said \"when my body doesn't function, I could fall down and break a bone,\" and states he broke his ankle in December, due to his medical condition. He is also afraid that when his vision gets worse, he will lose his balance and fall again. Pt reports having these health stressors for over 12 years. Pt states he requires a walker for mobility.      Informed Consent and Assessment Methods  Explained the crisis assessment process, including applicable information disclosures and limits to confidentiality, assessed understanding of the process, and obtained consent to proceed with the assessment.  Assessment methods included conducting a formal interview with patient, review of medical records, collaboration with medical staff, and obtaining relevant collateral information from family and community providers when available.  : done     Patient response to interventions: acceptance expressed, verbalizes understanding  Coping skills were attempted to reduce the crisis:  Pt reports that coping skills include talking with his mother and older sister, playing video games, reading picture books and comics, and watching anime cartoons.     History of the Crisis   Pt had an appointment with his psychiatrist on 3/13/24 and there was a medication change-increase in Trazodone for sleep and added Cymbalta for mood and pain.  Pt had an appointment with his mental health therapist on 3/14/24.  He has a diagnosis of MDD, recurrent, moderate.  He reports a history of one aborted suicide attempt in 2011. He wanted to jump off a bridge or another elevated place and he left home to do this. His parents drove to find him and brought him home.  Pt reports having one inpatient hospitalization in 2011 or 2012 \"because pain was getting too much for me and because my suicidal thoughts and depression.\" He was hospitalized for 4 " days and discharged with outpatient support including a psychiatrist and psychologist.  Pt resides with his mother, father, younger sister, brother, and 3 nephews. He shared that he has a niece on the way. Pt reports a history of SIB-last time was last year when feeling a lot of pain, and anger related to pain. He stated that he was punching walls until knuckles were bleeding with both hands and he also punched himself.    Brief Psychosocial History  Family:  Single, Children no  Support System:  Sibling(s), Parent(s)  Employment Status:  unemployed  Source of Income:  unable to assess  Financial Environmental Concerns:  none  Current Hobbies:  music, television/movies/videos, reading, other (see comments)  Barriers in Personal Life:  mobility limitations, medical conditions/precautions    Significant Clinical History  Current Anxiety Symptoms:  anxious  Current Depression/Trauma:  hopelessness, helplessness, thoughts of death/suicide  Current Somatic Symptoms:  anxious  Current Psychosis/Thought Disturbance:     Current Eating Symptoms:     Chemical Use History:  Alcohol: None  Benzodiazepines: None  Opiates: None  Cocaine: None  Marijuana: None  Other Use: None   Past diagnosis:  Depression  Family history:  No known history of mental health or chemical health concerns  Past treatment:  Individual therapy, Inpatient Hospitalization, Psychiatric Medication Management, Primary Care  Details of most recent treatment:  Pt is currently working with a psychiatrist and therapist.  Other relevant history:          Collateral Information  Is there collateral information: No (DEC  called pt's mother, Olivia David with a [place] .  Left a voicemail, requesting a return call.)      Risk Assessment  Arthur Suicide Severity Rating Scale Full Clinical Version:  Suicidal Ideation  Q6 Suicide Behavior (Lifetime): yes          Arthur Suicide Severity Rating Scale Recent:  3/15/24  Suicidal Ideation (Recent)  Q1  "Wished to be Dead (Past Month): yes  Q2 Suicidal Thoughts (Past Month): yes  Q3 Suicidal Thought Method: yes (First plan is to starve self, second plan is to destroy shunt-cut off air circulation to put pressure on shunt until it can't take pressure anymore and it would break and not function anymore. Cutting self, slit wrists)  Q4 Suicidal Intent without Specific Plan: no  Q5 Suicide Intent with Specific Plan: no  Within the Past 3 Months?: no  Level of Risk per Screen: moderate risk  Intensity of Ideation (Recent)  Most Severe Ideation Rating (Past 1 Month): 4  Frequency (Past 1 Month): 2-5 times in week  Duration (Past 1 Month): 1-4 hours/a lot of time  Controllability (Past 1 Month): Can control thoughts with a lot of difficulty (\"Takes at least 5 hours to control the thoughts\")  Deterrents (Past 1 Month): Deterrents probably stopped you (family and [employer]ational)  Reasons for Ideation (Past 1 Month): Completely to end or stop the pain (You couldn't go on living with the pain or how you were feeling)  Suicidal Behavior (Recent)  Actual Attempt (Past 3 Months): No  Has subject engaged in non-suicidal self-injurious behavior? (Past 3 Months): No  Interrupted Attempts (Past 3 Months): No  Aborted or Self-Interrupted Attempt (Past 3 Months): Yes (In December stopped self. When he was trying to get outside he started thinking and he went back inside. Was going to plan something outside but decided not to do it.)  Total Number of Aborted or Self-Interrupted Attempts (Past 3 Months): 1  Preparatory Acts or Behavior (Past 3 Months): No    Environmental or Psychosocial Events: helplessness/hopelessness, worsening chronic illness  Protective Factors: Protective Factors: strong bond to family unit, community support, or employment, lives in a responsibly safe and stable environment, good treatment engagement, supportive ongoing medical and mental health care relationships, help seeking, cultural, spiritual , or Scientologist " beliefs associated with meaning and value in life, constructive use of leisure time, enjoyable activities, resilience    Does the patient have thoughts of harming others? Feels Like Hurting Others: no  Previous Attempt to Hurt Others: no  Is the patient engaging in sexually inappropriate behavior?: no    Is the patient engaging in sexually inappropriate behavior?  no        Mental Status Exam   Affect: Appropriate  Appearance: Appropriate  Attention Span/Concentration: Attentive  Eye Contact: Engaged    Fund of Knowledge: Appropriate   Language /Speech Content: Fluent  Language /Speech Volume: Normal  Language /Speech Rate/Productions: Normal  Recent Memory: Intact  Remote Memory: Intact  Mood: Depressed, Anxious  Orientation to Person: Yes   Orientation to Place: Yes  Orientation to Time of Day: Yes  Orientation to Date: Yes     Situation (Do they understand why they are here?): Yes  Psychomotor Behavior: Normal  Thought Content: Suicidal  Thought Form: Goal Directed        Medication  Psychotropic medications:   Medication Orders - Psychiatric (From admission, onward)      Start     Dose/Rate Route Frequency Ordered Stop    03/15/24 2230  QUEtiapine (SEROquel) tablet 100 mg         100 mg Oral AT BEDTIME 03/15/24 2216      03/15/24 2215  QUEtiapine (SEROquel) tablet 100 mg         100 mg Oral 3 TIMES DAILY PRN 03/15/24 2216      03/15/24 2214  hydrOXYzine HCl (ATARAX) tablet 25 mg         25 mg Oral EVERY 4 HOURS PRN 03/15/24 2216               Current Facility-Administered Medications   Medication    hydrOXYzine HCl (ATARAX) tablet 25 mg    oxyCODONE (ROXICODONE) tablet 5 mg    QUEtiapine (SEROquel) tablet 100 mg    QUEtiapine (SEROquel) tablet 100 mg     Current Outpatient Medications   Medication    DULoxetine (CYMBALTA) 60 MG capsule    ergocalciferol (ERGOCALCIFEROL) 1.25 MG (69257 UT) capsule    fluticasone (FLONASE) 50 MCG/ACT nasal spray    gabapentin (NEURONTIN) 600 MG tablet    gabapentin (NEURONTIN) 600  MG tablet    lamoTRIgine (LAMICTAL) 25 MG tablet    lidocaine (LIDODERM) 5 % patch    loperamide (IMODIUM A-D) 2 MG tablet    loratadine (CLARITIN) 10 MG tablet    mometasone (NASONEX) 50 MCG/ACT nasal spray    QUEtiapine (SEROQUEL) 100 MG tablet    rosuvastatin (CRESTOR) 20 MG tablet    traZODone (DESYREL) 50 MG tablet      Current Care Team  Patient Care Team:  Lary Nicolas MD as PCP - General (Family Medicine)  Provider, Historical as Community Health Worker (Primary Care - CC)  Marcial Rojas MD as Physician (Neurology)  Lary Nicolas MD as Assigned PCP  CAROLINE Dsouza as Therapist (Licensed Mental Health)  Milton Denise MD as Psychiatrist (Psychiatry)    Diagnosis  Patient Active Problem List   Diagnosis Code    Allergies Z91.09    Vitamin D deficiency E55.9    Hyperlipidemia E78.5    Major Depression Severe Single Episode F32.2    Arnold-Chiari malformation, type II (H) Q07.01    Obstructive sleep apnea G47.33    Syringomyelia (H) G95.0    Epilepsy And Recurrent Seizures G40.909    Keloid scar of skin L91.0    Ataxia R27.0    Dysphagia R13.10    Recurrent occipital headache R51.9     Shunt Z98.2    Anemia D64.9    Neuropathic pain M79.2    Neurogenic bladder N31.9    Neurogenic bowel K59.2    Controlled substance agreement signed Z79.899    Angular cheilitis K13.0    Cognitive deficits R41.89    Sleep disturbances G47.9    Myofascial muscle pain M79.18    Mood disorder due to medical condition F06.30    Moderate episode of recurrent major depressive disorder (H) F33.1    Hydrocephalus with operating shunt (H) G91.9    H/O meningitis Z86.61    Encounter for therapeutic drug monitoring Z51.81    Chronic pain disorder G89.4    Backache M54.9    Diabetes mellitus, type 2 (H) E11.9    Morbid obesity (H) E66.01       Primary Problem This Admission  Active Hospital Problems   F33.1 *Moderate episode of recurrent major depressive disorder (H)        Clinical Summary and Substantiation of Recommendations  "  It is the recommendation of this clinician that pt stay on observation status. Pt displays the following risk factors: He has suicidal ideation and reports methods of suicide he has considered include starving himself, destroying his shunt, or cutting himself/wrists.  He reports the only reason he has SI is due to his physical pain related to medical problems. Pt denies immediate intent of suicide however states that if his pain is not managed, he will either die by suicide or \"damage\" himself to where his pain is numbed.  He has historical diagnoses of MDD, recurrent.  His psychiatry appointment was on 3/13 and he had a medication change. He had an increase in trazodone and added Cymbalta. His therapy appointment was on 3/14. Pt states that he requires the use of a walker for mobility. He reports a history of falling/losing his balance due to his medical problems and he broke his ankle in December.  Pt estimates that he is getting 20 minutes total of sleep per day, due to pain.  He also reports losing vision at times due to his medical problems.  He denies HI and psychosis. His most recent SIB was 4 months ago when he punched the walls and himself due to being frustrated about his pain. He reports that in 2011, he was about to jump off a bridge or another elevated place.  He left his home and his parents found him in the community, before this occurred.  At this time, observation is recommended with a mental health reassessment tomorrow. Pt developed a safety plan during the DEC assessment, however he is not sure if he can follow it if his pain is not managed.       Patient coping skills attempted to reduce the crisis:  Pt reports that coping skills include talking with his mother and older sister, playing video games, reading picture books and comics, and watching anime cartoons.    Disposition  Recommended disposition: Observation        Reviewed case and recommendations with attending provider. Attending Name: " Dr. Jon Mckinney       Attending concurs with disposition: yes       Patient and/or validated legal guardian concurs with disposition:   yes       Final disposition:  observation    Legal status on admission: Voluntary/Patient has signed consent for treatment    Assessment Details   Total duration spent with the patient: 56 min     CPT code(s) utilized: 43863 - Psychotherapy for Crisis - 60 (30-74*) min    CAROLINE St, Psychotherapist  DEC - Triage & Transition Services  Callback: [phone]

## 2024-03-15 NOTE — ED TRIAGE NOTES
Nerve pain, numbness and tingling all over body. Pt has feelings of being extremely cold at night.     Also here for mental health evaluation, pt sees psychiatrist and psychologist. Most recent thoughts with intent to harm self 2 days ago. R/t suffering from pain.     Hx of meningitis and brain surgeries - constant pain since this in 2010.

## 2024-03-15 NOTE — ED PROVIDER NOTES
ED Behavioral Health Patient Transition of Care Note    Assuming care from:   Blanca Borrero MD    See behavioral health observation note     1. Suicidal ideation    2. Chronic pain disorder               Jon Mckinney MD  03/15/24 7169

## 2024-03-16 VITALS
HEIGHT: 66 IN | SYSTOLIC BLOOD PRESSURE: 117 MMHG | HEART RATE: 114 BPM | WEIGHT: 275 LBS | OXYGEN SATURATION: 93 % | TEMPERATURE: 98.4 F | DIASTOLIC BLOOD PRESSURE: 62 MMHG | RESPIRATION RATE: 16 BRPM | BODY MASS INDEX: 44.2 KG/M2

## 2024-03-16 PROCEDURE — 250N000013 HC RX MED GY IP 250 OP 250 PS 637: Performed by: EMERGENCY MEDICINE

## 2024-03-16 RX ORDER — OXYCODONE AND ACETAMINOPHEN 5; 325 MG/1; MG/1
1 TABLET ORAL EVERY 6 HOURS PRN
Qty: 12 TABLET | Refills: 0 | Status: SHIPPED | OUTPATIENT
Start: 2024-03-16 | End: 2024-03-19

## 2024-03-16 RX ORDER — IBUPROFEN 600 MG/1
600 TABLET, FILM COATED ORAL EVERY 6 HOURS PRN
Qty: 28 TABLET | Refills: 0 | Status: SHIPPED | OUTPATIENT
Start: 2024-03-16 | End: 2024-04-04

## 2024-03-16 RX ADMIN — OXYCODONE HYDROCHLORIDE 5 MG: 5 TABLET ORAL at 10:45

## 2024-03-16 RX ADMIN — OXYCODONE HYDROCHLORIDE 5 MG: 5 TABLET ORAL at 14:52

## 2024-03-16 RX ADMIN — HYDROXYZINE HYDROCHLORIDE 25 MG: 25 TABLET, FILM COATED ORAL at 14:52

## 2024-03-16 RX ADMIN — OXYCODONE HYDROCHLORIDE 5 MG: 5 TABLET ORAL at 03:56

## 2024-03-16 RX ADMIN — HYDROXYZINE HYDROCHLORIDE 25 MG: 25 TABLET, FILM COATED ORAL at 10:45

## 2024-03-16 RX ADMIN — HYDROXYZINE HYDROCHLORIDE 25 MG: 25 TABLET, FILM COATED ORAL at 03:56

## 2024-03-16 ASSESSMENT — COLUMBIA-SUICIDE SEVERITY RATING SCALE - C-SSRS
TOTAL  NUMBER OF ABORTED OR SELF INTERRUPTED ATTEMPTS SINCE LAST CONTACT: NO
TOTAL  NUMBER OF INTERRUPTED ATTEMPTS SINCE LAST CONTACT: NO
6. HAVE YOU EVER DONE ANYTHING, STARTED TO DO ANYTHING, OR PREPARED TO DO ANYTHING TO END YOUR LIFE?: NO
SUICIDE, SINCE LAST CONTACT: NO
ATTEMPT SINCE LAST CONTACT: NO
2. HAVE YOU ACTUALLY HAD ANY THOUGHTS OF KILLING YOURSELF?: NO
1. SINCE LAST CONTACT, HAVE YOU WISHED YOU WERE DEAD OR WISHED YOU COULD GO TO SLEEP AND NOT WAKE UP?: NO

## 2024-03-16 ASSESSMENT — ACTIVITIES OF DAILY LIVING (ADL)
ADLS_ACUITY_SCORE: 35
ADLS_ACUITY_SCORE: 35
ADLS_ACUITY_SCORE: 40
ADLS_ACUITY_SCORE: 35
ADLS_ACUITY_SCORE: 40
ADLS_ACUITY_SCORE: 35
ADLS_ACUITY_SCORE: 40
ADLS_ACUITY_SCORE: 35
ADLS_ACUITY_SCORE: 35

## 2024-03-16 NOTE — ED PROVIDER NOTES
"[facility] EMERGENCY DEPARTMENT   ED Mental Health Observation - Discharge Note (Complete)    Pacheco David is boarding in the ED after undergoing evaluation for chronic pain and transient suicidal ideation, and now resolved.  Please see the initial H&P for this patient's presentation, workup, and disposition plan.    Interim History:  There were no significant events since the last note    Physical Exam:  /66 (BP Location: Right arm)   Pulse 95   Temp 98.4  F (36.9  C) (Oral)   Resp 16   Ht 1.676 m (5' 6\")   Wt 124.7 kg (275 lb)   SpO2 96%   BMI 44.39 kg/m    No respiratory distress, on room air   Well perfused  Behavior appropriate    Medications provided prior to my care:  Medications   oxyCODONE (ROXICODONE) tablet 5 mg (5 mg Oral $Given 3/16/24 1452)   hydrOXYzine HCl (ATARAX) tablet 25 mg (25 mg Oral $Given 3/16/24 1452)   QUEtiapine (SEROquel) tablet 100 mg (has no administration in time range)   QUEtiapine (SEROquel) tablet 100 mg (100 mg Oral $Given 3/15/24 2247)   sodium chloride 0.9% BOLUS 1,000 mL (0 mLs Intravenous Stopped 3/15/24 1445)   LORazepam (ATIVAN) injection 1 mg (1 mg Intravenous $Given 3/15/24 1335)   oxyCODONE (ROXICODONE) tablet 5 mg (5 mg Oral $Given 3/15/24 1834)   acetaminophen (TYLENOL) tablet 975 mg (975 mg Oral $Given 3/15/24 1834)       Laboratory (reviewed and interpreted):  Labs Ordered and Resulted from Time of ED Arrival to Time of ED Departure   BASIC METABOLIC PANEL - Abnormal       Result Value    Sodium 140      Potassium 4.7      Chloride 101      Carbon Dioxide (CO2) 28      Anion Gap 11      Urea Nitrogen 8.9      Creatinine 0.66 (*)     GFR Estimate >90      Calcium 9.8      Glucose 144 (*)    TSH WITH FREE T4 REFLEX - Abnormal    TSH 4.32 (*)    CBC WITH PLATELETS AND DIFFERENTIAL - Abnormal    WBC Count 8.5      RBC Count 5.73      Hemoglobin 15.0      Hematocrit 47.5      MCV 83      MCH 26.2 (*)     MCHC 31.6      RDW 13.4      " Platelet Count 379      % Neutrophils 74      % Lymphocytes 20      % Monocytes 5      % Eosinophils 1      % Basophils 0      % Immature Granulocytes 0      NRBCs per 100 WBC 0      Absolute Neutrophils 6.3      Absolute Lymphocytes 1.7      Absolute Monocytes 0.4      Absolute Eosinophils 0.1      Absolute Basophils 0.0      Absolute Immature Granulocytes 0.0      Absolute NRBCs 0.0     MAGNESIUM - Normal    Magnesium 2.0     T4 FREE - Normal    Free T4 0.92         ED Course:  I discussed case with DEC , ED charge RN  I rounded on the patient    Impression/Plan:  Plan of care discussed at daily meeting; escalation of care considered.   Continue current plan for mental health treatment and placement, please see DEC  note for further details.    Upon reevaluation and discussion with DEC , we believe patient has shown improvement.  Chronic pain is resolved and suicidal ideation is resolved.  Patient able to contract for safety and aftercare instructions given per DEC .  20 minutes.    EMERGENCY DEPARTMENT OBSERVATION status ended at 4:29 PM 3/16/2024    Discharge Management Time: 20 minutes.    1. Suicidal ideation    2. Chronic pain disorder        MD Gaye Toscano Daniel, MD  03/16/24 3082

## 2024-03-16 NOTE — ED NOTES
DEC called and talked to RN, stated if requesting change of disposition, DEC can reassess today, but need another DEC order placed. RN discussed with Dr. Marques. New DEC order placed. DEC states ETA for DEC to call pt today is 1830.

## 2024-03-16 NOTE — ED PROVIDER NOTES
"[facility] EMERGENCY DEPARTMENT   ED Mental Health Observation - Daily Note for 3/16/2024    Pacheco David is a 33 year old male currently boarding in the ED while awaiting placement for Mental health crisis.  Please see the initial H&P for this patient's presentation, workup, and disposition plan.     Hold Status:  Patient is Voluntary, but holdable    Plan:  In brief, the patient's presentation is notable for neck pain, suicidal ideation.  Crisis met with patient recommends hospitalization.   Patient is awaiting Mental health placement    Interim History:  There were significant events since last note.    Patient today reported he is feeling much better today.  Pains much improved.  No longer suicidal.  I asked for crisis team to reassess.  Crisis reassessment will happen at 6:30 PM according to nursing    Physical Exam:  /64   Pulse 89   Temp 97.8  F (36.6  C) (Oral)   Resp 12   Ht 1.676 m (5' 6\")   Wt 124.7 kg (275 lb)   SpO2 98%   BMI 44.39 kg/m    No respiratory distress, on room air   Well perfused  Behavior appropriate    Medications provided prior to my care:  Medications   oxyCODONE (ROXICODONE) tablet 5 mg (5 mg Oral $Given 3/16/24 1045)   hydrOXYzine HCl (ATARAX) tablet 25 mg (25 mg Oral $Given 3/16/24 1045)   QUEtiapine (SEROquel) tablet 100 mg (has no administration in time range)   QUEtiapine (SEROquel) tablet 100 mg (100 mg Oral $Given 3/15/24 2247)   sodium chloride 0.9% BOLUS 1,000 mL (0 mLs Intravenous Stopped 3/15/24 1445)   LORazepam (ATIVAN) injection 1 mg (1 mg Intravenous $Given 3/15/24 1335)   oxyCODONE (ROXICODONE) tablet 5 mg (5 mg Oral $Given 3/15/24 1834)   acetaminophen (TYLENOL) tablet 975 mg (975 mg Oral $Given 3/15/24 1834)       Laboratory (reviewed and interpreted):  Labs Ordered and Resulted from Time of ED Arrival to Time of ED Departure   BASIC METABOLIC PANEL - Abnormal       Result Value    Sodium 140      Potassium 4.7      Chloride 101   "    Carbon Dioxide (CO2) 28      Anion Gap 11      Urea Nitrogen 8.9      Creatinine 0.66 (*)     GFR Estimate >90      Calcium 9.8      Glucose 144 (*)    TSH WITH FREE T4 REFLEX - Abnormal    TSH 4.32 (*)    CBC WITH PLATELETS AND DIFFERENTIAL - Abnormal    WBC Count 8.5      RBC Count 5.73      Hemoglobin 15.0      Hematocrit 47.5      MCV 83      MCH 26.2 (*)     MCHC 31.6      RDW 13.4      Platelet Count 379      % Neutrophils 74      % Lymphocytes 20      % Monocytes 5      % Eosinophils 1      % Basophils 0      % Immature Granulocytes 0      NRBCs per 100 WBC 0      Absolute Neutrophils 6.3      Absolute Lymphocytes 1.7      Absolute Monocytes 0.4      Absolute Eosinophils 0.1      Absolute Basophils 0.0      Absolute Immature Granulocytes 0.0      Absolute NRBCs 0.0     MAGNESIUM - Normal    Magnesium 2.0     T4 FREE - Normal    Free T4 0.92         ED Course:  1330 I met with the patient and discussed plan with care team.   8:48 AM Patient states that he is no longer suicidal and his pain is much better today at a 1/10 severity. Asked nursing to have DEC reassess.    Impression/Plan:  1. Suicidal ideation    2. Chronic pain disorder        MD Shelli Lee Steven J, MD  03/16/24 0217

## 2024-03-16 NOTE — PROGRESS NOTES
Pt has been updated multiple times today regarding plan of care.  Pt has been calm and cooperative. Continue to reassure of plan.

## 2024-03-16 NOTE — ED NOTES
Met with and rounded on this patient. He is lying supine in bed, appears to be resting. Wakes easily to voice. Calm, cooperative. Endorsing SI related to constant chronic neuropathic pain. Denies HI or hallucinations. A&O x 4. Contracts for safety with this author at this time; promises not to harm himself, patients or staff while in ED. States he has no new physical complaints at this time. 1:1 on camera watch.

## 2024-03-16 NOTE — PROGRESS NOTES
"Triage and Transition Services Extended Care Reassessment     Patient: Pacheco goes by \"Pacheco,\" uses he/him pronouns  Date of Service: March 16, 2024  Site of Service: [facility] EMERGENCY DEPARTMENT                             [id number]  Patient was seen yes  Mode of Assessment: In person     Reason for Reassessment: significant behavior change, suicidal ideation, health stressors    History of Patient's Original Emergency Room Encounter: Pt had an appointment with his psychiatrist on 3/13/24 and there was a medication change-increase in Trazodone for sleep and added Cymbalta for mood and pain. Pt had an appointment with his mental health therapist on 3/14/24. He has a dignoses of MDD, recurrent, moderate. He reports a history of one aborted suicide attempt in 2011. He wanted to jump off a bridge or another elevated place and he left home to do this. His parents drove to find him and brought him home. Pt reports having one inpatient hospitalization in 2011 or 2012 \"because pain was getting too much for me and because my suicidal thoughts and depression.\" He was hospitalized for 4 days and discharged with outpatient support including a psychiatrist and psychologist. Pt resides with his mother, father, younger sister, brother, and 3 nephews. He shared that he has a niece on the way. Pt reports a history of SIB-last time was last year when feeling a lot of pain, and anger related to pain. He stated that he was punching walls until knuckles were bleeding with both hands and he also punched himself.    Current Patient Presentation: Pt reports significant increase in mood and decrease in pain. Pt can be observed smiling and laughing during reassessment. Pt states that all of his problems were fixed while in the ED. Pt's initial concern was that his severe pain was causing suicidal ideation. Now that he is no longer in pain with support of medications, pt denies suicidal thoughts. Pt is future focused " in his thinking. He reports that he plans to get home and take a bath. Pt has psychiatry and therapy that he will continue following up with. Pt denies SI, HI, NSSIB, psychosis, substance use.    Presentation Summary: Pt reports significant increase in mood and decrease in pain. Pt can be observed smiling and laughing during reassessment. Pt states that all of his problems were fixed while in the ED. Pt's initial concern was that his severe pain was causing suicidal ideation. Now that he is no longer in pain with support of medications, pt denies suicidal thoughts. Pt is future focused in his thinking. He reports that he plans to get home and take a bath. Pt has psychiatry and therapy that he will continue following up with. Pt denies SI, HI, NSSIB, psychosis, substance use.    Changes Observed Since Initial Assessment: decrease in presenting symptoms, patient/family request    Therapeutic Interventions Provided: Identified and practiced coping skills., Coached on coping techniques/relaxation skills to help improve distress tolerance and managing intense emotions.    Current Symptoms:  (denies)  (denies)  (no longer in pain, denies)        Mental Status Exam   Affect: Appropriate  Appearance: Appropriate  Attention Span/Concentration: Attentive  Eye Contact: Engaged    Fund of Knowledge: Appropriate   Language /Speech Content: Fluent  Language /Speech Volume: Normal  Language /Speech Rate/Productions: Normal  Recent Memory: Intact  Remote Memory: Intact  Mood: Normal  Orientation to Person: Yes   Orientation to Place: Yes  Orientation to Time of Day: Yes  Orientation to Date: Yes     Situation (Do they understand why they are here?): Yes  Psychomotor Behavior: Normal  Thought Content: Clear  Thought Form: Goal Directed    Treatment Objective(s) Addressed: rapport building, safety planning, identifying an appropriate aftercare plan, assessing safety    Patient Response to Interventions: eager to  participate    Progress Towards Goals:  Patient Reports Symptoms Are: stable  Patient Progress Toward Goals: is making progress  Next Step to Work Toward Discharge:  (discharge home)    Case Management: Case Management Included: skills work  Details on skills work: identifying emotions, positive coping skills    C-SSRS Since Last Contact:   1. Wish to be Dead (Since Last Contact): No  2. Non-Specific Active Suicidal Thoughts (Since Last Contact): No     Actual Attempt (Since Last Contact): No  Has subject engaged in non-suicidal self-injurious behavior? (Since Last Contact): No  Interrupted Attempts (Since Last Contact): No  Aborted or Self-Interrupted Attempt (Since Last Contact): No  Preparatory Acts or Behavior (Since Last Contact): No  Suicide (Since Last Contact): No     Calculated C-SSRS Risk Score (Since Last Contact): No Risk Indicated    Plan: Final Disposition / Recommended Care Path: discharge  Plan for Care reviewed with assigned Medical Provider: yes  Plan for Care Team Review: provider, RN  Patient and/or validated legal guardian concurs: yes  Clinical Substantiation: Pt presents to ED for severe pain, which was causing suicidal thoughts. Pt did not attempt to hurt himself. After observation, pt's pain subsided with support of medications. Pt states he feels much better now and no longer endorsing suicidal thoughts. Pt is future focused and looks forward to continue managing his mental health concerns with psychiatrist and therapist. Pt will discharge home with family. Denies SI, HI, NSSIB, psychosis, substance abuse. Pt declines need for further referrals.    Legal Status: Legal Status at Admission: Voluntary/Patient has signed consent for treatment    Session Status: Time session started: 1600  Time session ended: 1630  Session Duration (minutes): 30 minutes  Session Number: 1  Anticipated number of sessions or this episode of care: 1  Date of most recent diagnostic assessment:  (none)    Session  Start Time: 1600  Session Stop Time: 1630  CPT codes: 02458 - Psychotherapy (with patient) - 30 (16-37*) min  Time Spent: 30 minutes      CPT code(s) utilized: 46459 - Psychotherapy (with patient) - 30 (16-37*) min    Diagnosis:   Patient Active Problem List   Diagnosis Code    Allergies Z91.09    Vitamin D deficiency E55.9    Hyperlipidemia E78.5    Major Depression Severe Single Episode F32.2    Arnold-Chiari malformation, type II (H) Q07.01    Obstructive sleep apnea G47.33    Syringomyelia (H) G95.0    Epilepsy And Recurrent Seizures G40.909    Keloid scar of skin L91.0    Ataxia R27.0    Dysphagia R13.10    Recurrent occipital headache R51.9     Shunt Z98.2    Anemia D64.9    Neuropathic pain M79.2    Neurogenic bladder N31.9    Neurogenic bowel K59.2    Controlled substance agreement signed Z79.899    Angular cheilitis K13.0    Cognitive deficits R41.89    Sleep disturbances G47.9    Myofascial muscle pain M79.18    Mood disorder due to medical condition F06.30    Moderate episode of recurrent major depressive disorder (H) F33.1    Hydrocephalus with operating shunt (H) G91.9    H/O meningitis Z86.61    Encounter for therapeutic drug monitoring Z51.81    Chronic pain disorder G89.4    Backache M54.9    Diabetes mellitus, type 2 (H) E11.9    Morbid obesity (H) E66.01       Primary Problem This Admission: Active Hospital Problems    *Moderate episode of recurrent major depressive disorder (H)        [facility] Ko   Licensed Mental Health Professional (LMHP), [facility]  [phone]

## 2024-03-16 NOTE — MEDICATION SCRIBE - ADMISSION MEDICATION HISTORY
Medication Scribe Admission Medication History    Admission medication history is complete. The information provided in this note is only as accurate as the sources available at the time of the update.    Information Source(s): Patient via in-person    Pertinent Information: Patient states that he ran out of Duloxetine a week ago and just got refill today.    Changes made to PTA medication list:  Added: Lamotrigine 25 mg tablet (per patient and fill history)  Deleted: Amitriptyline 75 mg tablet, Ammonium lactate 12% cream, Abilify 5 mg tablet, Fentanyl 12 mcg/72 hr patch, Lotemax 0.5 % eye drop, Miralax(per patient)  Changed: Gabapentin from  1000 mg tid to 1200 bid and 1800 hs (per patient)    Allergies reviewed with patient and updates made in EHR: yes    Medication History Completed By: Naina Nelson 3/15/2024 11:46 PM    PTA Med List   Medication Sig Last Dose    DULoxetine (CYMBALTA) 60 MG capsule Take 60 mg by mouth Past Week at unknown    ergocalciferol (ERGOCALCIFEROL) 1.25 MG (07548 UT) capsule Take 1 capsule (50,000 Units) by mouth once a week for 56 days, THEN 1 capsule (50,000 Units) every 30 days for 360 days. 3/13/2024 at am    fluticasone (FLONASE) 50 MCG/ACT nasal spray Spray 2 sprays into both nostrils 2 times daily 3/15/2024 at pm    gabapentin (NEURONTIN) 600 MG tablet Take 3 tablets by mouth at bedtime Take three tablet at bedtime. 3/14/2024 at hs    gabapentin (NEURONTIN) 600 MG tablet Take 2 tablets by mouth 2 times daily Take two tablet in the morning and evening. 3/15/2024 at pm    lamoTRIgine (LAMICTAL) 25 MG tablet Take 2 tablets by mouth 2 times daily 3/15/2024 at am    lidocaine (LIDODERM) 5 % patch Place onto the skin every 24 hours To prevent lidocaine toxicity, patient should be patch free for 12 hrs daily. 3/15/2024 at am    loperamide (IMODIUM A-D) 2 MG tablet 2 pills by mouth as needed with first loose BM of day. 1 pill as needed with each subsequent loose BM. No more than 8  pills per day. Unknown at prn    loratadine (CLARITIN) 10 MG tablet Take 1 tablet (10 mg) by mouth daily 3/15/2024 at am    mometasone (NASONEX) 50 MCG/ACT nasal spray Spray 2 sprays in nostril daily 3/15/2024 at am    QUEtiapine (SEROQUEL) 100 MG tablet Take 100 mg by mouth At Bedtime 3/14/2024 at hs    rosuvastatin (CRESTOR) 20 MG tablet Take 1 tablet (20 mg) by mouth At Bedtime 3/14/2024 at hs    traZODone (DESYREL) 50 MG tablet Take 100 mg by mouth at bedtime 3/14/2024 at hs

## 2024-03-16 NOTE — DISCHARGE INSTRUCTIONS
Ice or heat off-and-on to sore areas can help with pain.  Ibuprofen as prescribed can help with pain.  1 Percocet if needed for stronger pain every 6 hours.  Do not drive with this.  Follow-up with your family doctor or clinic regarding your chronic pain symptoms.      Step 1: Warning signs:  Warning Signs   Overwhelming pain in my chest and back. Not feeling my legs at all.  Suicidal thoughts, punching self or walls      Step 2: Internal coping strategies - Things I can do to take my mind off my problems without contacting another person:  Strategies   Video games, read picture books and comics, cartoons (anime), deep breathing, listen to music, small stretches, read Bible, dictionary (learn new words)      Step 3: People and social settings that provide distraction:  Name Contact Information   My mom [phone]   [place],        Places   Go to [employer] on Sundays   [employer] groups      Step 4: People whom I can ask for help during a crisis:  Name Contact Information   My oldest sister and mom       Step 5: Professionals or agencies I can contact during a crisis:  Clinician/Agency Name Phone Emergency Contact   Lucas Hernandez, [place], LICSW [phone]    [place] Denise [phone]    Local Emergency Department Emergency Department Address Emergency Department Phone   [facility] [phone]       Suicide Prevention Lifeline Phone: Call or Text 808  Crisis Text Line: Text HOME to 542986     Step 6: Making the environment safer (plan for lethal means safety):  My family can make sure I eat meals. Keep sharps locked up as much as possible.     Optional: What is most important to me and worth living for?:  My family and Congregation     [employer] Safety Plan. Brook Blackmon and Jose Luis Joseph. Used with permission of the authors.

## 2024-03-16 NOTE — ED NOTES
Patient is requesting an update on his plan. States he doesn't think he needs mental health admission, as his SI is due to his pain. States that yesterday his pain was 10/10 but now his pain is much better at 1/10 currently. Will notify the provider.

## 2024-03-16 NOTE — ED PROVIDER NOTES
"[facility] EMERGENCY DEPARTMENT   ED Mental Health Observation - Discharge Note (Complete)    Pacheco David is boarding in the ED after undergoing evaluation for Mental health crisis  Please see the initial H&P for this patient's presentation, workup, and disposition plan.    Interim History:  There were no significant events since the last note    Physical Exam:  /66 (BP Location: Right arm)   Pulse 95   Temp 98.4  F (36.9  C) (Oral)   Resp 16   Ht 1.676 m (5' 6\")   Wt 124.7 kg (275 lb)   SpO2 96%   BMI 44.39 kg/m    No respiratory distress, on room air   Well perfused  Behavior appropriate    Medications provided prior to my care:  Medications   oxyCODONE (ROXICODONE) tablet 5 mg (5 mg Oral $Given 3/16/24 1452)   hydrOXYzine HCl (ATARAX) tablet 25 mg (25 mg Oral $Given 3/16/24 1452)   QUEtiapine (SEROquel) tablet 100 mg (has no administration in time range)   QUEtiapine (SEROquel) tablet 100 mg (100 mg Oral $Given 3/15/24 2247)   sodium chloride 0.9% BOLUS 1,000 mL (0 mLs Intravenous Stopped 3/15/24 1445)   LORazepam (ATIVAN) injection 1 mg (1 mg Intravenous $Given 3/15/24 1335)   oxyCODONE (ROXICODONE) tablet 5 mg (5 mg Oral $Given 3/15/24 1834)   acetaminophen (TYLENOL) tablet 975 mg (975 mg Oral $Given 3/15/24 1834)       Laboratory (reviewed and interpreted):  Labs Ordered and Resulted from Time of ED Arrival to Time of ED Departure   BASIC METABOLIC PANEL - Abnormal       Result Value    Sodium 140      Potassium 4.7      Chloride 101      Carbon Dioxide (CO2) 28      Anion Gap 11      Urea Nitrogen 8.9      Creatinine 0.66 (*)     GFR Estimate >90      Calcium 9.8      Glucose 144 (*)    TSH WITH FREE T4 REFLEX - Abnormal    TSH 4.32 (*)    CBC WITH PLATELETS AND DIFFERENTIAL - Abnormal    WBC Count 8.5      RBC Count 5.73      Hemoglobin 15.0      Hematocrit 47.5      MCV 83      MCH 26.2 (*)     MCHC 31.6      RDW 13.4      Platelet Count 379      % Neutrophils 74      " % Lymphocytes 20      % Monocytes 5      % Eosinophils 1      % Basophils 0      % Immature Granulocytes 0      NRBCs per 100 WBC 0      Absolute Neutrophils 6.3      Absolute Lymphocytes 1.7      Absolute Monocytes 0.4      Absolute Eosinophils 0.1      Absolute Basophils 0.0      Absolute Immature Granulocytes 0.0      Absolute NRBCs 0.0     MAGNESIUM - Normal    Magnesium 2.0     T4 FREE - Normal    Free T4 0.92         ED Course:  I discussed case with DEC , ED charge RN  I rounded on the patient    Impression/Plan:  Plan of care discussed at daily meeting; escalation of care considered.   Continue current plan for mental health treatment and placement, please see DEC  note for further details.    Upon reevaluation and discussion with DEC , we believe patient has shown resolution of suicidal ideation.  Also resolution of his chronic pain syndrome.    EMERGENCY DEPARTMENT OBSERVATION status ended at 5:25 PM 3/16/2024    Discharge Management Time: Discussed patient care with the DEC .  Patient with chronic pain and suicidal ideation resolved.  Patient discharged home in stable condition.  Discharge instructions per DEC .    1. Suicidal ideation    2. Chronic pain disorder        MD Gaye Toscano Daniel, MD  03/16/24 1847

## 2024-03-27 ENCOUNTER — APPOINTMENT (OUTPATIENT)
Dept: LAB | Facility: CLINIC | Age: 34
End: 2024-03-27
Payer: MEDICAID

## 2024-03-27 ENCOUNTER — OFFICE VISIT (OUTPATIENT)
Dept: FAMILY MEDICINE | Facility: CLINIC | Age: 34
End: 2024-03-27
Payer: MEDICAID

## 2024-03-27 VITALS
HEIGHT: 66 IN | RESPIRATION RATE: 16 BRPM | WEIGHT: 275 LBS | DIASTOLIC BLOOD PRESSURE: 87 MMHG | BODY MASS INDEX: 44.2 KG/M2 | HEART RATE: 92 BPM | OXYGEN SATURATION: 98 % | TEMPERATURE: 97.5 F | SYSTOLIC BLOOD PRESSURE: 131 MMHG

## 2024-03-27 DIAGNOSIS — S82.842A ANKLE FRACTURE, BIMALLEOLAR, CLOSED, LEFT, INITIAL ENCOUNTER: ICD-10-CM

## 2024-03-27 DIAGNOSIS — E11.9 TYPE 2 DIABETES MELLITUS WITHOUT COMPLICATION, WITH LONG-TERM CURRENT USE OF INSULIN (H): ICD-10-CM

## 2024-03-27 DIAGNOSIS — Z00.00 MEDICARE ANNUAL WELLNESS VISIT, SUBSEQUENT: ICD-10-CM

## 2024-03-27 DIAGNOSIS — Z01.818 PREOP GENERAL PHYSICAL EXAM: Primary | ICD-10-CM

## 2024-03-27 DIAGNOSIS — Z79.4 TYPE 2 DIABETES MELLITUS WITHOUT COMPLICATION, WITH LONG-TERM CURRENT USE OF INSULIN (H): ICD-10-CM

## 2024-03-27 LAB
ALBUMIN SERPL BCG-MCNC: 4.6 G/DL (ref 3.5–5.2)
ALP SERPL-CCNC: 101 U/L (ref 40–150)
ALT SERPL W P-5'-P-CCNC: 81 U/L (ref 0–70)
ANION GAP SERPL CALCULATED.3IONS-SCNC: 12 MMOL/L (ref 7–15)
AST SERPL W P-5'-P-CCNC: 64 U/L (ref 0–45)
BILIRUB SERPL-MCNC: 0.3 MG/DL
BUN SERPL-MCNC: 13 MG/DL (ref 6–20)
CALCIUM SERPL-MCNC: 9.5 MG/DL (ref 8.6–10)
CHLORIDE SERPL-SCNC: 102 MMOL/L (ref 98–107)
CHOLEST SERPL-MCNC: 169 MG/DL
CREAT SERPL-MCNC: 0.67 MG/DL (ref 0.67–1.17)
DEPRECATED HCO3 PLAS-SCNC: 27 MMOL/L (ref 22–29)
EGFRCR SERPLBLD CKD-EPI 2021: >90 ML/MIN/1.73M2
ERYTHROCYTE [DISTWIDTH] IN BLOOD BY AUTOMATED COUNT: 13.7 % (ref 10–15)
FASTING STATUS PATIENT QL REPORTED: ABNORMAL
GLUCOSE SERPL-MCNC: 149 MG/DL (ref 70–99)
HBA1C MFR BLD: 9.5 % (ref 0–5.6)
HCT VFR BLD AUTO: 44.5 % (ref 40–53)
HDLC SERPL-MCNC: 39 MG/DL
HGB BLD-MCNC: 13.9 G/DL (ref 13.3–17.7)
LDLC SERPL CALC-MCNC: 64 MG/DL
MCH RBC QN AUTO: 25.9 PG (ref 26.5–33)
MCHC RBC AUTO-ENTMCNC: 31.2 G/DL (ref 31.5–36.5)
MCV RBC AUTO: 83 FL (ref 78–100)
NONHDLC SERPL-MCNC: 130 MG/DL
PLATELET # BLD AUTO: 302 10E3/UL (ref 150–450)
POTASSIUM SERPL-SCNC: 3.9 MMOL/L (ref 3.4–5.3)
PROT SERPL-MCNC: 7.7 G/DL (ref 6.4–8.3)
RBC # BLD AUTO: 5.36 10E6/UL (ref 4.4–5.9)
SODIUM SERPL-SCNC: 141 MMOL/L (ref 135–145)
TRIGL SERPL-MCNC: 328 MG/DL
WBC # BLD AUTO: 7.4 10E3/UL (ref 4–11)

## 2024-03-27 PROCEDURE — 82570 ASSAY OF URINE CREATININE: CPT | Performed by: FAMILY MEDICINE

## 2024-03-27 PROCEDURE — 82043 UR ALBUMIN QUANTITATIVE: CPT | Performed by: FAMILY MEDICINE

## 2024-03-27 PROCEDURE — 90686 IIV4 VACC NO PRSV 0.5 ML IM: CPT | Performed by: FAMILY MEDICINE

## 2024-03-27 PROCEDURE — 80053 COMPREHEN METABOLIC PANEL: CPT | Performed by: FAMILY MEDICINE

## 2024-03-27 PROCEDURE — 85027 COMPLETE CBC AUTOMATED: CPT | Performed by: FAMILY MEDICINE

## 2024-03-27 PROCEDURE — 36415 COLL VENOUS BLD VENIPUNCTURE: CPT | Performed by: FAMILY MEDICINE

## 2024-03-27 PROCEDURE — 80061 LIPID PANEL: CPT | Performed by: FAMILY MEDICINE

## 2024-03-27 PROCEDURE — 83036 HEMOGLOBIN GLYCOSYLATED A1C: CPT | Performed by: FAMILY MEDICINE

## 2024-03-27 PROCEDURE — 91320 SARSCV2 VAC 30MCG TRS-SUC IM: CPT | Performed by: FAMILY MEDICINE

## 2024-03-27 PROCEDURE — 90480 ADMN SARSCOV2 VAC 1/ONLY CMP: CPT | Performed by: FAMILY MEDICINE

## 2024-03-27 PROCEDURE — 99207 PR FOOT EXAM NO CHARGE: CPT | Performed by: FAMILY MEDICINE

## 2024-03-27 PROCEDURE — G0008 ADMIN INFLUENZA VIRUS VAC: HCPCS | Performed by: FAMILY MEDICINE

## 2024-03-27 PROCEDURE — 99215 OFFICE O/P EST HI 40 MIN: CPT | Mod: 25 | Performed by: FAMILY MEDICINE

## 2024-03-27 RX ORDER — METFORMIN HCL 500 MG
500 TABLET, EXTENDED RELEASE 24 HR ORAL
Qty: 30 TABLET | Refills: 2 | Status: SHIPPED | OUTPATIENT
Start: 2024-03-27 | End: 2024-03-29

## 2024-03-27 ASSESSMENT — ACTIVITIES OF DAILY LIVING (ADL): CURRENT_FUNCTION: NEEDS ASSISTANCE

## 2024-03-27 ASSESSMENT — PATIENT HEALTH QUESTIONNAIRE - PHQ9
SUM OF ALL RESPONSES TO PHQ QUESTIONS 1-9: 17
10. IF YOU CHECKED OFF ANY PROBLEMS, HOW DIFFICULT HAVE THESE PROBLEMS MADE IT FOR YOU TO DO YOUR WORK, TAKE CARE OF THINGS AT HOME, OR GET ALONG WITH OTHER PEOPLE: VERY DIFFICULT
SUM OF ALL RESPONSES TO PHQ QUESTIONS 1-9: 17
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
## 2024-03-27 NOTE — PROGRESS NOTES
Patricio Bergman is a 33 year old, presenting for the following health issues:  Pre-Op Exam        3/27/2024    10:05 AM   Additional Questions   Roomed by hser   Accompanied by self     HPI       Annual Wellness Visit     Wellness Visit Notes:    -Immunizations: Pt is due for COVID vaccine and due for influenza vaccine. Completed influenza and Covid vaccines today.  -Education: Pt education provided on Mental Health  -Recommended follow-up with PCP to address chronic conditions, mental health, and cognitive concerns. Patient is scheduled to see Dr. Nicolas 6/11/24.    Patient has been advised of split billing requirements and indicates understanding: Yes         Health Care Directive  Patient does not have a Health Care Directive or Living Will: Discussed advance care planning with patient; information given to patient to review.  In general, how would you rate your overall physical health? (!) FAIR   Discussed with patient their rating of physical health; information has been provided.  Neuropathic pain--severe. Followed by neurology, PCP.  Do you have a special diet?  Regular (no restrictions)        3/27/2024   Exercise, Social Connection, Stress   Days per week of moderate/strenous exercise 4 days   Average minutes spent exercising at this level 30 min   Frequency of gathering with friends or relatives Never   Feel stress (tense, anxious, or unable to sleep) Rather much     Do you see a dentist two times every year?  (!) NO  The patient was instructed to see the dentist every 6 months.  Discussed care coordination.  Have you been more tired than usual lately?  (!) YES   Discussed possible causes of fatigue.  Frequent headaches--fatigue after headache resolves. Evaluated by neurology.  If you drink alcohol do you typically have >3 drinks per day or >7 drinks per week? No  Do you have a current opioid prescription? No  Do you use any other controlled substances or medications that are not prescribed by a  provider? None  Social History     Tobacco Use    Smoking status: Never     Passive exposure: Never    Smokeless tobacco: Never   Vaping Use    Vaping Use: Never used   Substance Use Topics    Alcohol use: No    Drug use: No       Needs assistance for the following daily activities: (!) TRANSPORTATION, (!) SHOPPING, (!) PREPARING MEALS, (!) HOUSEWORK, (!) BATHING, (!) LAUNDRY, (!) MONEY MANAGEMENT, (!) TOILETING, and (!) DRESSING has PCA, adequate assistance for now.  Which of the following safety concerns are present in your home?  (!) NO GRAB BARS IN THE BATHROOM   Do you (or your family members) have any concerns about your safety while driving?  No  Do you have any of the following hearing concerns?: No hearing concerns  In the past 6 months, have you been bothered by leaking of urine? (!) YES   Information on urinary incontinence and treatment options given to patient.            3/27/2024   Fall Risk   Fallen 2 or more times in the past year? No   Trouble with walking or balance? Yes   Reason Gait Speed Test Not Completed Patient does not tolerate an upright or standing position (e.g. wheelchair)        Today's PHQ-9 Score:       3/27/2024    10:03 AM   PHQ-9 SCORE   PHQ-9 Total Score MyChart 17 (Moderately severe depression)   PHQ-9 Total Score 17            No data to display                  Reviewed and updated as needed this visit by Provider                        Current providers sharing in care for this patient include:  Patient Care Team:  Lary Nicolas MD as PCP - General (Family Medicine)  Provider, Historical as Community Health Worker (Primary Care - CC)  Marcial Rojas MD as Physician (Neurology)  Lary Nicolas MD as Assigned PCP  CAROLINE Dsouza as Therapist (Licensed Mental Health)  Milton Denise MD as Psychiatrist (Psychiatry)    The following health maintenance items are reviewed in Epic and correct as of today:  Health Maintenance   Topic Date Due    EYE EXAM  Never done     "Pneumococcal Vaccine: Pediatrics (0 to 5 Years) and At-Risk Patients (6 to 64 Years) (2 of 2 - PCV) 01/10/2014    MEDICARE ANNUAL WELLNESS VISIT  07/17/2021    A1C  07/07/2023    INFLUENZA VACCINE (1) 09/01/2023    COVID-19 Vaccine (5 - 2023-24 season) 09/01/2023    LIPID  04/07/2024    MICROALBUMIN  04/07/2024    URINE DRUG SCREEN  04/07/2024    DEPRESSION 12 MO INDEX REPEAT PHQ-9  04/10/2024    PHQ-9  09/27/2024    BMP  03/15/2025    DIABETIC FOOT EXAM  03/27/2025    ANNUAL REVIEW OF HM ORDERS  03/27/2025    ADVANCE CARE PLANNING  07/17/2025    DTAP/TDAP/TD IMMUNIZATION (9 - Td or Tdap) 10/08/2029    HEPATITIS C SCREENING  Completed    HIV SCREENING  Completed    DEPRESSION ACTION PLAN  Completed    IPV IMMUNIZATION  Completed    MENINGITIS IMMUNIZATION  Completed    HEPATITIS B IMMUNIZATION  Completed    HPV IMMUNIZATION  Aged Out    RSV MONOCLONAL ANTIBODY  Aged Out       Appropriate preventive services were discussed with this patient, including applicable screening as appropriate for fall prevention, nutrition, physical activity, Tobacco-use cessation, weight loss and cognition.  Checklist reviewing preventive services available has been given to the patient.           3/27/2024   Mini Cog   Clock Draw Score 0 Abnormal   3 Item Recall 0 objects recalled   Mini Cog Total Score 0                      Objective    /87 (BP Location: Left arm, Patient Position: Sitting, Cuff Size: Adult Regular)   Pulse 92   Temp 97.5  F (36.4  C) (Temporal)   Resp 16   Ht 1.676 m (5' 6\")   Wt 124.7 kg (275 lb)   SpO2 98%   BMI 44.39 kg/m    Body mass index is 44.39 kg/m .  Physical Exam               Signed Electronically by: Kwame Lopez MD    "

## 2024-03-28 LAB
CREAT UR-MCNC: 128 MG/DL
MICROALBUMIN UR-MCNC: 18.6 MG/L
MICROALBUMIN/CREAT UR: 14.53 MG/G CR (ref 0–17)

## 2024-03-29 ENCOUNTER — VIRTUAL VISIT (OUTPATIENT)
Dept: PHARMACY | Facility: CLINIC | Age: 34
End: 2024-03-29
Attending: FAMILY MEDICINE
Payer: MEDICARE

## 2024-03-29 DIAGNOSIS — R52 PAIN: ICD-10-CM

## 2024-03-29 DIAGNOSIS — E11.9 TYPE 2 DIABETES MELLITUS WITHOUT COMPLICATION, WITH LONG-TERM CURRENT USE OF INSULIN (H): Primary | ICD-10-CM

## 2024-03-29 DIAGNOSIS — Z79.4 TYPE 2 DIABETES MELLITUS WITHOUT COMPLICATION, WITH LONG-TERM CURRENT USE OF INSULIN (H): Primary | ICD-10-CM

## 2024-03-29 DIAGNOSIS — F32.2 MAJOR DEPRESSIVE DISORDER, SINGLE EPISODE, SEVERE (H): ICD-10-CM

## 2024-03-29 DIAGNOSIS — E55.9 VITAMIN D DEFICIENCY: ICD-10-CM

## 2024-03-29 DIAGNOSIS — Z91.09 OTHER ALLERGY, OTHER THAN TO MEDICINAL AGENTS: Chronic | ICD-10-CM

## 2024-03-29 PROCEDURE — 99207 PR NO CHARGE LOS: CPT | Mod: 93 | Performed by: PHARMACIST

## 2024-03-29 RX ORDER — OXYCODONE AND ACETAMINOPHEN 5; 325 MG/1; MG/1
1 TABLET ORAL EVERY 12 HOURS PRN
COMMUNITY
Start: 2024-03-26 | End: 2024-04-04

## 2024-03-29 RX ORDER — METFORMIN HCL 500 MG
TABLET, EXTENDED RELEASE 24 HR ORAL
Qty: 120 TABLET | Refills: 3 | Status: SHIPPED | OUTPATIENT
Start: 2024-03-29 | End: 2024-06-14

## 2024-03-29 RX ORDER — BLOOD-GLUCOSE SENSOR
1 EACH MISCELLANEOUS
Qty: 2 EACH | Refills: 11 | Status: SHIPPED | OUTPATIENT
Start: 2024-03-29 | End: 2024-04-22

## 2024-03-29 NOTE — Clinical Note
Thank you for the MTM referral. Will work with patient on medication titration. Increase metformin today with plan to increase him to maximum dose. Also will send him a continuous glucose monitor to check on progress.  I see has also not gotten a vitamin D lab since last year and it was low. Could be work rechecking at next visit.   Please let me know what questions you have for me,  Breezy Pathak, Pharm. D., [facility] Medication Therapy Management Pharmacist Direct Voicemail: [phone]

## 2024-03-29 NOTE — PROGRESS NOTES
Medication Therapy Management (MTM) Encounter    ASSESSMENT:                            Medication Adherence/Access: No issues identified    Diabetes:   Patient is not meeting A1c goal of < 7%. Will target <8% for surgery  Patient would benefit from titrating metformin to maximum dose and starting continuous glucose monitoring.  Due for eye exam      Depression:   Stable.     Pain:  Stable.     Allergies:    Stable.     Supplements:  Last vitamin D lab was over a year ago and was low.  Could benefit from getting another lab drawn    PLAN:                            Work on getting an eye doctor to get your eyes checked yearly  Increase metformin to 1000 mg daily with dinner for 1 week, then increase to 1500 mg daily for 1 week, then 2000 mg daily therafter  Start checking blood glucose with freestyle stuart 3  Once you have your Stuart: Please go to the Menu in the montez then Connected Apps > LibreView > Connect to a Practice > Enter Practice ID > Enter: Pharmacist1    Follow-up: Return in about 4 weeks (around 4/26/2024).    SUBJECTIVE/OBJECTIVE:                          Pacheco David is a 33 year old male called for an initial visit. He was referred to me from Kwame Lopez MD.      Reason for visit: diabetes management for surgery.    Allergies/ADRs: Reviewed in chart  Past Medical History: Reviewed in chart  Tobacco: He reports that he has never smoked. He has never been exposed to tobacco smoke. He has never used smokeless tobacco.  Alcohol: none    Medication Adherence/Access: no issues reported    Diabetes   Metformin 500 mg daily with dinner - for a few days    Reports he needs to get blood glucose down to have surgery on his ankle. Reports that that they want A1c down to below 8%     Patient is not experiencing side effects.  Blood sugar monitoring: never  Current diabetes symptoms: polydipsia, polyphagia, fatigue, and numbness/tingling    Diet/Exercise: Reports that his parents are helping him to reduce carbs.  "They have been giving him \"the appropriate foods to eat\"     Eye exam in the last 12 months? No    Foot exam is up to date  Urine Albumin:   Lab Results   Component Value Date    UMALCR 14.53 03/27/2024      Lab Results   Component Value Date    A1C 9.5 (H) 03/27/2024       Depression:   Duloxetine 60 mg daily - reports he doesn't think it last that long for pain but is helpful for depression  Trazodone 100 mnihgtly  Lamotrigine 50 mg twice a day     States depression is controlled. No concerns or questions at this time.     Pain:  Lidocaine 5% patch - uses two of them every 12 hours (12 hours off)  Gabapentin 1200 mg in the am and afternoon and 1800 mg in the evening. Reports that this is helpful.   Ibuprofen 600 mg as needed - reports he takes this 1 tablet every 6 hours  Oxycodone- acetaminophen 5-325 mg every 6 hours    Reports he has a lot of nerve pain. Reports pain is mostly controlled for now. He also currently has a broken ankle that he is wanting surgery for    Allergies:    Flonase as needed     Nasonex as needed   Loratadine 10 mg daily    Reports allergies are usually with the seasons. Doing okay. For nasal sprays if one doesn't work he will switch to the other.     Supplements:  vitamin D 50,000 units once a week    No concerns or questions at this time.         Today's Vitals: There were no vitals taken for this visit.  ----------------  Post Discharge Medication Reconciliation Status: discharge medications reconciled and changed, per note/orders.    I spent 14 minutes with this patient today. All changes were made via collaborative practice agreement with Lary Nicolas MD. A copy of the visit note was provided to the patient's provider(s).    A summary of these recommendations was sent via [employer].    Breezy Pathak, Pharm. D., BCACP  Medication Therapy Management Pharmacist      Telemedicine Visit Details  Type of service:  Telephone visit  Start Time:  1 pm  End Time:  114 pm     Medication Therapy " Recommendations  Diabetes mellitus, type 2 (H)    Current Medication: metFORMIN (GLUCOPHAGE XR) 500 MG 24 hr tablet   Rationale: Dose too low - Dosage too low - Effectiveness   Recommendation: Increase Dose   Status: Accepted per CPA         Vitamin D deficiency    Current Medication: ergocalciferol (ERGOCALCIFEROL) 1.25 MG (24353 UT) capsule   Rationale: Medication requires monitoring - Needs additional monitoring - Effectiveness   Recommendation: Order Lab   Status: Contact Provider - Awaiting Response

## 2024-03-29 NOTE — PATIENT INSTRUCTIONS
"Recommendations from today's MTM visit:                                                    MTM (medication therapy management) is a service provided by a clinical pharmacist designed to help you get the most of out of your medicines.   Today we reviewed what your medicines are for, how to know if they are working, that your medicines are safe and how to make your medicine regimen as easy as possible.      Work on getting an eye doctor to get your eyes checked yearly  Increase metformin to 1000 mg daily with dinner for 1 week, then increase to 1500 mg daily for 1 week, then 2000 mg daily therafter  Start checking blood glucose with freestyle jp 3  Once you have your Jp: Please go to the Menu in the montez then Connected Apps > LibreView > Connect to a Practice > Enter Practice ID > Enter: Pharmacist1    Follow-up: Return in about 4 weeks (around 4/26/2024).    It was great speaking with you today.  I value your experience and would be very thankful for your time in providing feedback in our clinic survey. In the next few days, you may receive an email or text message from [employer] with a link to a survey related to your  clinical pharmacist.\"     To schedule another MTM appointment, please call the clinic directly or you may call the MTM scheduling line at [phone] or toll-free at [phone].     My Clinical Pharmacist's contact information:                                                      Please feel free to contact me with any questions or concerns you have.      Breezy Pathak, Pharm. D., [facility]  Medication Therapy Management Pharmacist    "

## 2024-04-02 ENCOUNTER — TELEPHONE (OUTPATIENT)
Dept: PHARMACY | Facility: CLINIC | Age: 34
End: 2024-04-02
Payer: MEDICAID

## 2024-04-02 DIAGNOSIS — Z79.4 TYPE 2 DIABETES MELLITUS WITHOUT COMPLICATION, WITH LONG-TERM CURRENT USE OF INSULIN (H): Primary | ICD-10-CM

## 2024-04-02 DIAGNOSIS — E11.9 TYPE 2 DIABETES MELLITUS WITHOUT COMPLICATION, WITH LONG-TERM CURRENT USE OF INSULIN (H): Primary | ICD-10-CM

## 2024-04-02 NOTE — CONFIDENTIAL NOTE
Patient reports that he has been having diarrhea for the last 3 days since stopping the metformin. Reports he had constant diarrhea after having a jalapeno last night. Would be odd if the metformin was causing the diarrhea still. Discussed that once his diarrhea resolves he should start Ozempic 0.25 mg once weekly for 4 weeks. Then increase to 0.5 mg weekly thereafter Let him know to send me a Linq[employer] message if he has coverage issues.     Breezy Pathak, Pharm. D., BCACP  Medication Therapy Management Pharmacist

## 2024-04-04 ENCOUNTER — VIRTUAL VISIT (OUTPATIENT)
Dept: FAMILY MEDICINE | Facility: CLINIC | Age: 34
End: 2024-04-04
Payer: MEDICAID

## 2024-04-04 ENCOUNTER — MYC MEDICAL ADVICE (OUTPATIENT)
Dept: PHARMACY | Facility: CLINIC | Age: 34
End: 2024-04-04

## 2024-04-04 ENCOUNTER — MYC REFILL (OUTPATIENT)
Dept: FAMILY MEDICINE | Facility: CLINIC | Age: 34
End: 2024-04-04

## 2024-04-04 DIAGNOSIS — Z79.4 TYPE 2 DIABETES MELLITUS WITHOUT COMPLICATION, WITH LONG-TERM CURRENT USE OF INSULIN (H): Primary | ICD-10-CM

## 2024-04-04 DIAGNOSIS — E11.9 TYPE 2 DIABETES MELLITUS WITHOUT COMPLICATION, WITH LONG-TERM CURRENT USE OF INSULIN (H): Primary | ICD-10-CM

## 2024-04-04 DIAGNOSIS — S82.842A ANKLE FRACTURE, BIMALLEOLAR, CLOSED, LEFT, INITIAL ENCOUNTER: Primary | ICD-10-CM

## 2024-04-04 DIAGNOSIS — E11.9 TYPE 2 DIABETES MELLITUS WITHOUT COMPLICATION, WITH LONG-TERM CURRENT USE OF INSULIN (H): ICD-10-CM

## 2024-04-04 DIAGNOSIS — Z79.4 TYPE 2 DIABETES MELLITUS WITHOUT COMPLICATION, WITH LONG-TERM CURRENT USE OF INSULIN (H): ICD-10-CM

## 2024-04-04 DIAGNOSIS — S82.892D CLOSED FRACTURE OF LEFT ANKLE WITH ROUTINE HEALING, SUBSEQUENT ENCOUNTER: ICD-10-CM

## 2024-04-04 PROCEDURE — 99213 OFFICE O/P EST LOW 20 MIN: CPT | Mod: 95 | Performed by: STUDENT IN AN ORGANIZED HEALTH CARE EDUCATION/TRAINING PROGRAM

## 2024-04-04 RX ORDER — OXYCODONE AND ACETAMINOPHEN 5; 325 MG/1; MG/1
1 TABLET ORAL EVERY 12 HOURS PRN
Qty: 10 TABLET | Refills: 0 | Status: SHIPPED | OUTPATIENT
Start: 2024-04-04 | End: 2024-09-06

## 2024-04-04 RX ORDER — ACETAMINOPHEN 500 MG
1000 TABLET ORAL EVERY 8 HOURS PRN
Qty: 200 TABLET | Refills: 1 | Status: SHIPPED | OUTPATIENT
Start: 2024-04-04 | End: 2024-06-26

## 2024-04-04 RX ORDER — IBUPROFEN 600 MG/1
600 TABLET, FILM COATED ORAL EVERY 6 HOURS PRN
Qty: 50 TABLET | Refills: 2 | Status: SHIPPED | OUTPATIENT
Start: 2024-04-04 | End: 2024-05-20

## 2024-04-04 NOTE — PROGRESS NOTES
Pacheco is a 33 year old who is being evaluated via a billable video visit.      Pacheco was seen today for diabetes.    Diagnoses and all orders for this visit:    Type 2 diabetes mellitus without complication, with long-term current use of insulin (H)  Comments:  Chronic, uncontrolled. Saw MTM, on metformin and will start ozempic soon. Refer to DM edu.  Patient need to check blood sugars 4 times per day, fasting, 2 hours postprandial as he needs his blood sugars controlled ASAP in order to orthopedic surgery.  Cannot wait for next A1c, considering starting insulin in order to get blood sugars under control as soon as possible.  Orders:  -     Adult Diabetes Education  Referral; Future  - discussed diet  - More information provided on AVS  - RTC in 1 month    Closed fracture of left ankle with routine healing, subsequent encounter  Comments:  Occurred 12/2023. Needs surgery, but has to have better glucose control. Needs A1c <8.          Subjective   Pacheco is a 33 year old, presenting for the following health issues:  Diabetes (Diet plan)        4/4/2024     4:37 PM   Additional Questions   Roomed by Tia     Video Start Time: 4:43 PM    History of Present Illness       Diabetes:   He presents for follow up of diabetes.    He is not checking blood glucose.        He is concerned about other.   He is having numbness in feet, burning in feet, redness, sores, or blisters on feet, excessive thirst, blurry vision and weight gain.  The patient has not had a diabetic eye exam in the last 12 months.          He eats 0-1 servings of fruits and vegetables daily.He consumes 0 sweetened beverage(s) daily.He exercises with enough effort to increase his heart rate 10 to 19 minutes per day.  He exercises with enough effort to increase his heart rate 7 days per week.   He is taking medications regularly.       DM2  Lab Results   Component Value Date    A1C 9.5 03/27/2024    A1C 7.4 04/07/2023    A1C 6.2 03/07/2022    A1C 5.6  07/17/2020     Current management: Metformin, Ozempic 0.25 mg weekly started 4/2/2024 with plan to increase to 0.5mg weekly  Follows with MTM, note reviewed 3/29/24:  Increase metformin to 1000 mg daily with dinner for 1 week, then increase to 1500 mg daily for 1 week, then 2000 mg daily therafter  Start checking blood glucose with freestyle stuart 3   Return in about 4 weeks (around 4/26/2024).  BS at home: Has not picked up FreeStyle.   Needs to  ozempic.   Pt reports he broke his ankle last year and had head injury. XR done, and was going to have surgery in March, but A1c was too high at 9.5. Needs to be at least 8.0 to do surgery.   Pt wants to get DM under control as soon as possible, or he may need to have an ankle fusion. Wants to avoid that. Has at least 6 weeks before it's impossible to repair the ankle.         Objective    Vitals - Patient Reported  Weight (Patient Reported): 127 kg (280 lb)        Physical Exam   GENERAL: alert and no distress  EYES: Eyes grossly normal to inspection.  No discharge or erythema, or obvious scleral/conjunctival abnormalities.  RESP: No audible wheeze, cough, or visible cyanosis.    SKIN: Visible skin clear. No significant rash, abnormal pigmentation or lesions.  NEURO: Cranial nerves grossly intact.  Mentation and speech appropriate for age.  PSYCH: Appropriate affect, tone, and pace of words          Video-Visit Details    Type of service:  Video Visit   Video End Time:5:04 PM  Originating Location (pt. Location): Home    Distant Location (provider location):  On-site  Platform used for Video Visit: Lamont  Signed Electronically by: Nancy Mandujano MD

## 2024-04-04 NOTE — PROGRESS NOTES
DM2  Lab Results   Component Value Date    A1C 9.5 03/27/2024    A1C 7.4 04/07/2023    A1C 6.2 03/07/2022    A1C 5.6 07/17/2020     Current management: Metformin, Ozempic 0.25 mg weekly started 4/2/2024 with plan to increase to 0.5mg weekly  Follows with MTM, note reviewed 3/29/24:  Work on getting an eye doctor to get your eyes checked yearly  Increase metformin to 1000 mg daily with dinner for 1 week, then increase to 1500 mg daily for 1 week, then 2000 mg daily therafter  Start checking blood glucose with freestyle jp 3  Once you have your Jp: Please go to the Menu in the montez then Connected Apps > LibreView > Connect to a Practice > Enter Practice ID > Enter: Pharmacist1     Follow-up: Return in about 4 weeks (around 4/26/2024).  BS at home: ***

## 2024-04-04 NOTE — TELEPHONE ENCOUNTER
Please let Pacheco know he should take tylenol and ibuprofen three times a day as needed and use the oxycodone/acetaminophen if pain isn't controlled with those. Do not exceed 4000 mg of acetaminophen per day, total.    Pacheco was seen today for refill request.    Diagnoses and all orders for this visit:    Ankle fracture, bimalleolar, closed, left, initial encounter  -     oxyCODONE-acetaminophen (PERCOCET) 5-325 MG tablet; Take 1 tablet by mouth every 12 hours as needed for moderate pain (For when pain is not well-controlled after taking ibuprofen + acetaminophen.)  -     ibuprofen (ADVIL/MOTRIN) 600 MG tablet; Take 1 tablet (600 mg) by mouth every 6 hours as needed for moderate pain  -     acetaminophen (TYLENOL) 500 MG tablet; Take 2 tablets (1,000 mg) by mouth every 8 hours as needed for pain

## 2024-04-05 ENCOUNTER — ANCILLARY ORDERS (OUTPATIENT)
Dept: MRI IMAGING | Facility: HOSPITAL | Age: 34
End: 2024-04-05

## 2024-04-05 ENCOUNTER — ANCILLARY ORDERS (OUTPATIENT)
Dept: CT IMAGING | Facility: HOSPITAL | Age: 34
End: 2024-04-05

## 2024-04-05 DIAGNOSIS — G95.9 MYELOPATHY (H): Primary | ICD-10-CM

## 2024-04-05 RX ORDER — BLOOD-GLUCOSE SENSOR
1 EACH MISCELLANEOUS
Qty: 2 EACH | Refills: 11 | OUTPATIENT
Start: 2024-04-05

## 2024-04-05 NOTE — TELEPHONE ENCOUNTER
Medication Question or Refill    Contacts         Type Contact Phone/Fax    04/05/2024 10:53 AM CDT Phone (Incoming) DavidPacheco (Self) [phone] (M)            What medication are you calling about (include dose and sig)?:   ontinuous Blood Gluc Sensor (FREESTYLE ANNA 3 SENSOR) MISC          This was sent [facility] rthey were unable to fill this .Told Patient to go to [facility]. [facility] won't fill it from another Pharmacy needs RX from the clinic  Preferred Pharmacy:   [facility]/pharmacy #[id number] - [facility], [place] - [address]  [address] [address]  Phone: [phone] Fax: [phone]      Controlled Substance Agreement on file:   CSA -- Patient Level:    CSA: None found at the patient level.       Who prescribed the medication?: PCP    Do you need a refill? Yes    When did you use the medication last? No, this RX  [facility]  Patient offered an appointment? No    Do you have any questions or concerns?  Yes:       Could we send this information to you in [facility] or would you prefer to receive a phone call?:   Patient would prefer a phone call   Okay to leave a detailed message?: Yes at Home number on file [phone] (home)

## 2024-04-05 NOTE — TELEPHONE ENCOUNTER
Patient is stating that pharmacy did not receive Rx for his ssensors.  Writer also advised patient to reach out to Rxing provider for refill.      Continuous Blood Gluc Sensor (FREESTYLE ANNA 3 SENSOR) [facility] [[id number]]   Rx'd by Dr Lopez  [facility]/PHARMACY #[id number] - [facility] [address]

## 2024-04-08 ENCOUNTER — VIRTUAL VISIT (OUTPATIENT)
Dept: EDUCATION SERVICES | Facility: CLINIC | Age: 34
End: 2024-04-08
Attending: STUDENT IN AN ORGANIZED HEALTH CARE EDUCATION/TRAINING PROGRAM
Payer: MEDICARE

## 2024-04-08 DIAGNOSIS — Z79.4 TYPE 2 DIABETES MELLITUS WITHOUT COMPLICATION, WITH LONG-TERM CURRENT USE OF INSULIN (H): ICD-10-CM

## 2024-04-08 DIAGNOSIS — E11.9 TYPE 2 DIABETES MELLITUS WITHOUT COMPLICATION, WITH LONG-TERM CURRENT USE OF INSULIN (H): ICD-10-CM

## 2024-04-08 PROCEDURE — G0108 DIAB MANAGE TRN  PER INDIV: HCPCS | Mod: 95 | Performed by: NUTRITIONIST

## 2024-04-08 RX ORDER — BLOOD-GLUCOSE SENSOR
1 EACH MISCELLANEOUS
Qty: 2 EACH | Refills: 5 | Status: SHIPPED | OUTPATIENT
Start: 2024-04-08 | End: 2024-04-16

## 2024-04-08 NOTE — PROGRESS NOTES
Virtual Visit Details    Type of service:  Video Visit   Video Start Time:  9:40am  Video End Time: 10:27am    Originating Location (pt. Location): Home    Distant Location (provider location):  On-site  Platform used for Video Visit: Lamont

## 2024-04-08 NOTE — NURSING NOTE
Is the patient currently in the state of [place]? YES    Visit mode:VIDEO    If the visit is dropped, the patient can be reconnected by: VIDEO VISIT: Text to cell phone:   Telephone Information:   Mobile [phone]   Mobile Not on file.       Will anyone else be joining the visit? NO  (If patient encounters technical issues they should call [phone])    How would you like to obtain your AVS? MyChart    Are changes needed to the allergy or medication list? No    Reason for visit: Follow Up    Rachel MONDRAGON

## 2024-04-09 DIAGNOSIS — Z91.09 OTHER ALLERGY, OTHER THAN TO MEDICINAL AGENTS: Chronic | ICD-10-CM

## 2024-04-09 DIAGNOSIS — E11.9 TYPE 2 DIABETES MELLITUS WITHOUT COMPLICATION, WITH LONG-TERM CURRENT USE OF INSULIN (H): Primary | ICD-10-CM

## 2024-04-09 DIAGNOSIS — Z79.4 TYPE 2 DIABETES MELLITUS WITHOUT COMPLICATION, WITH LONG-TERM CURRENT USE OF INSULIN (H): Primary | ICD-10-CM

## 2024-04-09 RX ORDER — LANCETS
EACH MISCELLANEOUS
Qty: 100 EACH | Refills: 6 | Status: SHIPPED | OUTPATIENT
Start: 2024-04-09 | End: 2024-06-17

## 2024-04-09 RX ORDER — LORATADINE 10 MG/1
TABLET ORAL
Qty: 90 TABLET | Refills: 0 | Status: SHIPPED | OUTPATIENT
Start: 2024-04-09 | End: 2024-07-15

## 2024-04-09 RX ORDER — LANCETS
EACH MISCELLANEOUS
Qty: 200 EACH | Refills: 3 | Status: SHIPPED | OUTPATIENT
Start: 2024-04-09 | End: 2024-05-24

## 2024-04-09 NOTE — TELEPHONE ENCOUNTER
Writer responded via [employer].  JUN MalloyN, RN-BC  [facility]

## 2024-04-09 NOTE — PROGRESS NOTES
Diabetes Self-Management Education & Support    Pacheco David presents today for education related to Type 2 diabetes    Patient is being treated with:  Oral Agents and GLP-1 Agonist  He is accompanied by self    Referring provider:  Nancy Mandujano  Living Situation: with parents  Employment: unemployed    PATIENT CONCERNS/REASON FOR REFERRAL Patient needs to get his glucose under better control for Orthopedic Surgery to his ankle      ASSESSMENT:    Taking Medication:     Current Diabetes Management per Patient:  Taking diabetes medications? yes:     Diabetes Medication(s)       Biguanides       metFORMIN (GLUCOPHAGE XR) 500 MG 24 hr tablet Start taking 1000 mg (2 tablets) by mouth with dinner for 1 week, then increase to 1500 mg (3 tablets) for 1 week and then take 2000 mg (4 tabelts) thereafter       Incretin Mimetic Agents       semaglutide (OZEMPIC) 2 MG/3ML pen Inject 0.25 mg Subcutaneous every 7 days For 4 weeks then increase to 0.5 mg thereafter            Monitoring    No readings to review   Patient is not testing    Patient's most recent   Lab Results   Component Value Date    A1C 9.5 03/27/2024      Patient's A1C goal: <8.0    Activity: no regular exercise program    Healthy Eating:   Patient is eating more vegetables and protein and trying to watch his carb intake.      Problem Solving:      Patient is at risk of hypoglycemia?: NO  Hospitalizations for hyper or hypoglycemia:     Healthy Coping and Stress Management:   Sources of stress identified by patient: My health He is stressed about getting his glucose under control in a timely manner for surgery  Coping mechanisms identified by patient:  Other (please specify):  therapy once per week        EDUCATION and INSTRUCTION PROVIDED AT THIS VISIT:       Met with Pacheco today. He was referred by Nancy Mandujano MD and has seen Breezy Pathak PharmD to start Ozempic. He tells me his usual PCP is Lary Cuadra. Patient was diagnosed with diabetes last year. Broke his ankle in  the winter of 2023 and has not been able to get surgery due to elevated A1C. He sees Suzanne Melchor MD at [facility] ([phone]). Patient tells me that he would need his glucose to be at goal in about a month.     We had a discussed today about the need to start testing his glucose. A1C is typically done every three months and SMBG will show changes to glucose in a more timely manner. He would like to try for a jp 3 sensor. From that, we would be able to view his time in range and GMI. If those are looking controlled he could discuss with Ortho. I made to a call to Dr. Melchor today to see if SMBG data or Time in range goals would be sufficient to show good control versus A1C - left message.     Patient was started on Ozempic and has taken one dose. We discussed function of Ozempic and titration. He is working with PharmD.   Could consider insulin in place of Ozempic if glucose is not at goal as we would be able to titrate this every few days as opposed to every month with the ozempic. He is taking his second dose of Ozempic tomorrow. Tolerating well. We will need to see glucose readings or a jp report in order to assess results of current plan (metformin, Ozempic and diet changes).    Order placed for all SMBG supplies and jp 3 sensors.  Follow up scheduled for one week to review glucose readings or sensor data.    I asked patient to Self monitor with a meter if he cannot get the sensors. Recommend four times daily testing. Before each meal and at bedtime.     Patient-stated goal written and given to Pacheco David.  Verbalized and demonstrated understanding of instructions.     PLAN:    Start Jp 3 sensor OR do four times daily glucose testing before each meal and at bedtime.  Follow up in one week to review results.     FOLLOW-UP:    One week    Time spent with patient at today's visit was 47 minutes.      Any diabetes medication dose changes were made via the CDE Protocol and Collaborative Practice  Agreement with [place] and  [facility].  A copy of this encounter was provided to patient's referring provider.

## 2024-04-10 ENCOUNTER — TELEPHONE (OUTPATIENT)
Dept: FAMILY MEDICINE | Facility: CLINIC | Age: 34
End: 2024-04-10

## 2024-04-10 DIAGNOSIS — E11.9 TYPE 2 DIABETES MELLITUS WITHOUT COMPLICATION, WITH LONG-TERM CURRENT USE OF INSULIN (H): Primary | ICD-10-CM

## 2024-04-10 DIAGNOSIS — Z79.4 TYPE 2 DIABETES MELLITUS WITHOUT COMPLICATION, WITH LONG-TERM CURRENT USE OF INSULIN (H): Primary | ICD-10-CM

## 2024-04-10 NOTE — TELEPHONE ENCOUNTER
Hello,     This is [facility] requesting a few things for this patient's Medicare order of the Accu-chek Guide     The first is requesting an addendum to the clinic notes from 4/4/24 to include that pt is test blood glucose four times a day and we need to know why pt is testing four times a day since pt is testing high utilization per Medicare's guidelines.       The second request is for new prescriptions for lancets . The prescription needs to be written by the provider in which the patient was seen for their diabetes.      Prescription must be written by: Nancy Mandujano  Must include full supply name: Accu-chek Softclix Lanets  Quantity: 400  Refills: 1  SIG: Test Blood Sugar 4 times daily         If you have any questions regarding these requests please call us at [phone] or send a message to the [email or web address] pool     Thank you!     [facility]  Diabetes Care Services Team  [address] [place] [address]  Provider Phone: [phone]  Patient Line: [phone]  Fax: [phone]  E-mail: [email or web address]

## 2024-04-16 ENCOUNTER — VIRTUAL VISIT (OUTPATIENT)
Dept: EDUCATION SERVICES | Facility: CLINIC | Age: 34
End: 2024-04-16
Payer: MEDICARE

## 2024-04-16 VITALS — BODY MASS INDEX: 44.39 KG/M2 | WEIGHT: 275 LBS

## 2024-04-16 DIAGNOSIS — Z79.4 TYPE 2 DIABETES MELLITUS WITHOUT COMPLICATION, WITH LONG-TERM CURRENT USE OF INSULIN (H): ICD-10-CM

## 2024-04-16 DIAGNOSIS — E11.9 TYPE 2 DIABETES MELLITUS WITHOUT COMPLICATION, WITH LONG-TERM CURRENT USE OF INSULIN (H): ICD-10-CM

## 2024-04-16 PROCEDURE — 99207 PR NO BILLABLE SERVICE THIS VISIT: CPT | Mod: 95 | Performed by: NUTRITIONIST

## 2024-04-16 RX ORDER — BLOOD-GLUCOSE SENSOR
1 EACH MISCELLANEOUS
Qty: 2 EACH | Refills: 5 | Status: SHIPPED | OUTPATIENT
Start: 2024-04-16 | End: 2024-05-24

## 2024-04-16 NOTE — NURSING NOTE
Is the patient currently in the state of [place]? YES    Visit mode:VIDEO    If the visit is dropped, the patient can be reconnected by: VIDEO VISIT: Text to cell phone:   Telephone Information:   Mobile [phone]   Mobile Not on file.       Will anyone else be joining the visit? NO  (If patient encounters technical issues they should call [phone])    How would you like to obtain your AVS? MyChart    Are changes needed to the allergy or medication list? No    Are refills needed on medications prescribed by this physician? YES    Reason for visit: RECHECK and Video Visit    Ana MONDRAGON

## 2024-04-16 NOTE — PROGRESS NOTES
Virtual Visit Details    Type of service:  Video Visit     Originating Location (pt. Location): Home    Distant Location (provider location):  On-site  Platform used for Video Visit: Lamont    Diabetes Self-Management Education & Support    Pacheco David presents today for education related to Type 2 diabetes    Patient is being treated with:  Oral Agents and GLP-1 Agonist  He is accompanied by self    Referring provider:  Nancy Mandujano  Living Situation: with parents  Employment: unemployed    PATIENT CONCERNS/REASON FOR REFERRAL Patient needs to get his glucose under better control for Orthopedic Surgery to his ankle      ASSESSMENT:    Taking Medication:     Current Diabetes Management per Patient:  Taking diabetes medications? yes:     Diabetes Medication(s)       Biguanides       metFORMIN (GLUCOPHAGE XR) 500 MG 24 hr tablet Start taking 1000 mg (2 tablets) by mouth with dinner for 1 week, then increase to 1500 mg (3 tablets) for 1 week and then take 2000 mg (4 tabelts) thereafter       Incretin Mimetic Agents       semaglutide (OZEMPIC) 2 MG/3ML pen Inject 0.25 mg Subcutaneous every 7 days For 4 weeks then increase to 0.5 mg thereafter            Monitoring      Patient's most recent   Lab Results   Component Value Date    A1C 9.5 03/27/2024      Patient's A1C goal: <8.0    Activity: no regular exercise program    Healthy Eating:   Patient is eating more vegetables and protein and trying to watch his carb intake.      Problem Solving:      Patient is at risk of hypoglycemia?: NO  Hospitalizations for hyper or hypoglycemia:     Healthy Coping and Stress Management:   Sources of stress identified by patient: My health He is stressed about getting his glucose under control in a timely manner for surgery  Coping mechanisms identified by patient:  Other (please specify):  therapy once per week        EDUCATION and INSTRUCTION PROVIDED AT THIS VISIT:       Met with Pacheco muniz. He was referred by Nancy Mandujano MD and has seen  Breezy Pathak PharmD to start Ozempic. He tells me his usual PCP is Lary Cuadra. Patient was diagnosed with diabetes last year. Broke his ankle in the winter of 2023 and has not been able to get surgery due to elevated A1C. He sees Suzanne Melchor MD at [facility] ([phone]).     Pacheco has not gotten his testing supplies yet so no readings to review.    He is asking for assistance with care coordination and travel to appointments. Will ask our clinic  to reach out to patient.     Patient-stated goal written and given to Pacheco David.  Verbalized and demonstrated understanding of instructions.     PLAN:    Scripts [facility] to both [facility]    FOLLOW-UP:    Scheduled    Joined the call at 4/16/2024, 2:10:37 pm.  Left the call at 4/16/2024, 2:25:43 pm.  You were on the call for 15 minutes 5 seconds    Time spent with patient at today's visit was 15 minutes.      Any diabetes medication dose changes were made via the CDE Protocol and Collaborative Practice Agreement with [place] and  [employer].  A copy of this encounter was provided to patient's referring provider.

## 2024-04-17 ENCOUNTER — TELEPHONE (OUTPATIENT)
Dept: EDUCATION SERVICES | Facility: CLINIC | Age: 34
End: 2024-04-17
Payer: MEDICAID

## 2024-04-17 ENCOUNTER — TELEPHONE (OUTPATIENT)
Dept: FAMILY MEDICINE | Facility: CLINIC | Age: 34
End: 2024-04-17

## 2024-04-17 PROCEDURE — 99207 PR NO BILLABLE SERVICE THIS VISIT: CPT | Performed by: NUTRITIONIST

## 2024-04-17 NOTE — TELEPHONE ENCOUNTER
Received fax from pharmacy regarding blood glucose (NO BRAND SPECIFIED) lancets standard, stating missing/illegible information on RX - sig code:per Med  B policy, non -insulin pts can only test 1 time per day. Please send new RX with correct sig if appropriate.  Thanks!

## 2024-04-17 NOTE — TELEPHONE ENCOUNTER
General Call      Reason for Call:  Pharmacy told patient he couldn't get the free style stuart until he was on insulin.    What are your questions or concerns:  see above    Date of last appointment with provider:     Could we send this information to you in [place] or would you prefer to receive a phone call?:   Patient would prefer a phone call   Okay to leave a detailed message?: Yes at Home number on file [phone] (home)

## 2024-04-18 ENCOUNTER — TELEPHONE (OUTPATIENT)
Dept: FAMILY MEDICINE | Facility: CLINIC | Age: 34
End: 2024-04-18
Payer: MEDICAID

## 2024-04-18 DIAGNOSIS — Z79.4 TYPE 2 DIABETES MELLITUS WITHOUT COMPLICATION, WITH LONG-TERM CURRENT USE OF INSULIN (H): Primary | ICD-10-CM

## 2024-04-18 DIAGNOSIS — E11.9 TYPE 2 DIABETES MELLITUS WITHOUT COMPLICATION, WITH LONG-TERM CURRENT USE OF INSULIN (H): Primary | ICD-10-CM
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
## 2024-04-18 NOTE — TELEPHONE ENCOUNTER
Diagnoses and all orders for this visit:    Type 2 diabetes mellitus without complication, with long-term current use of insulin (H)  -     Adult Diabetes Education  Referral; Future

## 2024-04-18 NOTE — TELEPHONE ENCOUNTER
Called and spoke to Pacheco.    Suggested that we could try to appeal the stuart 3 denial given he has a phobia to needle sticks, but I am not sure that will work to get him the device. Will discuss with his PCP.    Suggested we could try a stuart pro after he has been on ozempic for a couple weeks to get a better idea of his glucose control at that time. Patient is agreeable to that. Since he would have to come into clinic to place and upload the sensor (two visits) he would like to do that closer to his home in [facility] [facility]. I will request follow up with CDE closer to home and new referral from PCP.

## 2024-04-22 ENCOUNTER — TRANSFERRED RECORDS (OUTPATIENT)
Dept: HEALTH INFORMATION MANAGEMENT | Facility: CLINIC | Age: 34
End: 2024-04-22
Payer: MEDICAID

## 2024-04-22 RX ORDER — LANCETS
EACH MISCELLANEOUS
Qty: 100 EACH | Refills: 6 | Status: SHIPPED | OUTPATIENT
Start: 2024-04-22 | End: 2024-04-22

## 2024-04-22 NOTE — TELEPHONE ENCOUNTER
Hello,    We are currently unable to fill Freestyle Jp 3 Sensors for the patient due to patient not meeting Medicare Guidelines. In order to meet guidelines we need the following:    Patient must be injecting insulin daily, or have history of problematic hypoglycemia. Per chart notes, does not look like either situation currently applies.    Medicare will cover manual testing supplies, however, the rxs for Accu-Chek have been cancelled.    Please note, pharmacy will need rxs for Accu-chek Meter, Strips and Lancets if patient is to be testing blood glucose manually. Please see below for additional medicare requirements needed.    Thank you!    Diabetes Care Services Pharmacy Team  [facility]  Phone: [phone]  Fax: [phone]  Pool: Pharm Diabetes

## 2024-04-22 NOTE — TELEPHONE ENCOUNTER
Message already sent to pharmacy, would like patient to check 4 times per day due to needing better control of blood sugars ASAP for orthopedic surgery.  Considering adding insulin if not able to get blood sugars under control.

## 2024-04-22 NOTE — TELEPHONE ENCOUNTER
Note from 4/4/2024 was addended to see the patient needs to check blood sugars 4 times per day.  Prescription for manual glucose meter, lancets, strips resent.  Please inform patient that Medicare does not cover CGM, unless patient would like to pay for out-of-pocket.

## 2024-04-23 DIAGNOSIS — G89.4 CHRONIC PAIN DISORDER: ICD-10-CM

## 2024-04-23 DIAGNOSIS — M79.2 NEUROPATHIC PAIN: ICD-10-CM

## 2024-04-23 RX ORDER — LIDOCAINE 50 MG/G
PATCH TOPICAL EVERY 24 HOURS
Qty: 30 PATCH | Refills: 4 | Status: SHIPPED | OUTPATIENT
Start: 2024-04-23

## 2024-04-25 RX ORDER — LANCETS
EACH MISCELLANEOUS
Qty: 400 EACH | Refills: 1 | Status: SHIPPED | OUTPATIENT
Start: 2024-04-25 | End: 2024-05-24

## 2024-04-25 NOTE — TELEPHONE ENCOUNTER
Hello,    We are still needing a new prescription for the lancets. Accu-chek Multiclix has been discontinue so I will not be able to use that prescription. Please send a new prescription for the Accu-chek Softclix lancets.    Prescription must be written by: Nancy Mandujano  Must include full supply name: Accu-chek Softclix Lanets  Quantity: 400  Refills: 1  SIG: Test Blood Sugar 4 times daily           If you have any questions regarding these requests please call us at [phone] or send a message to the [email or web address] pool     Thank you!     [facility]  Diabetes Care Services Team  [address] [place] [address]  Provider Phone: [phone]  Patient Line: [phone]  Fax: [phone]  E-mail: [email or web address]

## 2024-04-26 ENCOUNTER — VIRTUAL VISIT (OUTPATIENT)
Dept: PHARMACY | Facility: CLINIC | Age: 34
End: 2024-04-26
Payer: MEDICAID

## 2024-04-26 DIAGNOSIS — E11.9 TYPE 2 DIABETES MELLITUS WITHOUT COMPLICATION, WITH LONG-TERM CURRENT USE OF INSULIN (H): Primary | ICD-10-CM

## 2024-04-26 DIAGNOSIS — Z79.4 TYPE 2 DIABETES MELLITUS WITHOUT COMPLICATION, WITH LONG-TERM CURRENT USE OF INSULIN (H): Primary | ICD-10-CM

## 2024-04-26 PROCEDURE — 99207 PR NO CHARGE LOS: CPT | Mod: 93 | Performed by: PHARMACIST

## 2024-04-26 NOTE — PATIENT INSTRUCTIONS
"Recommendations from today's MTM visit:                                                    MTM (medication therapy management) is a service provided by a clinical pharmacist designed to help you get the most of out of your medicines.   Today we reviewed what your medicines are for, how to know if they are working, that your medicines are safe and how to make your medicine regimen as easy as possible.      Continue metformin  Increase Ozempic to 0.5 mg weekly as planned    Follow-up: Return in about 4 weeks (around 5/24/2024) for Follow up, with me, using a phone visit.    It was great speaking with you today.  I value your experience and would be very thankful for your time in providing feedback in our clinic survey. In the next few days, you may receive an email or text message from [employer] with a link to a survey related to your  clinical pharmacist.\"     To schedule another MTM appointment, please call the clinic directly or you may call the MTM scheduling line at [phone] or toll-free at [phone].     My Clinical Pharmacist's contact information:                                                      Please feel free to contact me with any questions or concerns you have.      Breezy Pathak, Pharm. D., BCACP  Medication Therapy Management Pharmacist    "

## 2024-04-29 ENCOUNTER — TELEPHONE (OUTPATIENT)
Dept: FAMILY MEDICINE | Facility: CLINIC | Age: 34
End: 2024-04-29
Payer: MEDICAID

## 2024-04-29 DIAGNOSIS — E11.9 TYPE 2 DIABETES MELLITUS WITHOUT COMPLICATION, WITH LONG-TERM CURRENT USE OF INSULIN (H): ICD-10-CM

## 2024-04-29 DIAGNOSIS — Z79.4 TYPE 2 DIABETES MELLITUS WITHOUT COMPLICATION, WITH LONG-TERM CURRENT USE OF INSULIN (H): ICD-10-CM

## 2024-04-29 NOTE — TELEPHONE ENCOUNTER
Updated blood glucose test strip Rx for Medicare compliance.     Daniel Farnsworth, PharmD  [facility]

## 2024-04-30 ENCOUNTER — PATIENT OUTREACH (OUTPATIENT)
Dept: CARE COORDINATION | Facility: CLINIC | Age: 34
End: 2024-04-30
Payer: MEDICAID

## 2024-04-30 NOTE — PROGRESS NOTES
4/30/2024  Clinic Care Coordination Contact  Community Health Worker Initial Outreach      Patient accepts CC: No, father will support to transport to appt no transportation issues at this. Patient will be sent Care Coordination introduction letter for future reference.     Recevied call from patient.  Spoke with patient.  Completed AIDET  Patient stated that transportation has been resolved.  Stated his father will support with transporting to appt  No issues with transportation     Patient declined CCC at this time.  Patient has CHW contact information if needs support from CCC.    CHW sent intro letter with CHW contact info    Order for Care Management has been closed, no further outreach will be done at this time and patient can be re-referred.     Jase Dasilva  Community Health Worker  [facility] Care Coordination  [email or web address]  [email or web address]   Office: [phone]  Fax: [phone]

## 2024-04-30 NOTE — LETTER
[facility] CARE COORDINATION  [address] [address]    April 30, 2024    Pacheco David  [address] [address]      Dear Pacheco,    I am a clinic community health worker who works with Lary Nicolas MD with the [facility]. I wanted to thank you for spending the time to talk with me.  Below is a description of clinic care coordination and how I can further assist you.       The clinic care coordination team is made up of a registered nurse, , financial resource worker and community health worker who understand the health care system. The goal of clinic care coordination is to help you manage your health and improve access to the health care system. Our team works alongside your provider to assist you in determining your health and social needs. We can help you obtain health care and community resources, providing you with necessary information and education. We can work with you through any barriers and develop a care plan that helps coordinate and strengthen the communication between you and your care team.  Our services are voluntary and are offered without charge to you personally.    Please feel free to contact me at [phone] with any questions or concerns regarding care coordination and what we can offer.      We are focused on providing you with the highest-quality healthcare experience possible.    Sincerely,     Jase Dasilva  Community Health Worker  [facility] Care Coordination  [email or web address]  [email or web address]   Office: [phone]  Fax: [phone]

## 2024-04-30 NOTE — Clinical Note
FYI Patient stated his father will transport and support him to appt Transportation resolved. Pt has CHW contact info if he needs support from CCC

## 2024-04-30 NOTE — PROGRESS NOTES
Clinic Care Coordination Contact    CC SW open and created new program for pt.    DAISHA Cross   Social Work Care Coordinator   [facility]  [phone]

## 2024-05-13 ENCOUNTER — VIRTUAL VISIT (OUTPATIENT)
Dept: EDUCATION SERVICES | Facility: CLINIC | Age: 34
End: 2024-05-13
Payer: COMMERCIAL

## 2024-05-13 ENCOUNTER — NURSE TRIAGE (OUTPATIENT)
Dept: FAMILY MEDICINE | Facility: CLINIC | Age: 34
End: 2024-05-13

## 2024-05-13 DIAGNOSIS — Z79.4 TYPE 2 DIABETES MELLITUS WITHOUT COMPLICATION, WITH LONG-TERM CURRENT USE OF INSULIN (H): Primary | ICD-10-CM

## 2024-05-13 DIAGNOSIS — E11.9 TYPE 2 DIABETES MELLITUS WITHOUT COMPLICATION, WITH LONG-TERM CURRENT USE OF INSULIN (H): Primary | ICD-10-CM

## 2024-05-13 PROCEDURE — 99207 PR NO BILLABLE SERVICE THIS VISIT: CPT | Mod: 95 | Performed by: NUTRITIONIST

## 2024-05-13 NOTE — PROGRESS NOTES
Virtual Visit Details    Type of service:  Video Visit     Originating Location (pt. Location): Home    Distant Location (provider location):  On-site  Platform used for Video Visit: Lamont      Diabetes Self-Management Education & Support    Pacheco David presents today for education related to Type 2 diabetes    Patient is being treated with:  Oral Agents and GLP-1 Agonist  He is accompanied by self    Referring provider:  Nancy Mandujano  Living Situation: with parents  Employment: unemployed    PATIENT CONCERNS/REASON FOR REFERRAL Patient needs to get his glucose under better control for Orthopedic Surgery to his ankle    Follow up today      ASSESSMENT:    Taking Medication:     Current Diabetes Management per Patient:  Taking diabetes medications? yes:     Diabetes Medication(s)       Biguanides       metFORMIN (GLUCOPHAGE XR) 500 MG 24 hr tablet Start taking 1000 mg (2 tablets) by mouth with dinner for 1 week, then increase to 1500 mg (3 tablets) for 1 week and then take 2000 mg (4 tabelts) thereafter       Incretin Mimetic Agents       semaglutide (OZEMPIC) 2 MG/3ML pen Inject 0.25 mg Subcutaneous every 7 days For 4 weeks then increase to 0.5 mg thereafter          Took second dose of ozempic 0.5 mg     Monitoring    No readings to review. Patient was not able to get the stuart CGM and is not testing his glucose.   Referral in place for stuart pro but patient has not been scheduled yet. He wants to go to a clinic closer to his home.     Patient's most recent   Lab Results   Component Value Date    A1C 9.5 03/27/2024      Patient's A1C goal: <8.0    Activity: no regular exercise program    Healthy Eating:   Patient is eating more vegetables and protein and trying to watch his carb intake.      Problem Solving:      Patient is at risk of hypoglycemia?: NO  Hospitalizations for hyper or hypoglycemia:     Healthy Coping and Stress Management:   Sources of stress identified by patient: My health He is stressed about  getting his glucose under control in a timely manner for surgery  Coping mechanisms identified by patient:  Other (please specify):  therapy once per week        EDUCATION and INSTRUCTION PROVIDED AT THIS VISIT:       Follow up with Pacheco muniz. He was referred by Nancy Mandujano MD and has seen Breezy Pathak PharmD for management of his Ozempic. He tells me his usual PCP is Lary Cuadra. Patient was diagnosed with diabetes last year. Broke his ankle in the winter of 2023 and has not been able to get surgery due to elevated A1C. He sees Suzanne Melchor MD at [facility] ([phone]).     No readings to review. Patient was not able to get the stuart CGM and is not testing his glucose.   Referral in place for stuart pro but patient has not been scheduled yet. He wants to go to a clinic closer to his home. I will reach out to our scheduling team to assist with getting him scheduled. I also provided him with the number to schedule.     Patient c/o bruising on his hands. He relates this with starting Ozempic. States he has not hit them on anything but does have numbness and tingling in his hands. I recommended he get it assessed by his PCP or go to urgent care for assessment. He will call his clinic. Message and note routed to primary and pharm D.     Patient-stated goal written and given to Pacheco David.  Verbalized and demonstrated understanding of instructions.     PLAN:    Scripts [facility]    FOLLOW-UP:    Scheduled    Joined the call at 5/13/2024, 9:33:36 am.  Left the call at 5/13/2024, 10:01:43 am.  You were on the call for 28 minutes 7 seconds    Time spent with patient at today's visit was 28 minutes.      Any diabetes medication dose changes were made via the CDE Protocol and Collaborative Practice Agreement with [facility].  A copy of this encounter was provided to patient's referring provider.

## 2024-05-13 NOTE — NURSING NOTE
Is the patient currently in the state of [place]? YES    Visit mode:VIDEO    If the visit is dropped, the patient can be reconnected by: VIDEO VISIT: Text to cell phone:   Telephone Information:   Mobile [phone]   Mobile Not on file.       Will anyone else be joining the visit? NO  (If patient encounters technical issues they should call [phone] :[phone])    How would you like to obtain your AVS? MyChart    Are changes needed to the allergy or medication list? No    Are refills needed on medications prescribed by this physician?     Reason for visit: RECHECK and Video Visit    Ana MONDRAGON

## 2024-05-13 NOTE — TELEPHONE ENCOUNTER
----- Message from Radha Whitehead RD sent at 5/13/2024  1:58 PM CDT -----  Regarding: Bruising  Hi Dr. Nicolas and Breezy,    I saw Pacheco today. He is c/o greenish bruising on his hands (top, not palm) and reports having numbness in his hands as well. He is relating worsening of bruising to increasing Ozempic, which is not a typical side effect.There can of course be bruising at injection site and it sounds like he has that, but no rash or itching.     I asked him to call clinic with his concerns.    He hasn't been scheduled near his home yet for the Jp Pro, so I have reached out to the schedulers to give him a call. I also provided him with the number to schedule. I won't be following up with him given he will establish with a new CDE for that appointment.     Thank you,    Radha Whitehead RD, LD, CDE  5/13/2024   2:04 PM

## 2024-05-20 ENCOUNTER — MYC REFILL (OUTPATIENT)
Dept: FAMILY MEDICINE | Facility: CLINIC | Age: 34
End: 2024-05-20
Payer: COMMERCIAL

## 2024-05-20 DIAGNOSIS — S82.842A ANKLE FRACTURE, BIMALLEOLAR, CLOSED, LEFT, INITIAL ENCOUNTER: ICD-10-CM

## 2024-05-20 RX ORDER — IBUPROFEN 600 MG/1
600 TABLET, FILM COATED ORAL EVERY 6 HOURS PRN
Qty: 50 TABLET | Refills: 2 | Status: SHIPPED | OUTPATIENT
Start: 2024-05-20 | End: 2024-09-06

## 2024-05-21 DIAGNOSIS — E11.9 TYPE 2 DIABETES MELLITUS WITHOUT COMPLICATION, WITH LONG-TERM CURRENT USE OF INSULIN (H): Primary | ICD-10-CM

## 2024-05-21 DIAGNOSIS — F32.2 MAJOR DEPRESSIVE DISORDER, SINGLE EPISODE, SEVERE (H): ICD-10-CM

## 2024-05-21 DIAGNOSIS — E78.5 HYPERLIPIDEMIA, UNSPECIFIED HYPERLIPIDEMIA TYPE: Chronic | ICD-10-CM

## 2024-05-21 DIAGNOSIS — G47.33 OBSTRUCTIVE SLEEP APNEA: ICD-10-CM

## 2024-05-21 DIAGNOSIS — Z79.4 TYPE 2 DIABETES MELLITUS WITHOUT COMPLICATION, WITH LONG-TERM CURRENT USE OF INSULIN (H): Primary | ICD-10-CM

## 2024-05-21 NOTE — TELEPHONE ENCOUNTER
Called pt to clarify his current dose.  PT says he is at the 0.5mg dose.  I adjusted the rx.  Will send to Dr. Nicolas.    Navin Mendoza RN-[facility]

## 2024-05-21 NOTE — TELEPHONE ENCOUNTER
Rx refilled for the 0.5 mg x4w, then 1 mg weekly.    Future Appointments   Date Time Provider Department Center   5/24/2024  3:30 PM Breezy Pathak [facility]   5/28/2024  2:30 PM Radha Michaud RD [facility]   6/4/2024 10:00 AM Radha Michaud RD [facility]   6/10/2024  1:30 PM Moni Bui RN [facility]   6/14/2024 10:00 AM [facility]      Health Maintenance Due   Topic Date Due    EYE EXAM  Never done    Pneumococcal Vaccine: Pediatrics (0 to 5 Years) and At-Risk Patients (6 to 64 Years) (2 of 2 - PCV) 01/10/2014    MEDICARE ANNUAL WELLNESS VISIT  07/17/2021    URINE DRUG SCREEN  04/07/2024    DEPRESSION 12 MO INDEX REPEAT PHQ-9  04/10/2024     BP Readings from Last 3 Encounters:   03/27/24 131/87   03/16/24 117/62   03/03/24 119/69     Type 2 diabetes mellitus without complication, with long-term current use of insulin (H)  BMI 40.0-44.9, adult (H)  Obstructive sleep apnea  Hyperlipidemia, unspecified hyperlipidemia type  Major Depression Severe Single Episode  -     semaglutide (OZEMPIC) 2 MG/3ML pen; Inject 0.5 mg Subcutaneous every 7 days for 28 days  -     Semaglutide, 1 MG/DOSE, (OZEMPIC) 4 MG/3ML pen; Inject 1 mg Subcutaneous every 7 days

## 2024-05-21 NOTE — TELEPHONE ENCOUNTER
Medication Question or Refill        What medication are you calling about (include dose and sig)?: semaglutide (OZEMPIC) 2 MG/3ML pen     Preferred Pharmacy:    [id number] [address] & [address] [address]  Phone: [phone] Fax: [phone]      Controlled Substance Agreement on file:   CSA -- Patient Level:    CSA: None found at the patient level.       Who prescribed the medication?: Kwame Lopez MD     Do you need a refill? Yes    When did you use the medication last? 05/17/2024    Patient offered an appointment? No    Do you have any questions or concerns?  Yes: completely out of medication      Could we [facility] this [facility] you [facility] or would you prefer to receive a phone call?:   Patient would prefer a phone call   Okay to leave a detailed message?: Yes at Cell number on file:    Telephone Information:   Mobile [phone]   Mobile Not on file.

## 2024-05-22 DIAGNOSIS — G47.33 OBSTRUCTIVE SLEEP APNEA: ICD-10-CM

## 2024-05-22 DIAGNOSIS — F32.2 MAJOR DEPRESSIVE DISORDER, SINGLE EPISODE, SEVERE (H): ICD-10-CM

## 2024-05-22 DIAGNOSIS — E11.9 TYPE 2 DIABETES MELLITUS WITHOUT COMPLICATION, WITH LONG-TERM CURRENT USE OF INSULIN (H): ICD-10-CM

## 2024-05-22 DIAGNOSIS — E78.5 HYPERLIPIDEMIA, UNSPECIFIED HYPERLIPIDEMIA TYPE: Chronic | ICD-10-CM

## 2024-05-22 DIAGNOSIS — Z79.4 TYPE 2 DIABETES MELLITUS WITHOUT COMPLICATION, WITH LONG-TERM CURRENT USE OF INSULIN (H): ICD-10-CM

## 2024-05-22 RX ORDER — SEMAGLUTIDE 1.34 MG/ML
INJECTION, SOLUTION SUBCUTANEOUS
Qty: 3 ML | Refills: 0 | OUTPATIENT
Start: 2024-05-22

## 2024-05-22 NOTE — TELEPHONE ENCOUNTER
"Nurse Triage SBAR    Is this a 2nd Level Triage? NO    Situation: unexplained bruising    Background: called patient to triage symptoms reported to PCP by diabetic educator--see message thread below.     Patient reports history of unexplained bruising about 1 year ago. At the time, he had several dark green bruises on his chest and bilateral arms. He discussed this with his neurosurgeon who deferred patient to PCP--patient did not follow up. Reports the bruises at that time lasted about 6 months but did eventually resolve on their own. Bruising has since returned.     Assessment:   -reports dark \"greenish\" bruising to bilateral dorsal hands  -started beginning of May, approximately 3 weeks ago  -notes one large bruise approx 4\" in diameter to dorsum of each hand  -neither worsening nor improving since onset  -denies bruising or active bleeding elsewhere  -very mildly painful with movement or palpation otherwise is not bothersome  -denies fevers or recent illnesses  -denies injury or trauma    Protocol Recommended Disposition:   See in Office Within 3 Days    Recommendation: no available appointments with PCP--scheduled with available colleague tomorrow.    Next 5 appointments (look out 90 days)      May 23, 2024  2:00 PM  (Arrive by 1:40 PM)  Provider Visit with Juan Carlos Simon MD  [facility] ([facility] ) [address] [place] [address] [phone]     May 24, 2024  3:30 PM  Pharmacist Visit with Breezy Pathak RPH  [facility] ([facility] ) [address]  [address] [facility] [address] [phone]          Natasha Welch RN BSN  [facility]    -----------------------------------------------------  Reason for Disposition   After 3 weeks and bruise still present   Less than 5 unxplained bruises now, NOT caused by an injury   After 10 days and bruise not " fading    Additional Information   Negative: Shock suspected (e.g., cold/pale/clammy skin, too weak to stand, low BP, rapid pulse)   Negative: Fever and purple or blood-colored spots or dots   Negative: Sounds like a life-threatening emergency to the triager   Negative: Bruise(s) of forehead or head   Negative: Bruise(s) of face or jaw   Negative: Patient has a concerning injury (e.g., chest, neck, leg)   Negative: Post-operative bruising   Negative: Dizziness or lightheadedness   Negative: Bruise on head, face, chest, or abdomen and taking Coumadin (warfarin) or other strong blood thinner, or known bleeding disorder (e.g., thrombocytopenia)   Negative: Unexplained bleeding from another site (e.g., gums, nose, urine) as well   Negative: Patient sounds very sick or weak to the triager   Negative: SEVERE pain and not improved 2 hours after pain medicine/ice packs   Negative: Purple or blood-colored spots or dots that are not from injury or friction (no fever and sounds well to triager)   Negative: 5 or more bruises now, NOT caused by an injury   Negative: Raised bruise and size > 2 inches (5 cm) and getting bigger   Negative: Taking Coumadin (warfarin) or other strong blood thinner, or known bleeding disorder (e.g., thrombocytopenia)   Negative: Suspicious history for the injury   Negative: Minor bruising at site of heparin injection (e.g., heparin, Fragmin, Innohep, Lovenox)    Protocols used: Bruises-A-OH

## 2024-05-24 ENCOUNTER — VIRTUAL VISIT (OUTPATIENT)
Dept: PHARMACY | Facility: CLINIC | Age: 34
End: 2024-05-24
Payer: MEDICAID

## 2024-05-24 DIAGNOSIS — Z79.4 TYPE 2 DIABETES MELLITUS WITHOUT COMPLICATION, WITH LONG-TERM CURRENT USE OF INSULIN (H): Primary | ICD-10-CM

## 2024-05-24 DIAGNOSIS — E11.9 TYPE 2 DIABETES MELLITUS WITHOUT COMPLICATION, WITH LONG-TERM CURRENT USE OF INSULIN (H): Primary | ICD-10-CM

## 2024-05-24 PROCEDURE — 99207 PR NO CHARGE LOS: CPT | Mod: 93 | Performed by: PHARMACIST

## 2024-05-24 NOTE — PROGRESS NOTES
Medication Therapy Management (MTM) Encounter    ASSESSMENT:                            Medication Adherence/Access: No issues identified    Diabetes:   Patient is not meeting A1c goal of < 7%.  Patient would benefit from decreasing metformin due to diarrhea. Can consider increasing Ozempic again at next visit. If blood glucose still not at goal would recommend an SGLT-2 inhibitor such as Jardiance..     PLAN:                            Decrease metformin down to 2 tablets daily    Follow-up: Return in about 4 weeks (around 6/21/2024) for Follow up, with me.    SUBJECTIVE/OBJECTIVE:                          Pacheco David is a 33 year old male called for a follow-up visit.       Reason for visit: diabetes follow-up.    Allergies/ADRs: Reviewed in chart  Past Medical History: Reviewed in chart  Tobacco: He reports that he has never smoked. He has never been exposed to tobacco smoke. He has never used smokeless tobacco.  Alcohol: none    Medication Adherence/Access: no issues reported    Diabetes   Ozempic 1 mg once weekly starting today.   Metformin XL 2000 mg daily - he is getting diarrhea again.     Patient is not experiencing side effects.  Blood sugar monitoring: never. Going to use the Jp pro.   Current diabetes symptoms: polydipsia, polyphagia, fatigue, and numbness/tingling    Diet/Exercise: Reports he has been eating more           Eye exam in the last 12 months? No  Foot exam is up to date    Lab Results   Component Value Date    UMALCR 14.53 03/27/2024      Lab Results   Component Value Date    A1C 9.5 03/27/2024    A1C 7.4 04/07/2023    A1C 6.2 03/07/2022    A1C 5.6 07/17/2020           Today's Vitals: There were no vitals taken for this visit.  ----------------      I spent 5 minutes with this patient today. All changes were made via collaborative practice agreement with Lary Nicolas MD. A copy of the visit note was provided to the patient's provider(s).    A summary of these recommendations was sent via  [place].    Breezy Pathak, Pharm. D., BCACP  Medication Therapy Management Pharmacist      Telemedicine Visit Details  Type of service:  Telephone visit  Start Time:  3:33 pm  End Time:  3:38 pm     Medication Therapy Recommendations  Diabetes mellitus, type 2 (H)    Current Medication: metFORMIN (GLUCOPHAGE XR) 500 MG 24 hr tablet   Rationale: Undesirable effect - Adverse medication event - Safety   Recommendation: Decrease Dose   Status: Accepted - no CPA Needed

## 2024-05-24 NOTE — PATIENT INSTRUCTIONS
"Recommendations from today's MTM visit:                                                    MTM (medication therapy management) is a service provided by a clinical pharmacist designed to help you get the most of out of your medicines.   Today we reviewed what your medicines are for, how to know if they are working, that your medicines are safe and how to make your medicine regimen as easy as possible.    Decrease metformin down to 2 tablets daily    Follow-up: Return in about 4 weeks (around 6/21/2024) for Follow up, with me.    It was great speaking with you today.  I value your experience and would be very thankful for your time in providing feedback in our clinic survey. In the next few days, you may receive an email or text message from [employer] with a link to a survey related to your  clinical pharmacist.\"     To schedule another MTM appointment, please call the clinic directly or you may call the MTM scheduling line at [phone] or toll-free at [phone].     My Clinical Pharmacist's contact information:                                                      Please feel free to contact me with any questions or concerns you have.      Breezy Ptahak, Pharm. D., BCACP  Medication Therapy Management Pharmacist    "

## 2024-05-28 ENCOUNTER — OFFICE VISIT (OUTPATIENT)
Dept: EDUCATION SERVICES | Facility: CLINIC | Age: 34
End: 2024-05-28
Payer: COMMERCIAL

## 2024-05-28 DIAGNOSIS — E11.9 TYPE 2 DIABETES MELLITUS WITHOUT COMPLICATION, WITH LONG-TERM CURRENT USE OF INSULIN (H): ICD-10-CM

## 2024-05-28 DIAGNOSIS — Z79.4 TYPE 2 DIABETES MELLITUS WITHOUT COMPLICATION, WITH LONG-TERM CURRENT USE OF INSULIN (H): ICD-10-CM

## 2024-05-28 PROCEDURE — G0108 DIAB MANAGE TRN  PER INDIV: HCPCS | Performed by: DIETITIAN, REGISTERED

## 2024-05-28 RX ORDER — BLOOD SUGAR DIAGNOSTIC
STRIP MISCELLANEOUS
Qty: 50 STRIP | Refills: 6 | Status: SHIPPED | OUTPATIENT
Start: 2024-05-28

## 2024-05-28 RX ORDER — LANCETS
EACH MISCELLANEOUS
Qty: 100 EACH | Refills: 4 | Status: SHIPPED | OUTPATIENT
Start: 2024-05-28

## 2024-05-28 NOTE — LETTER
5/28/2024         RE: Pacheco David  [address] [facility] [id number]        Dear Colleague,    Thank you for referring your patient, Pacheco David, to the [facility]. Please see a copy of my visit note below.    Diabetes Self-Management Education & Support/ 64 minutes    Presents for: Follow-up    Type of Service: In Person Visit      ASSESSMENT:  Pacheco is not currently checking his BG at home, he was instructed in clinic today on the accu check guide meter with FBS=95 mg/dl. He had a stuart pro placed today, as Medicare will not cover a CGM system unless patient is on insulin. Pacheco is currently taking Metformin XR 1000 mg, he cannot tolerate a higher dose due to diarrhea and Ozempic 1 mg, first dose 5/24/24. Pacheco has made significant dietary changes since March 2024: he is not longer drinking sugary beverages, he cut his rice intake by 50% and changed to brown rice and whole wheat bread, he is not having sugary treats, he is consuming lean proteins, adding vegetables with meals and hydrating with water and unsweetened green tea. He has noticed weight loss, but no significant decrease in appetite since starting Ozempic. Pacheco reports of poor sleep, maybe 4-5 hrs/night.  Pacheco has not had a recent eye exam or dental cleaning, he does not brush or floss his teeth on a regular basis.     Stuart pro placement:  SN: [id number]  Lot: [id number]    Patient's most recent   Lab Results   Component Value Date    A1C 9.5 03/27/2024     is not meeting goal of <8.0    Diabetes knowledge and skills assessment:   Patient is knowledgeable in diabetes management concepts related to: Healthy Eating and Taking Medication    Continue education with the following diabetes management concepts: Monitoring, Taking Medication, and Reducing Risks    Based on learning assessment above, most appropriate setting for further diabetes education would be: Individual setting.      PLAN  Check blood sugar once daily: either  "in the morning before eating or two hours after a meal.  Start brushing teeth twice daily and flossing once daily.  Call your health insurance and ask them about dental coverage.   Take Ozempic 1mg through June 14th.  Limit carbohydrate containing foods to no more than 1/4 of your plate, add in non-starchy vegetables and lean protein.      Topics to cover at upcoming visits: Monitoring, Taking Medication, and Reducing Risks    Follow-up: 6/14/24    See Care Plan for co-developed, patient-state behavior change goals.  AVS provided for patient today.    Education Materials Provided:  [employer] Healthy Living with Diabetes Book and My Plate Planner      SUBJECTIVE/OBJECTIVE:  Presents for: Follow-up  Accompanied by: Self  Diabetes education in the past 24mo: Yes  Focus of Visit: Monitoring, Reducing Risks, Healthy Eating, Being Active  Diabetes type: Type 2  Date of diagnosis: 2023  Transportation concerns: Yes  Other concerns:: None  Cultural Influences/Ethnic Background:  Not  or       Diabetes Symptoms & Complications:  Diabetes Related Symptoms: Neuropathy, Polydipsia (increased thirst), Polyuria (increased urination), Visual change, Fatigue  Weight trend: Decreasing       Patient Problem List and Family Medical History reviewed for relevant medical history, current medical status, and diabetes risk factors.    Vitals:  There were no vitals taken for this visit.  Estimated body mass index is 44.39 kg/m  as calculated from the following:    Height as of 3/27/24: 1.676 m (5' 6\").    Weight as of 4/16/24: 124.7 kg (275 lb).   Last 3 BP:   BP Readings from Last 3 Encounters:   03/27/24 131/87   03/16/24 117/62   03/03/24 119/69       History   Smoking Status     Never   Smokeless Tobacco     Never       Labs:  Lab Results   Component Value Date    A1C 9.5 03/27/2024     Lab Results   Component Value Date     03/27/2024     10/06/2021     Lab Results   Component Value Date    LDL 64 " "03/27/2024     Direct Measure HDL   Date Value Ref Range Status   03/27/2024 39 (L) >=40 mg/dL Final   ]  GFR Estimate   Date Value Ref Range Status   03/27/2024 >90 >60 mL/min/1.73m2 Final   08/05/2020 >60 >60 mL/min/1.73m2 Final     GFR, ESTIMATED POCT   Date Value Ref Range Status   03/06/2024 >60 >60 mL/min/1.73m2 Final     GFR Estimate If Black   Date Value Ref Range Status   08/05/2020 >60 >60 mL/min/1.73m2 Final     Lab Results   Component Value Date    CR 0.67 03/27/2024     No results found for: \"MICROALBUMIN\"    Healthy Eating:  Healthy Eating Assessed Today: Yes  Meal planning/habits: None  Meals include: Lunch, Dinner  Lunch: salad/boiled eggs(2), some croutons/ranch, water or green tea  since march 23rd  Dinner: whole wheat bread, 2 or brown rice one cup with vegetables and protein  Snacks: afternoon: fruit  Other: before he was having cereal with meals, rice/pasta, used to have sweets and sugary beverages,  Beverages: Water, Tea (used to have 2 sodas or energy drinks/day)  Has patient met with a dietitian in the past?: Yes    Being Active:  Being Active Assessed Today: Yes  Exercise:: Currently not exercising (patient is not able to exercise as this time, he is using an electric wheelchair and is not able to walk at this time.)  Barrier to exercise: Physical limitation    Monitoring:  Monitoring Assessed Today: Yes (patient did not  testing supplies, he said he was nervous to start testing his BG and is a visual learner so learns best watching. He was instructed on an accu check meter today with FBS=95 mg/dl)  Times checking blood sugar at home (number): Never        Taking Medications:  Diabetes Medication(s)       Biguanides       metFORMIN (GLUCOPHAGE XR) 500 MG 24 hr tablet Start taking 1000 mg (2 tablets) by mouth with dinner for 1 week, then increase to 1500 mg (3 tablets) for 1 week and then take 2000 mg (4 tabelts) thereafter       Incretin Mimetic Agents       Semaglutide, 1 MG/DOSE, " (OZEMPIC) 4 MG/3ML pen Inject 1 mg Subcutaneous every 7 days     semaglutide (OZEMPIC) 2 MG/3ML pen Inject 0.5 mg Subcutaneous every 7 days for 28 days     Patient not taking: Reported on 5/28/2024            Taking Medication Assessed Today: Yes  Current Treatments: Diet, Non-insulin Injectables, Oral Medication (taken by mouth)  Problems taking diabetes medications regularly?: No  Diabetes medication side effects?: Yes (diarrhea with > 1000 mg of metformin)    Problem Solving:  Problem Solving Assessed Today: Yes              Reducing Risks:  Reducing Risks Assessed Today: Yes  Diabetes Risks: Sedentary Lifestyle, Ethnicity  CAD Risks: Diabetes Mellitus, Male sex, Sedentary lifestyle  Has dilated eye exam at least once a year?: No  Sees dentist every 6 months?: No    Healthy Coping:  Healthy Coping Assessed Today: Yes  Emotional response to diabetes: Ready to learn  Informal Support system:: Other  Stage of change: ACTION (Actively working towards change)  Patient Activation Measure Survey Score:       No data to display                  Care Plan and Education Provided:  Healthy Eating: Balanced meals, Plate planning method, and Portion control, Monitoring: Frequency of monitoring, Individual glucose targets, and Log and interpret results, Taking Medication: Action of prescribed medication(s) and When to take medication(s), Reducing Risks: Dental care, Eye care, and Goal for A1c, how it relates to glucose and how often to check, and Healthy Coping: Benefits of making appropriate lifestyle changes and Utilize support systems        Time Spent: 64 minutes  Encounter Type: Individual    Any diabetes medication dose changes were made via the CDE Protocol per the patient's primary care provider. A copy of this encounter was shared with the provider.

## 2024-05-28 NOTE — PATIENT INSTRUCTIONS
Check blood sugar once daily: either in the morning before eating or two hours after a meal.  Start brushing teeth twice daily and flossing once daily.  Call your health insurance and ask them about dental coverage.   Take Ozempic 1mg through June 14th.  Limit carbohydrate containing foods to no more than 1/4 of your plate, add in non-starchy vegetables and lean protein.

## 2024-05-29 NOTE — PROGRESS NOTES
Diabetes Self-Management Education & Support/ 64 minutes    Presents for: Follow-up    Type of Service: In Person Visit      ASSESSMENT:  Pacheco is not currently checking his BG at home, he was instructed in clinic today on the accu check guide meter with FBS=95 mg/dl. He had a stuart pro placed today, as Medicare will not cover a CGM system unless patient is on insulin. Pacheco is currently taking Metformin XR 1000 mg, he cannot tolerate a higher dose due to diarrhea and Ozempic 1 mg, first dose 5/24/24. Pacheco has made significant dietary changes since March 2024: he is not longer drinking sugary beverages, he cut his rice intake by 50% and changed to brown rice and whole wheat bread, he is not having sugary treats, he is consuming lean proteins, adding vegetables with meals and hydrating with water and unsweetened green tea. He has noticed weight loss, but no significant decrease in appetite since starting Ozempic. Pacheco reports of poor sleep, maybe 4-5 hrs/night.  Pacheco has not had a recent eye exam or dental cleaning, he does not brush or floss his teeth on a regular basis.     Stuart pro placement:  SN: [id number]  Lot: [id number]    Patient's most recent   Lab Results   Component Value Date    A1C 9.5 03/27/2024     is not meeting goal of <8.0    Diabetes knowledge and skills assessment:   Patient is knowledgeable in diabetes management concepts related to: Healthy Eating and Taking Medication    Continue education with the following diabetes management concepts: Monitoring, Taking Medication, and Reducing Risks    Based on learning assessment above, most appropriate setting for further diabetes education would be: Individual setting.      PLAN  Check blood sugar once daily: either in the morning before eating or two hours after a meal.  Start brushing teeth twice daily and flossing once daily.  Call your health insurance and ask them about dental coverage.   Take Ozempic 1mg through June 14th.  Limit carbohydrate  "containing foods to no more than 1/4 of your plate, add in non-starchy vegetables and lean protein.      Topics to cover at upcoming visits: Monitoring, Taking Medication, and Reducing Risks    Follow-up: 6/14/24    See Care Plan for co-developed, patient-state behavior change goals.  AVS provided for patient today.    Education Materials Provided:  [employer] Healthy Living with Diabetes Book and My Plate Planner      SUBJECTIVE/OBJECTIVE:  Presents for: Follow-up  Accompanied by: Self  Diabetes education in the past 24mo: Yes  Focus of Visit: Monitoring, Reducing Risks, Healthy Eating, Being Active  Diabetes type: Type 2  Date of diagnosis: 2023  Transportation concerns: Yes  Other concerns:: None  Cultural Influences/Ethnic Background:  Not  or       Diabetes Symptoms & Complications:  Diabetes Related Symptoms: Neuropathy, Polydipsia (increased thirst), Polyuria (increased urination), Visual change, Fatigue  Weight trend: Decreasing       Patient Problem List and Family Medical History reviewed for relevant medical history, current medical status, and diabetes risk factors.    Vitals:  There were no vitals taken for this visit.  Estimated body mass index is 44.39 kg/m  as calculated from the following:    Height as of 3/27/24: 1.676 m (5' 6\").    Weight as of 4/16/24: 124.7 kg (275 lb).   Last 3 BP:   BP Readings from Last 3 Encounters:   03/27/24 131/87   03/16/24 117/62   03/03/24 119/69       History   Smoking Status    Never   Smokeless Tobacco    Never       Labs:  Lab Results   Component Value Date    A1C 9.5 03/27/2024     Lab Results   Component Value Date     03/27/2024     10/06/2021     Lab Results   Component Value Date    LDL 64 03/27/2024     Direct Measure HDL   Date Value Ref Range Status   03/27/2024 39 (L) >=40 mg/dL Final   ]  GFR Estimate   Date Value Ref Range Status   03/27/2024 >90 >60 mL/min/1.73m2 Final   08/05/2020 >60 >60 mL/min/1.73m2 Final     GFR, " "ESTIMATED POCT   Date Value Ref Range Status   03/06/2024 >60 >60 mL/min/1.73m2 Final     GFR Estimate If Black   Date Value Ref Range Status   08/05/2020 >60 >60 mL/min/1.73m2 Final     Lab Results   Component Value Date    CR 0.67 03/27/2024     No results found for: \"MICROALBUMIN\"    Healthy Eating:  Healthy Eating Assessed Today: Yes  Meal planning/habits: None  Meals include: Lunch, Dinner  Lunch: salad/boiled eggs(2), some croutons/ranch, water or green tea  since march 23rd  Dinner: whole wheat bread, 2 or brown rice one cup with vegetables and protein  Snacks: afternoon: fruit  Other: before he was having cereal with meals, rice/pasta, used to have sweets and sugary beverages,  Beverages: Water, Tea (used to have 2 sodas or energy drinks/day)  Has patient met with a dietitian in the past?: Yes    Being Active:  Being Active Assessed Today: Yes  Exercise:: Currently not exercising (patient is not able to exercise as this time, he is using an electric wheelchair and is not able to walk at this time.)  Barrier to exercise: Physical limitation    Monitoring:  Monitoring Assessed Today: Yes (patient did not  testing supplies, he said he was nervous to start testing his BG and is a visual learner so learns best watching. He was instructed on an accu check meter today with FBS=95 mg/dl)  Times checking blood sugar at home (number): Never        Taking Medications:  Diabetes Medication(s)       Biguanides       metFORMIN (GLUCOPHAGE XR) 500 MG 24 hr tablet Start taking 1000 mg (2 tablets) by mouth with dinner for 1 week, then increase to 1500 mg (3 tablets) for 1 week and then take 2000 mg (4 tabelts) thereafter       Incretin Mimetic Agents       Semaglutide, 1 MG/DOSE, (OZEMPIC) 4 MG/3ML pen Inject 1 mg Subcutaneous every 7 days     semaglutide (OZEMPIC) 2 MG/3ML pen Inject 0.5 mg Subcutaneous every 7 days for 28 days     Patient not taking: Reported on 5/28/2024            Taking Medication Assessed Today: " Yes  Current Treatments: Diet, Non-insulin Injectables, Oral Medication (taken by mouth)  Problems taking diabetes medications regularly?: No  Diabetes medication side effects?: Yes (diarrhea with > 1000 mg of metformin)    Problem Solving:  Problem Solving Assessed Today: Yes              Reducing Risks:  Reducing Risks Assessed Today: Yes  Diabetes Risks: Sedentary Lifestyle, Ethnicity  CAD Risks: Diabetes Mellitus, Male sex, Sedentary lifestyle  Has dilated eye exam at least once a year?: No  Sees dentist every 6 months?: No    Healthy Coping:  Healthy Coping Assessed Today: Yes  Emotional response to diabetes: Ready to learn  Informal Support system:: Other  Stage of change: ACTION (Actively working towards change)  Patient Activation Measure Survey Score:       No data to display                  Care Plan and Education Provided:  Healthy Eating: Balanced meals, Plate planning method, and Portion control, Monitoring: Frequency of monitoring, Individual glucose targets, and Log and interpret results, Taking Medication: Action of prescribed medication(s) and When to take medication(s), Reducing Risks: Dental care, Eye care, and Goal for A1c, how it relates to glucose and how often to check, and Healthy Coping: Benefits of making appropriate lifestyle changes and Utilize support systems        Time Spent: 64 minutes  Encounter Type: Individual    Any diabetes medication dose changes were made via the CDE Protocol per the patient's primary care provider. A copy of this encounter was shared with the provider.

## 2024-06-01 ENCOUNTER — TRANSFERRED RECORDS (OUTPATIENT)
Dept: MULTI SPECIALTY CLINIC | Facility: CLINIC | Age: 34
End: 2024-06-01

## 2024-06-01 LAB — RETINOPATHY: NORMAL

## 2024-06-06 ENCOUNTER — TRANSFERRED RECORDS (OUTPATIENT)
Dept: HEALTH INFORMATION MANAGEMENT | Facility: CLINIC | Age: 34
End: 2024-06-06
Payer: COMMERCIAL

## 2024-06-12 ENCOUNTER — NURSE TRIAGE (OUTPATIENT)
Dept: FAMILY MEDICINE | Facility: CLINIC | Age: 34
End: 2024-06-12
Payer: COMMERCIAL

## 2024-06-12 ENCOUNTER — TELEPHONE (OUTPATIENT)
Dept: FAMILY MEDICINE | Facility: CLINIC | Age: 34
End: 2024-06-12
Payer: COMMERCIAL

## 2024-06-12 NOTE — TELEPHONE ENCOUNTER
Nurse Triage SBAR    Is this a 2nd Level Triage? YES, LICENSED PRACTITIONER REVIEW IS REQUIRED    Situation: Patient called in to request muscle relaxer medication for muscle stiffness of the abdomen and right neck pain.       Background: Patient was in the hospital May 30,2024 at [facility] with muscle relaxer prescribed and effective.     Assessment:    Muscle stiffness of the abdomen and right neck affecting ADLs and sleep..    Protocol Recommended Disposition:   See in Office Within 3 Days  Patient has already have an appt with pcp scheduled for 6/14/24 but patient is requesting muscle relaxer medication before appointment.  Have not had sleep for the past two nights as result to muscle stiffness.  Medication can be sent to pharmacy below.    [id number] [place], [place] - [address] [facility] [address]       Recommendation: Keep appt with provider as scheduled.  If symptoms worsening, please proceed to ED.      Routed to provider  Dr. Nicolas/Care team.    Does the patient meet one of the following criteria for ADS visit consideration? 16+ years old, with an [facility] PCP     TIP  Providers, please consider if this condition is appropriate for management at one of our Acute and Diagnostic Services sites.     If patient is a good candidate, please use dotphrase <dot>triageresponse and select Refer to ADS to document.      Gino Tipton RN  [facility]        Reason for Disposition   MODERATE pain (e.g., interferes with normal activities) and present > 3 days    Additional Information   Negative: Muscle aches are unexplained and occur within 1 month of a tick bite   Negative: Diabetes mellitus or weak immune system (e.g., HIV positive, cancer chemo, splenectomy, organ transplant, chronic steroids)   Negative: Fever present > 3 days (72 hours)   Negative: SEVERE pain (e.g., excruciating, unable to do any normal activities) and not improved 2 hours after pain medicine    "Negative: SEVERE pain and taking a statin medicine (a lipid or cholesterol lowering drug)   Negative: Fever > 104 F (40 C)   Negative: Fever > 101 F (38.3 C) and over 60 years of age   Negative: Fever > 100.0 F  (37.8 C) and bedridden (e.g., CVA, chronic illness, recovering from surgery)   Negative: Fever > 100.0 F (37.8 C) and indwelling urinary catheter (e.g., [place], coude)   Negative: Fever > 100.0 F (37.8 C) and diabetes mellitus or weak immune system (e.g., HIV positive, cancer chemo, splenectomy, organ transplant, chronic steroids)   Negative: Shock suspected (e.g., cold/pale/clammy skin, too weak to stand, low BP, rapid pulse)   Negative: Difficult to awaken or acting confused (e.g., disoriented, slurred speech)   Negative: Sounds like a life-threatening emergency to the triager   Negative: Chest pain   Negative: Arm pains with exertion (e.g., walking)   Negative: Muscle aches from influenza (flu) suspected   Negative: Muscle aches from heat exposure suspected   Negative: Lyme disease suspected (e.g., bull's eye rash or tick bite / exposure in past month)   Negative: Pain only in back   Negative: Pain in one arm OR arm pains caused by recent vigorous activity (e.g., sports, lifting, overuse)   Negative: Pain in one leg OR leg pains caused by recent vigorous activity (e.g., sports, lifting, overuse)   Negative: Rash over large area or most of the body (widespread or generalized)   Negative: Dark (cola or tea-colored) or red-colored urine   Negative: Drinking very little and dehydration suspected (e.g., no urine > 12 hours, very dry mouth, very lightheaded)   Negative: Patient sounds very sick or weak to the triager    Answer Assessment - Initial Assessment Questions  1. 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 LOCATION: \"What part of your body is hurting?\" (e.g., entire body, arms, legs)      Abdomen and right side of " "neck.  Muscles are so stiff that is pulling/hurting where shunt is placed onto right side of neck.  This causes patient the inability to perform ADLs and affecting sleep.     3. SEVERITY: \"How bad is the pain?\" (Scale 1-10; or mild, moderate, severe)    - MILD (1-3): doesn't interfere with normal activities     - MODERATE (4-7): interferes with normal activities or awakens from sleep     - SEVERE (8-10):  excruciating pain, unable to do any normal activities       9/10  Medications used currently are not effective to manage pain.  Was hospitalized recently with muscle relaxer prescribed and this has helped greatly to manage pain leading to the ability to maintain productive lifestyle.    4. CAUSE: \"What do you think is causing the pains?\"      Nerve compression from lower spine.     5. FEVER: \"Have you been having fever?\"      Denied.     6. OTHER SYMPTOMS: \"Do you have any other symptoms?\" (e.g., chest pain, weakness, rash, cold or flu symptoms, weight loss)      Chest discomfort \"not heart related.\"     7. PREGNANCY: \"Is there any chance you are pregnant?\" \"When was your last menstrual period?\"      N/a    8. TRAVEL: \"Have you traveled out of the country in the last month?\" (e.g., travel history, exposures)      N/a    Protocols used: Muscle Aches and Body Pain-A-[place]    "

## 2024-06-12 NOTE — TELEPHONE ENCOUNTER
I cannot find a notation of a muscle relaxer in his chart, including on admission or discharge notes from [place].  Complex pain and multiple providers.  I don't know which med he might be referring to.

## 2024-06-12 NOTE — TELEPHONE ENCOUNTER
Please refer to triage encounter.    Gino Tipton RN  [facility]

## 2024-06-12 NOTE — TELEPHONE ENCOUNTER
Patient returns call to provide name of muscle relaxant medication. Medication name: Lioresal (baclofen), unknown milligram of medication.

## 2024-06-12 NOTE — TELEPHONE ENCOUNTER
Called patient and relayed message. Patient doesn't know name of medication, will check his records at home to see if he can find any info. He will call back if he's able to find any information.  Otherwise, patient is OK with waiting for PCP to return to clinic and address request. Pharmacy pended, appointment with PCP 6/14/24 at 10:00am.    Deepti Abernathy RN  [facility]

## 2024-06-14 ENCOUNTER — ALLIED HEALTH/NURSE VISIT (OUTPATIENT)
Dept: EDUCATION SERVICES | Facility: CLINIC | Age: 34
End: 2024-06-14
Payer: COMMERCIAL

## 2024-06-14 ENCOUNTER — PATIENT OUTREACH (OUTPATIENT)
Dept: CARE COORDINATION | Facility: CLINIC | Age: 34
End: 2024-06-14

## 2024-06-14 ENCOUNTER — OFFICE VISIT (OUTPATIENT)
Dept: FAMILY MEDICINE | Facility: CLINIC | Age: 34
End: 2024-06-14
Payer: COMMERCIAL

## 2024-06-14 ENCOUNTER — TELEPHONE (OUTPATIENT)
Dept: UROLOGY | Facility: CLINIC | Age: 34
End: 2024-06-14

## 2024-06-14 VITALS
OXYGEN SATURATION: 99 % | HEART RATE: 66 BPM | RESPIRATION RATE: 20 BRPM | TEMPERATURE: 97.6 F | SYSTOLIC BLOOD PRESSURE: 92 MMHG | DIASTOLIC BLOOD PRESSURE: 59 MMHG

## 2024-06-14 DIAGNOSIS — E66.01 MORBID OBESITY (H): ICD-10-CM

## 2024-06-14 DIAGNOSIS — E53.8 VITAMIN B12 DEFICIENCY (NON ANEMIC): ICD-10-CM

## 2024-06-14 DIAGNOSIS — Z59.82 TRANSPORTATION INSECURITY: ICD-10-CM

## 2024-06-14 DIAGNOSIS — R20.0 LOSS OF SENSATION: ICD-10-CM

## 2024-06-14 DIAGNOSIS — Z79.4 TYPE 2 DIABETES MELLITUS WITHOUT COMPLICATION, WITH LONG-TERM CURRENT USE OF INSULIN (H): Primary | ICD-10-CM

## 2024-06-14 DIAGNOSIS — E11.9 TYPE 2 DIABETES MELLITUS WITHOUT COMPLICATION, WITHOUT LONG-TERM CURRENT USE OF INSULIN (H): ICD-10-CM

## 2024-06-14 DIAGNOSIS — E55.9 VITAMIN D DEFICIENCY: ICD-10-CM

## 2024-06-14 DIAGNOSIS — Z51.81 ENCOUNTER FOR THERAPEUTIC DRUG MONITORING: ICD-10-CM

## 2024-06-14 DIAGNOSIS — E11.9 TYPE 2 DIABETES MELLITUS WITHOUT COMPLICATION, WITH LONG-TERM CURRENT USE OF INSULIN (H): Primary | ICD-10-CM

## 2024-06-14 DIAGNOSIS — D69.9 BLEEDS EASILY (H): ICD-10-CM

## 2024-06-14 DIAGNOSIS — G95.0 SYRINGOMYELIA (H): ICD-10-CM

## 2024-06-14 DIAGNOSIS — R41.89 COGNITIVE DEFICITS: ICD-10-CM

## 2024-06-14 DIAGNOSIS — N31.9 NEUROGENIC BLADDER: ICD-10-CM

## 2024-06-14 DIAGNOSIS — R25.2 SPASTICITY: Primary | ICD-10-CM

## 2024-06-14 DIAGNOSIS — G91.9 HYDROCEPHALUS WITH OPERATING SHUNT (H): ICD-10-CM

## 2024-06-14 DIAGNOSIS — G40.909 NONINTRACTABLE EPILEPSY WITHOUT STATUS EPILEPTICUS, UNSPECIFIED EPILEPSY TYPE (H): ICD-10-CM

## 2024-06-14 DIAGNOSIS — E78.5 HYPERLIPIDEMIA, UNSPECIFIED HYPERLIPIDEMIA TYPE: Chronic | ICD-10-CM

## 2024-06-14 DIAGNOSIS — N30.00 ACUTE CYSTITIS WITHOUT HEMATURIA: ICD-10-CM

## 2024-06-14 DIAGNOSIS — R79.89 ABNORMAL LFTS (LIVER FUNCTION TESTS): ICD-10-CM

## 2024-06-14 DIAGNOSIS — G47.9 SLEEP DISTURBANCES: ICD-10-CM

## 2024-06-14 DIAGNOSIS — R41.3 MEMORY PROBLEM: ICD-10-CM

## 2024-06-14 PROBLEM — R73.09 ELEVATED HEMOGLOBIN A1C: Status: ACTIVE | Noted: 2024-06-14

## 2024-06-14 LAB
ALBUMIN SERPL BCG-MCNC: 4.9 G/DL (ref 3.5–5.2)
ALBUMIN UR-MCNC: 30 MG/DL
ALP SERPL-CCNC: 88 U/L (ref 40–150)
ALT SERPL W P-5'-P-CCNC: 43 U/L (ref 0–70)
ANION GAP SERPL CALCULATED.3IONS-SCNC: 16 MMOL/L (ref 7–15)
APPEARANCE UR: ABNORMAL
APTT PPP: 31 SECONDS (ref 22–38)
AST SERPL W P-5'-P-CCNC: 29 U/L (ref 0–45)
B BURGDOR IGG+IGM SER QL: 0.05
BILIRUB SERPL-MCNC: 0.5 MG/DL
BILIRUB UR QL STRIP: ABNORMAL
BUN SERPL-MCNC: 25.5 MG/DL (ref 6–20)
CALCIUM SERPL-MCNC: 9.7 MG/DL (ref 8.6–10)
CHLORIDE SERPL-SCNC: 106 MMOL/L (ref 98–107)
COLOR UR AUTO: YELLOW
CREAT SERPL-MCNC: 0.74 MG/DL (ref 0.67–1.17)
DEPRECATED HCO3 PLAS-SCNC: 20 MMOL/L (ref 22–29)
EGFRCR SERPLBLD CKD-EPI 2021: >90 ML/MIN/1.73M2
ERYTHROCYTE [DISTWIDTH] IN BLOOD BY AUTOMATED COUNT: 14.5 % (ref 10–15)
GLUCOSE SERPL-MCNC: 98 MG/DL (ref 70–99)
GLUCOSE UR STRIP-MCNC: NEGATIVE MG/DL
HBA1C MFR BLD: 5.9 % (ref 0–5.6)
HCT VFR BLD AUTO: 43.4 % (ref 40–53)
HGB BLD-MCNC: 14.2 G/DL (ref 13.3–17.7)
HGB UR QL STRIP: NEGATIVE
INR PPP: 0.95 (ref 0.85–1.15)
KETONES UR STRIP-MCNC: 80 MG/DL
LDLC SERPL DIRECT ASSAY-MCNC: 66 MG/DL
LEUKOCYTE ESTERASE UR QL STRIP: ABNORMAL
MCH RBC QN AUTO: 27.2 PG (ref 26.5–33)
MCHC RBC AUTO-ENTMCNC: 32.7 G/DL (ref 31.5–36.5)
MCV RBC AUTO: 83 FL (ref 78–100)
NITRATE UR QL: NEGATIVE
PH UR STRIP: 5.5 [PH] (ref 5–8)
PLATELET # BLD AUTO: 360 10E3/UL (ref 150–450)
POTASSIUM SERPL-SCNC: 3.9 MMOL/L (ref 3.4–5.3)
PROT SERPL-MCNC: 8.3 G/DL (ref 6.4–8.3)
RBC # BLD AUTO: 5.23 10E6/UL (ref 4.4–5.9)
SODIUM SERPL-SCNC: 142 MMOL/L (ref 135–145)
SP GR UR STRIP: >=1.03 (ref 1–1.03)
T PALLIDUM AB SER QL: NONREACTIVE
T4 FREE SERPL-MCNC: 1.65 NG/DL (ref 0.9–1.7)
TSH SERPL DL<=0.005 MIU/L-ACNC: 3.8 UIU/ML (ref 0.3–4.2)
UROBILINOGEN UR STRIP-ACNC: 0.2 E.U./DL
VIT B12 SERPL-MCNC: 191 PG/ML (ref 232–1245)
VIT D+METAB SERPL-MCNC: 17 NG/ML (ref 20–50)
WBC # BLD AUTO: 11 10E3/UL (ref 4–11)

## 2024-06-14 PROCEDURE — 97802 MEDICAL NUTRITION INDIV IN: CPT | Performed by: DIETITIAN, REGISTERED

## 2024-06-14 PROCEDURE — 85027 COMPLETE CBC AUTOMATED: CPT | Performed by: FAMILY MEDICINE

## 2024-06-14 PROCEDURE — 84439 ASSAY OF FREE THYROXINE: CPT | Performed by: FAMILY MEDICINE

## 2024-06-14 PROCEDURE — G0009 ADMIN PNEUMOCOCCAL VACCINE: HCPCS | Performed by: FAMILY MEDICINE

## 2024-06-14 PROCEDURE — 85610 PROTHROMBIN TIME: CPT | Performed by: FAMILY MEDICINE

## 2024-06-14 PROCEDURE — 85245 CLOT FACTOR VIII VW RISTOCTN: CPT | Performed by: FAMILY MEDICINE

## 2024-06-14 PROCEDURE — 85730 THROMBOPLASTIN TIME PARTIAL: CPT | Performed by: FAMILY MEDICINE

## 2024-06-14 PROCEDURE — 85390 FIBRINOLYSINS SCREEN I&R: CPT | Performed by: PATHOLOGY

## 2024-06-14 PROCEDURE — 85240 CLOT FACTOR VIII AHG 1 STAGE: CPT | Performed by: FAMILY MEDICINE

## 2024-06-14 PROCEDURE — 99214 OFFICE O/P EST MOD 30 MIN: CPT | Mod: 25 | Performed by: FAMILY MEDICINE

## 2024-06-14 PROCEDURE — 80171 DRUG SCREEN QUANT GABAPENTIN: CPT | Mod: 90 | Performed by: FAMILY MEDICINE

## 2024-06-14 PROCEDURE — 82607 VITAMIN B-12: CPT | Performed by: FAMILY MEDICINE

## 2024-06-14 PROCEDURE — 86618 LYME DISEASE ANTIBODY: CPT | Performed by: FAMILY MEDICINE

## 2024-06-14 PROCEDURE — 99000 SPECIMEN HANDLING OFFICE-LAB: CPT | Performed by: FAMILY MEDICINE

## 2024-06-14 PROCEDURE — 83036 HEMOGLOBIN GLYCOSYLATED A1C: CPT | Performed by: FAMILY MEDICINE

## 2024-06-14 PROCEDURE — 83721 ASSAY OF BLOOD LIPOPROTEIN: CPT | Performed by: FAMILY MEDICINE

## 2024-06-14 PROCEDURE — 87088 URINE BACTERIA CULTURE: CPT | Performed by: FAMILY MEDICINE

## 2024-06-14 PROCEDURE — 87086 URINE CULTURE/COLONY COUNT: CPT | Performed by: FAMILY MEDICINE

## 2024-06-14 PROCEDURE — 81003 URINALYSIS AUTO W/O SCOPE: CPT | Performed by: FAMILY MEDICINE

## 2024-06-14 PROCEDURE — 82306 VITAMIN D 25 HYDROXY: CPT | Performed by: FAMILY MEDICINE

## 2024-06-14 PROCEDURE — 86376 MICROSOMAL ANTIBODY EACH: CPT | Performed by: FAMILY MEDICINE

## 2024-06-14 PROCEDURE — 85246 CLOT FACTOR VIII VW ANTIGEN: CPT | Performed by: FAMILY MEDICINE

## 2024-06-14 PROCEDURE — 87186 SC STD MICRODIL/AGAR DIL: CPT | Performed by: FAMILY MEDICINE

## 2024-06-14 PROCEDURE — 90677 PCV20 VACCINE IM: CPT | Performed by: FAMILY MEDICINE

## 2024-06-14 PROCEDURE — 80175 DRUG SCREEN QUAN LAMOTRIGINE: CPT | Mod: 90 | Performed by: FAMILY MEDICINE

## 2024-06-14 PROCEDURE — 86780 TREPONEMA PALLIDUM: CPT | Performed by: FAMILY MEDICINE

## 2024-06-14 PROCEDURE — 36415 COLL VENOUS BLD VENIPUNCTURE: CPT | Performed by: FAMILY MEDICINE

## 2024-06-14 PROCEDURE — 84443 ASSAY THYROID STIM HORMONE: CPT | Performed by: FAMILY MEDICINE

## 2024-06-14 PROCEDURE — 80053 COMPREHEN METABOLIC PANEL: CPT | Performed by: FAMILY MEDICINE

## 2024-06-14 RX ORDER — METFORMIN HCL 500 MG
500 TABLET, EXTENDED RELEASE 24 HR ORAL 2 TIMES DAILY WITH MEALS
Qty: 120 TABLET | Refills: 3 | Status: SHIPPED | OUTPATIENT
Start: 2024-06-14

## 2024-06-14 RX ORDER — POLYETHYLENE GLYCOL 3350 17 G/17G
17 POWDER, FOR SOLUTION ORAL DAILY
COMMUNITY
Start: 2024-04-05

## 2024-06-14 RX ORDER — ONDANSETRON 8 MG/1
8 TABLET, ORALLY DISINTEGRATING ORAL EVERY 8 HOURS PRN
COMMUNITY

## 2024-06-14 RX ORDER — AMOXICILLIN 250 MG
2 CAPSULE ORAL DAILY
COMMUNITY
Start: 2024-04-05 | End: 2024-07-04

## 2024-06-14 RX ORDER — BACLOFEN 10 MG/1
10 TABLET ORAL 3 TIMES DAILY
Qty: 100 TABLET | Refills: 0 | Status: SHIPPED | OUTPATIENT
Start: 2024-06-14 | End: 2024-07-23

## 2024-06-14 RX ORDER — LANCING DEVICE/LANCETS
KIT MISCELLANEOUS
Qty: 1 EACH | Refills: 0 | Status: SHIPPED | OUTPATIENT
Start: 2024-06-14

## 2024-06-14 RX ORDER — ECHINACEA PURPUREA EXTRACT 125 MG
1-2 TABLET ORAL
COMMUNITY

## 2024-06-14 RX ORDER — ROSUVASTATIN CALCIUM 20 MG/1
20 TABLET, COATED ORAL DAILY
COMMUNITY
Start: 2024-03-23 | End: 2024-06-24

## 2024-06-14 SDOH — ECONOMIC STABILITY - TRANSPORTATION SECURITY: TRANSPORTATION INSECURITY: Z59.82

## 2024-06-14 ASSESSMENT — PAIN SCALES - GENERAL: PAINLEVEL: EXTREME PAIN (8)

## 2024-06-14 NOTE — TELEPHONE ENCOUNTER
Spoke with pt to schedule a new urology appointment, he requested a call back in the afternoon. Will follow up then

## 2024-06-14 NOTE — PROGRESS NOTES
Office Visit  [facility]  Date of Service: Jun 14, 2024      Subjective   Pacheco David is a 33 year old male who presents for   Chief Complaint   Patient presents with    Hospital F/U     2021  Suddenly lost feeling in back and legs while on a trip in [place]  Couldn't get up from couch/chair. Muscles wouldn't listen to him.   Has gradually worsened since then.     2022  Onset of pain - felt a big shock down the back of both lets - hit hard and fast  Getting worse since then.    2024  Over the last couple of months:  Motor function getting worse - muscles so stiff, won't move when he wants.   Left foot is curling downward due to stiffness.   In a lot of pain, too - muscle tightness is making it so it's difficult to sleep  Also right side of neck  Got a burn on his left calf and couldn't feel it (insensate on left)  Saw neurosurg. Needed MRI. They said see Dr. Rojas (neurology 6/20), then see neurosurg.  Ended up being hospitalized at [facility] to get MRI. Had MRI and they are unchanged.    Bowel and bladder changes  Neurogenic bowel/bladder. Self caths at baseline  2 weeks ago - met obstruction about 7 inches in tried to force it, it was really painful for days  Just forcing urine out now. Can't get catheter in.  No dysuria or frequency    BM. Soft pudding consistency  BM less often, can't always go when needed  No incontinence. BM not hard.    Also feels like's he's had some cognitive changes - thinking isn't as clear, memory doesn't seem as good.  Notes he's been bruising more easily.    Because of impaired mobility, he's been needing a hospital bed. He has one but it's malfunctioning and inoperable. Needs rx for new one.    Objective   BP 92/59 (BP Location: Right arm, Patient Position: Sitting, Cuff Size: Adult Regular)   Pulse 66   Temp 97.6  F (36.4  C) (Temporal)   Resp 20   SpO2 99%  He reports that he has never smoked. He has never been exposed to tobacco smoke.  He has never used smokeless tobacco.    Gen: Alert, no apparent distress. Sitting in power chair.  Skin: has old burns on leg from where he burnt it without feeling it.   MSK: left foot is held in slight plantar flexion with toes curled slightly downward, able to passively extend.     Results for orders placed or performed in visit on 06/14/24   HEMOGLOBIN A1C     Status: Abnormal   Result Value Ref Range    Hemoglobin A1C 5.9 (H) 0.0 - 5.6 %    Narrative    Results confirmed by repeat test.    CBC with platelets     Status: Normal   Result Value Ref Range    WBC Count 11.0 4.0 - 11.0 10e3/uL    RBC Count 5.23 4.40 - 5.90 10e6/uL    Hemoglobin 14.2 13.3 - 17.7 g/dL    Hematocrit 43.4 40.0 - 53.0 %    MCV 83 78 - 100 fL    MCH 27.2 26.5 - 33.0 pg    MCHC 32.7 31.5 - 36.5 g/dL    RDW 14.5 10.0 - 15.0 %    Platelet Count 360 150 - 450 10e3/uL   Partial thromboplastin time     Status: Normal   Result Value Ref Range    aPTT 31 22 - 38 Seconds   INR     Status: Normal   Result Value Ref Range    INR 0.95 0.85 - 1.15   Lyme Disease Total Abs Bld with Reflex to Confirm CLIA     Status: Normal   Result Value Ref Range    Lyme Disease Antibodies Total 0.05 <0.90   Vitamin B12     Status: Abnormal   Result Value Ref Range    Vitamin B12 191 (L) 232 - 1,245 pg/mL   Gabapentin Level     Status: None   Result Value Ref Range    Gabapentin Level 16.5 2.0 - 20.0 ug/mL   Lamotrigine Level     Status: Abnormal   Result Value Ref Range    Lamotrigine <0.9 (L) 3.0 - 15.0 ug/mL   LDL cholesterol direct     Status: Normal   Result Value Ref Range    LDL Cholesterol Direct 66 <100 mg/dL   TSH     Status: Normal   Result Value Ref Range    TSH 3.80 0.30 - 4.20 uIU/mL   Thyroid peroxidase antibody     Status: Normal   Result Value Ref Range    Thyroid Peroxidase Antibody <10 <35 IU/mL   T4, free     Status: Normal   Result Value Ref Range    Free T4 1.65 0.90 - 1.70 ng/dL   Treponema Abs w Reflex to RPR and Titer     Status: Normal    Result Value Ref Range    Treponema Antibody Total Nonreactive Nonreactive   Vitamin D deficiency screening     Status: Abnormal   Result Value Ref Range    Vitamin D, Total (25-Hydroxy) 17 (L) 20 - 50 ng/mL    Narrative    Season, race, dietary intake, and treatment affect the concentration of 25-hydroxy-Vitamin D. Values may decrease during winter months and increase during summer months.    Vitamin D determination is routinely performed by an immunoassay specific for 25 hydroxyvitamin D3.  If an individual is on vitamin D2(ergocalciferol) supplementation, please specify 25 OH vitamin D2 and D3 level determination by LCMSMS test [id number].     Comprehensive metabolic panel (BMP + Alb, Alk Phos, ALT, AST, Total. Bili, TP)     Status: Abnormal   Result Value Ref Range    Sodium 142 135 - 145 mmol/L    Potassium 3.9 3.4 - 5.3 mmol/L    Carbon Dioxide (CO2) 20 (L) 22 - 29 mmol/L    Anion Gap 16 (H) 7 - 15 mmol/L    Urea Nitrogen 25.5 (H) 6.0 - 20.0 mg/dL    Creatinine 0.74 0.67 - 1.17 mg/dL    GFR Estimate >90 >60 mL/min/1.73m2    Calcium 9.7 8.6 - 10.0 mg/dL    Chloride 106 98 - 107 mmol/L    Glucose 98 70 - 99 mg/dL    Alkaline Phosphatase 88 40 - 150 U/L    AST 29 0 - 45 U/L    ALT 43 0 - 70 U/L    Protein Total 8.3 6.4 - 8.3 g/dL    Albumin 4.9 3.5 - 5.2 g/dL    Bilirubin Total 0.5 <=1.2 mg/dL   UA with Microscopic - lab collect     Status: Abnormal   Result Value Ref Range    Color Urine Yellow Colorless, Straw, Light Yellow, Yellow    Appearance Urine Cloudy (A) Clear    Glucose Urine Negative Negative mg/dL    Bilirubin Urine Small (A) Negative    Ketones Urine 80 (A) Negative mg/dL    Specific Gravity Urine >=1.030 1.005 - 1.030    Blood Urine Negative Negative    pH Urine 5.5 5.0 - 8.0    Protein Albumin Urine 30 (A) Negative mg/dL    Urobilinogen Urine 0.2 0.2, 1.0 E.U./dL    Nitrite Urine Negative Negative    Leukocyte Esterase Urine Trace (A) Negative   Urine Culture     Status: Abnormal    Specimen: Urine,  Midstream   Result Value Ref Range    Culture >100,000 CFU/mL Enterococcus faecalis (A)        Susceptibility    Enterococcus faecalis - CAITLIN     Ampicillin <=2 Susceptible ug/mL     Vancomycin 1 Susceptible ug/mL     Nitrofurantoin <=16 Susceptible ug/mL     Assessment & Plan     Pacheco is a 32 yo man with chronic syringomyelia, 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 Now to the point where this previously ambulatory patient is in a wheelchair and he has become insensate to the point where he didn't feel a burn on the skin on his leg. He has noted subacute changes with more spasticity and some mental status changes over the last couple of months and just over the last two weeks an inability to self catheterize due to obstruction.    Assessment:  Neurogenic bladder with urinary obstruction, currently unable to self catheterize.  Urinary tract infection complicated by #1.  Cognitive changes, likely worsened by UTI.  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  Vitamin B12 deficiency which may be contributing to his neurologic symptoms.  Vitamin D deficiency.  Undetectable lamotrigine level.   Reported history of easy bruising - eval in progress  Transportation insecurity - has been keeping him from appointments  Diabetes now well-controlled (A1c 5.9%).    Plan:  Nitrofurantoin twice daily for a week - further antibiotic therapy to be determined by urology  Urology appointment 6/18/24 to assess obstructive symptoms + neurogenic bladder. Inability to self catheterize and current UTI.  Monthly vitamin B12 injections.  Start weekly vitamin D 58227 units oral.  Will clarify lamotrigine intake.  Hematologic evaluation of easy bruising in progress  New hospital bed order placed - needed  due to weakness, spasticity and loss of sensation  Clinic care coordination working with Pacheco on medical transportation.  Pneumococcal immunization given  Return to clinic in August for recheck with me    Order Summary                                                      Spasticity  -     baclofen (LIORESAL) 10 MG tablet; Take 1 tablet (10 mg) by mouth 3 times daily    Cognitive deficits    Neurogenic bladder  -     Adult Urology  Referral; Future    Type 2 diabetes mellitus without complication, without long-term current use of insulin (H)  -     HEMOGLOBIN A1C; Future  -     HEMOGLOBIN A1C  -     blood glucose (ACCU-CHEK SOFTCLIX) lancing device; Lancing device to be used with lancets.    Memory problem  -     Lyme Disease Total Abs Bld with Reflex to Confirm CLIA; Future  -     Vitamin B12; Future  -     TSH; Future  -     Thyroid peroxidase antibody; Future  -     T4, free; Future  -     Treponema Abs w Reflex to RPR and Titer; Future  -     UA with Microscopic - lab collect; Future  -     Urine Culture; Future  -     Lyme Disease Total Abs Bld with Reflex to Confirm CLIA  -     Vitamin B12  -     TSH  -     Thyroid peroxidase antibody  -     T4, free  -     Treponema Abs w Reflex to RPR and Titer  -     UA with Microscopic - lab collect  -     Urine Culture    Sleep disturbances    Encounter for therapeutic drug monitoring  -     [id number] - Urine Drug Confirmation Panel (Comprehensive); Future  -     Gabapentin Level; Future  -     Lamotrigine Level; Future  -     Gabapentin Level  -     Lamotrigine Level    Bleeds easily (H24)  -     CBC with platelets; Future  -     Partial thromboplastin time; Future  -     INR; Future  -     Factor 8 assay; Future  -     Von Willebrand antigen; Future  -     von Willebrand Factor Activity; Future  -     von Willebrand Interpretation; Future  -     CBC with platelets  -     Partial thromboplastin time  -     INR  -     Factor 8 assay  -     Von Willebrand antigen  -      von Willebrand Factor Activity  -     von Willebrand Interpretation    Syringomyelia (H)    Hydrocephalus with operating shunt (H)    Nonintractable epilepsy without status epilepticus, unspecified epilepsy type (H)    Morbid obesity (H)    Transportation insecurity  -     Primary Care - Care Coordination Referral; Future    Hyperlipidemia, unspecified hyperlipidemia type  -     LDL cholesterol direct; Future  -     LDL cholesterol direct    Vitamin D deficiency  -     Vitamin D deficiency screening; Future  -     Vitamin D deficiency screening  -     vitamin D2 (ERGOCALCIFEROL) 16980 units (1250 mcg) capsule; Take 1 capsule (50,000 Units) by mouth once a week  -     Vitamin D deficiency screening; Standing    Abnormal LFTs (liver function tests)  -     Comprehensive metabolic panel (BMP + Alb, Alk Phos, ALT, AST, Total. Bili, TP); Future  -     Comprehensive metabolic panel (BMP + Alb, Alk Phos, ALT, AST, Total. Bili, TP)    Vitamin B12 deficiency (non anemic)  -     cyanocobalamin injection 1,000 mcg    Other orders  -     senna-docusate (SENOKOT-S/PERICOLACE) 8.6-50 MG tablet; Take 2 tablets by mouth daily  -     sodium chloride 0.65 % nasal spray; Spray 1-2 sprays in nostril  -     rosuvastatin (CRESTOR) 20 MG tablet; Take 20 mg by mouth daily  -     polyethylene glycol (MIRALAX) 17 GM/Dose powder; Take 17 g by mouth daily  -     ondansetron (ZOFRAN ODT) 8 MG ODT tab; Take 8 mg by mouth every 8 hours as needed for nausea  -     Pneumococcal 20 Valent Conjugate (Prevnar 20)        Immunizations Administered       Name Date Dose VIS Date Route    Pneumococcal 20 valent Conjugate (Prevnar 20) 6/14/24 10:43 AM 0.5 mL 05/12/2023, Given Today Intramuscular            Future Appointments   Date Time Provider Department Center   6/18/2024  8:30 AM Estelita Henriquez PA-C [facility]   [facility]18[facility]2024  [facility]   6/21/2024  2:30 PM Breezy Pathak RPH [facility]   6/28/2024  2:40 PM Romaine Bowser  ZOE Martinez [facility]   8/29/2024  2:20 PM Lary Nicolas MD [facility]          Completed by: Lary Nicolas M.D., [facility]. 6/14/2024 10:11 AM.  This transcription uses voice recognition software, which may contain typographical errors.  MDM: SDOH neighborhood: [facility] [address], language: English, .    Prior to immunization administration, verified patients identity using patient s name and date of birth. Please see Immunization Activity for additional information.     Screening Questionnaire for Adult Immunization    Are you sick today?   No   Do you have allergies to medications, food, a vaccine component or latex?   Yes   Have you ever had a serious reaction after receiving a vaccination?   No   Do you have a long-term health problem with heart, lung, kidney, or metabolic disease (e.g., diabetes), asthma, a blood disorder, no spleen, complement component deficiency, a cochlear implant, or a spinal fluid leak?  Are you on long-term aspirin therapy?   Yes   Do you have cancer, leukemia, HIV/AIDS, or any other immune system problem?   No   Do you have a parent, brother, or sister with an immune system problem?   No   In the past 3 months, have you taken medications that affect  your immune system, such as prednisone, other steroids, or anticancer drugs; drugs for the treatment of rheumatoid arthritis, Crohn s disease, or psoriasis; or have you had radiation treatments?   No   Have you had a seizure, or a brain or other nervous system problem?   Yes   During the past year, have you received a transfusion of blood or blood    products, or been given immune (gamma) globulin or antiviral drug?   No   For women: Are you pregnant or is there a chance you could become       pregnant during the next month?   No   Have you received any vaccinations in the past 4 weeks?   No     Immunization questionnaire was positive for at least one answer.  Notified .      Patient instructed to  "remain in clinic for 15 minutes afterwards, and to report any adverse reactions.     Screening performed by Carly Osullivan MA on 6/14/2024 at 10:03 AM.DME (Durable Medical Equipment) Orders and Documentation  Orders Placed This Encounter   Procedures    Hospital Bed Order        The patient was assessed and it was determined the patient is in need of the following listed DME Supplies/Equipment. Please complete supporting documentation below to demonstrate medical necessity.      DME (Durable Medical Equipment) Orders and Documentation  Orders Placed This Encounter   Procedures    Hospital Bed Order      The patient was assessed and it was determined the patient is in need of the following listed DME Supplies/Equipment. Please complete supporting documentation below to demonstrate medical necessity.      Hospital Bed/Accessories Documentation  Hospital bed is required for body positioning, to allow for safe transfers to wheelchair and standing and frequent changes in body position, not feasible in an ordinary bed     NOTE: Patient must have a \"Yes\" in one of the four following questions to qualify for a hospital bed.    1. Does the patient require positioning of the body in ways not feasible with an ordinary bed due to a medical condition that is expected to last at least 1 month? Yes (Please explain): weakness, loss of sensation in lower extremities, and pain.    2. Does the patient require, for the alleviation of pain, positioning of the body in ways not feasible with an ordinary bed? Yes (Please explain): weakness, loss of sensation in lower extremities, and pain.      3. Does the patient require the head of the bed to be elevated more than 30 degrees most of the time due to congestive heart failure, chronic pulmonary disease, or aspiration? No    4. Does the patient require traction that can only be attached to a hospital bed? No    Additional Criteria:    Does the patient require frequent changes in body " position and/or have an immediate need for change in body position? Yes - Patient qualifies for Semi Electric Bed     Trapeze Criteria:  (Patient must meet standard hospital bed criteria also)   1. Does patient need this device to sit up because of a respiratory condition, for change in body position for other medical reasons, or to get in or out of bed? Yes (Please explain): needs help sitting up due to weakness and for getting out of bed due to weakness.

## 2024-06-14 NOTE — PROGRESS NOTES
"Clinic Care Coordination Contact:  Community Health Worker Initial Outreach    Reason: CCC CHW Initial Outreach Called- Referral to Clinic Care Coordination (CCC) Service  Referral provider/name: Lary Nicolas MD   Note/comments attached per referral reviewed;   \"Note:  Additional Information:  from PT note 5/20 \"Per patient, unable to afford gas/ transportation to attend medical appointments thus placed hold on PT/OT at this time. Recommend discussing with case workers for medical transportation rides.\"    CHW Initial Information Gathering:  Referral Source: PCP  Preferred Urgent Care: [facility], [phone]  Current living arrangement:: I live in a private home with family  Informal Support system:: Family (PCA's and father per pt shared on 6/14/2024.)  Transportation means:: Family, Regular car, Other (Father and 2 pca's per pt shared on 6/14/2024.)     Care Mgmt (GEN) screening:   -Employment status? Unemployed.   -Functional Status. Dependent ADLs? Wheelchair-with assist.   -Mobility Status? Independent w/Device. Note: Wheelchair per pt on 6/14/2024 via face-to-face.   -Fallen 2 or more times in the past year? No, per pt.   -Any fall with injury in the past year? No, per pt.     Patient accepts CC: Yes. Patient scheduled for assessment with [facility]  on 6/18/2024 at 3:00PM via phone visit. Patient noted desire to discuss transportation resources help per pt. Note/FYI: Patient is aware [facility] SW will call him via phone. Pt confirmed.     Potential Patient Outreach Discussion:   Attempted #1: The CHW met with the patient in clinic today at the request of the PCP to discuss possible clinic care coordination enrollment. Have introduced myself to patient. Clinic care coordination services was described to the patient and immediate needs were discussed. Patient is aware [facility] team includes CHW, SW, RN, and FRW. The patient agreed enrollment into CCC service.     CHW explained " "initial assessment, [facility] monthly outreach follow up and CCC outreach standard work. CHW completed the CHW screening and Care Mgmt (GEN) above with patient. CHW assisted with scheduling patient initial assessment appt with [facility] clinician (CHIO or RN). CHW contact info given to the patient.     Patient shared:   -My father bring me to today's appt. Father may not be available every time. Need help with transportation service resources.   -Currently active with PCA service; get 8 hours a day for everyday. Have two pca's- sister, Xai and brother, \"Rizal.\"   -Live with family. Family assist needs and coordinates care.   -Doing good. No other questions or concerns at this time.     Note/comment:   Per today's face to face meet and greet visit; patient came in a power wheelchair.   CHW coordinated with  to arrived his appt with BENNY Garcia Ed and bring the pt BENNY Ed office per BENNY Ed nurse agreed.     Plan:   Patient attend the patient initial assessment appt schedule date below:   Name: Pacheco David MRN: [id number]     Date: 6/18/2024 Status: Scheduled     Time: 3:00 PM Length: 60     Visit Type: CCC PHONE VISIT [1068] Copay: $0.00     Provider: [facility] CHIO Department: [facility] NURSE       Notes: Initial assessment- need transportation resources per pt (FYI: enroll with CHW Nancy)     "

## 2024-06-14 NOTE — LETTER
6/14/2024         RE: Pacheco David  [address] [facility] [address]        Dear Colleague,    Thank you for referring your patient, Pacheco David, to the [facility]. Please see a copy of my visit note below.    Diabetes Self-Management Education & Support/ 20 minutes    Presents for: Follow-up    Type of Service: In Person Visit      ASSESSMENT:  Pacheco is currently taking Metformin XR 1000 mg and Ozempic 1 mg. He is checking his BG at home 2-3x/week, all BG are meeting glycemic goals. He is working with physical therapy once weekly, no other planned exercise due to physical limitation. He had a stuart pro placed at our last visit on 5/28, but since that time he was in hospital and they removed the patch and threw it away. Pacheco continues with his healthy nutritional changes: water only, small portions of carbohydrate containing foods, brown rice, whole wheat bread, more vegetables and fruit as a snack. S/S of hyperglycemia have lessened. He continues to feel fatigued due to poor sleep due to nerve pain. 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 Pacheco has started to brush his teeth twice daily and floss, he has a call in to schedule a dental exam. He has noticed a decreased in his appetite and clothes are fitting more loosely.  A1C redraw completed today with significant improvement to 5.9% from 9.5% on 3/27/24.     Patient's most recent   Lab Results   Component Value Date    A1C 5.9 06/14/2024     is meeting goal of <7.0    Diabetes knowledge and skills assessment:   Patient is knowledgeable in diabetes management concepts related to: Healthy Eating, Monitoring, Taking Medication, and Reducing Risks    Continue education with the following diabetes management concepts: per patient need or request    Based on learning assessment above, most appropriate setting for further diabetes education would be: Individual  "setting.      PLAN  Check blood sugar once daily: either in the morning before eating or two hours after a meal.  Continue to brush teeth twice daily and flossing once daily.  Continue metformin XR 1000 mg and Ozempic 1mg .  Limit carbohydrate containing foods to no more than 1/4 of your plate, add in non-starchy vegetables and lean protein.  Call Diabetes team for questions or concerns: [phone]    Topics to cover at upcoming visits: per patient need or request    Follow-up: prn    See Care Plan for co-developed, patient-state behavior change goals.  AVS provided for patient today.    Education Materials Provided:  No new materials provided today      SUBJECTIVE/OBJECTIVE:  Presents for: Follow-up  Accompanied by: Self  Diabetes education in the past 24mo: Yes  Focus of Visit: Monitoring, Reducing Risks, Healthy Eating, Being Active  Diabetes type: Type 2  Date of diagnosis: 2023  Disease course: Improving  Transportation concerns: Yes  Other concerns:: None  Cultural Influences/Ethnic Background:  Not  or       Diabetes Symptoms & Complications:  Diabetes Related Symptoms: Neuropathy, Visual change, Fatigue  Weight trend: Decreasing  Symptom course: Improving  Disease course: Improving       Patient Problem List and Family Medical History reviewed for relevant medical history, current medical status, and diabetes risk factors.    Vitals:  There were no vitals taken for this visit.  Estimated body mass index is 44.39 kg/m  as calculated from the following:    Height as of 3/27/24: 1.676 m (5' 6\").    Weight as of 4/16/24: 124.7 kg (275 lb).   Last 3 BP:   BP Readings from Last 3 Encounters:   06/14/24 92/59   03/27/24 131/87   03/16/24 117/62       History   Smoking Status     Never   Smokeless Tobacco     Never       Labs:  Lab Results   Component Value Date    A1C 5.9 06/14/2024     Lab Results   Component Value Date     03/27/2024     10/06/2021     Lab Results   Component Value " "Date    LDL 64 03/27/2024     Direct Measure HDL   Date Value Ref Range Status   03/27/2024 39 (L) >=40 mg/dL Final   ]  GFR Estimate   Date Value Ref Range Status   03/27/2024 >90 >60 mL/min/1.73m2 Final   08/05/2020 >60 >60 mL/min/1.73m2 Final     GFR, ESTIMATED POCT   Date Value Ref Range Status   03/06/2024 >60 >60 mL/min/1.73m2 Final     GFR Estimate If Black   Date Value Ref Range Status   08/05/2020 >60 >60 mL/min/1.73m2 Final     Lab Results   Component Value Date    CR 0.67 03/27/2024     No results found for: \"MICROALBUMIN\"    Healthy Eating:  Healthy Eating Assessed Today: Yes  Meal planning/habits: None  Meals include: Lunch, Dinner  Lunch: salad/boiled eggs(2), some croutons/ranch, water or green tea  since march 23rd  Dinner: whole wheat bread, 2 or brown rice one cup with vegetables and protein  Snacks: afternoon: fruit  Other: before he was having cereal with meals, rice/pasta, used to have sweets and sugary beverages,  Beverages: Water, Tea (used to have 2 sodas or energy drinks/day)  Has patient met with a dietitian in the past?: Yes    Being Active:  Being Active Assessed Today: Yes  Exercise:: Currently not exercising (patient is not able to exercise as this time, he is using an electric wheelchair and is not able to walk at this time.)  Barrier to exercise: Physical limitation    Monitoring:  Monitoring Assessed Today: Yes (patient did not  testing supplies, he said he was nervous to start testing his BG and is a visual learner so learns best watching. He was instructed on an accu check meter today with FBS=95 mg/dl)  Did patient bring glucose meter to appointment? : Yes  Blood Glucose Meter: Accu-chek  Times checking blood sugar at home (number): 3  Times checking blood sugar at home (per): Week    FBS: 95,119,97,90,97  PP: 85,160,105    Taking Medications:  Diabetes Medication(s)       Biguanides       metFORMIN (GLUCOPHAGE XR) 500 MG 24 hr tablet Start taking 1000 mg (2 tablets) by " mouth with dinner for 1 week, then increase to 1500 mg (3 tablets) for 1 week and then take 2000 mg (4 tabelts) thereafter       Incretin Mimetic Agents       semaglutide (OZEMPIC) 2 MG/3ML pen Inject 0.5 mg Subcutaneous every 7 days for 28 days     Patient not taking: Reported on 6/14/2024     Semaglutide, 1 MG/DOSE, (OZEMPIC) 4 MG/3ML pen Inject 1 mg Subcutaneous every 7 days            Taking Medication Assessed Today: Yes  Current Treatments: Diet, Non-insulin Injectables, Oral Medication (taken by mouth)  Problems taking diabetes medications regularly?: No  Diabetes medication side effects?: Yes (diarrhea with > 1000 mg of metformin)    Problem Solving:  Problem Solving Assessed Today: Yes              Reducing Risks:  Reducing Risks Assessed Today: Yes  Diabetes Risks: Sedentary Lifestyle, Ethnicity  CAD Risks: Diabetes Mellitus, Male sex, Sedentary lifestyle  Has dilated eye exam at least once a year?: Yes  Sees dentist every 6 months?: No    Healthy Coping:  Healthy Coping Assessed Today: Yes  Emotional response to diabetes: Ready to learn  Informal Support system:: Other  Stage of change: ACTION (Actively working towards change)  Patient Activation Measure Survey Score:       No data to display                  Care Plan and Education Provided:  Healthy Eating: variety in meals, adding in small amounts of CHO, Being Active: activity per PT, Monitoring: Frequency of monitoring and Individual glucose targets, Taking Medication: When to take medication(s), and Reducing Risks: Dental care, Eye care, and Goal for A1c, how it relates to glucose and how often to check        Time Spent: 20 minutes  Encounter Type: Individual    Any diabetes medication dose changes were made via the CDE Protocol per the patient's primary care provider. A copy of this encounter was shared with the provider.

## 2024-06-14 NOTE — PROGRESS NOTES
Diabetes Self-Management Education & Support/ 20 minutes    Presents for: Follow-up    Type of Service: In Person Visit      ASSESSMENT:  Pacheco is currently taking Metformin XR 1000 mg and Ozempic 1 mg. He is checking his BG at home 2-3x/week, all BG are meeting glycemic goals. He is working with physical therapy once weekly, no other planned exercise due to physical limitation. He had a stuart pro placed at our last visit on 5/28, but since that time he was in hospital and they removed the patch and threw it away. Pacheco continues with his healthy nutritional changes: water only, small portions of carbohydrate containing foods, brown rice, whole wheat bread, more vegetables and fruit as a snack. S/S of hyperglycemia have lessened. He continues to feel fatigued due to poor sleep due to nerve pain. 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 Pacheco has started to brush his teeth twice daily and floss, he has a call in to schedule a dental exam. He has noticed a decreased in his appetite and clothes are fitting more loosely.  A1C redraw completed today with significant improvement to 5.9% from 9.5% on 3/27/24.     Patient's most recent   Lab Results   Component Value Date    A1C 5.9 06/14/2024     is meeting goal of <7.0    Diabetes knowledge and skills assessment:   Patient is knowledgeable in diabetes management concepts related to: Healthy Eating, Monitoring, Taking Medication, and Reducing Risks    Continue education with the following diabetes management concepts: per patient need or request    Based on learning assessment above, most appropriate setting for further diabetes education would be: Individual setting.      PLAN  Check blood sugar once daily: either in the morning before eating or two hours after a meal.  Continue to brush teeth twice daily and flossing once daily.  Continue metformin XR 1000 mg and Ozempic 1mg .  Limit carbohydrate  "containing foods to no more than 1/4 of your plate, add in non-starchy vegetables and lean protein.  Call Diabetes team for questions or concerns: [phone]    Topics to cover at upcoming visits: per patient need or request    Follow-up: prn    See Care Plan for co-developed, patient-state behavior change goals.  AVS provided for patient today.    Education Materials Provided:  No new materials provided today      SUBJECTIVE/OBJECTIVE:  Presents for: Follow-up  Accompanied by: Self  Diabetes education in the past 24mo: Yes  Focus of Visit: Monitoring, Reducing Risks, Healthy Eating, Being Active  Diabetes type: Type 2  Date of diagnosis: 2023  Disease course: Improving  Transportation concerns: Yes  Other concerns:: None  Cultural Influences/Ethnic Background:  Not  or       Diabetes Symptoms & Complications:  Diabetes Related Symptoms: Neuropathy, Visual change, Fatigue  Weight trend: Decreasing  Symptom course: Improving  Disease course: Improving       Patient Problem List and Family Medical History reviewed for relevant medical history, current medical status, and diabetes risk factors.    Vitals:  There were no vitals taken for this visit.  Estimated body mass index is 44.39 kg/m  as calculated from the following:    Height as of 3/27/24: 1.676 m (5' 6\").    Weight as of 4/16/24: 124.7 kg (275 lb).   Last 3 BP:   BP Readings from Last 3 Encounters:   06/14/24 92/59   03/27/24 131/87   03/16/24 117/62       History   Smoking Status    Never   Smokeless Tobacco    Never       Labs:  Lab Results   Component Value Date    A1C 5.9 06/14/2024     Lab Results   Component Value Date     03/27/2024     10/06/2021     Lab Results   Component Value Date    LDL 64 03/27/2024     Direct Measure HDL   Date Value Ref Range Status   03/27/2024 39 (L) >=40 mg/dL Final   ]  GFR Estimate   Date Value Ref Range Status   03/27/2024 >90 >60 mL/min/1.73m2 Final   08/05/2020 >60 >60 mL/min/1.73m2 Final " "    GFR, ESTIMATED POCT   Date Value Ref Range Status   03/06/2024 >60 >60 mL/min/1.73m2 Final     GFR Estimate If Black   Date Value Ref Range Status   08/05/2020 >60 >60 mL/min/1.73m2 Final     Lab Results   Component Value Date    CR 0.67 03/27/2024     No results found for: \"MICROALBUMIN\"    Healthy Eating:  Healthy Eating Assessed Today: Yes  Meal planning/habits: None  Meals include: Lunch, Dinner  Lunch: salad/boiled eggs(2), some croutons/ranch, water or green tea  since march 23rd  Dinner: whole wheat bread, 2 or brown rice one cup with vegetables and protein  Snacks: afternoon: fruit  Other: before he was having cereal with meals, rice/pasta, used to have sweets and sugary beverages,  Beverages: Water, Tea (used to have 2 sodas or energy drinks/day)  Has patient met with a dietitian in the past?: Yes    Being Active:  Being Active Assessed Today: Yes  Exercise:: Currently not exercising (patient is not able to exercise as this time, he is using an electric wheelchair and is not able to walk at this time.)  Barrier to exercise: Physical limitation    Monitoring:  Monitoring Assessed Today: Yes (patient did not  testing supplies, he said he was nervous to start testing his BG and is a visual learner so learns best watching. He was instructed on an accu check meter today with FBS=95 mg/dl)  Did patient bring glucose meter to appointment? : Yes  Blood Glucose Meter: Accu-chek  Times checking blood sugar at home (number): 3  Times checking blood sugar at home (per): Week    FBS: 95,119,97,90,97  PP: 85,160,105    Taking Medications:  Diabetes Medication(s)       Biguanides       metFORMIN (GLUCOPHAGE XR) 500 MG 24 hr tablet Start taking 1000 mg (2 tablets) by mouth with dinner for 1 week, then increase to 1500 mg (3 tablets) for 1 week and then take 2000 mg (4 tabelts) thereafter       Incretin Mimetic Agents       semaglutide (OZEMPIC) 2 MG/3ML pen Inject 0.5 mg Subcutaneous every 7 days for 28 days     " Patient not taking: Reported on 6/14/2024     Semaglutide, 1 MG/DOSE, (OZEMPIC) 4 MG/3ML pen Inject 1 mg Subcutaneous every 7 days            Taking Medication Assessed Today: Yes  Current Treatments: Diet, Non-insulin Injectables, Oral Medication (taken by mouth)  Problems taking diabetes medications regularly?: No  Diabetes medication side effects?: Yes (diarrhea with > 1000 mg of metformin)    Problem Solving:  Problem Solving Assessed Today: Yes              Reducing Risks:  Reducing Risks Assessed Today: Yes  Diabetes Risks: Sedentary Lifestyle, Ethnicity  CAD Risks: Diabetes Mellitus, Male sex, Sedentary lifestyle  Has dilated eye exam at least once a year?: Yes  Sees dentist every 6 months?: No    Healthy Coping:  Healthy Coping Assessed Today: Yes  Emotional response to diabetes: Ready to learn  Informal Support system:: Other  Stage of change: ACTION (Actively working towards change)  Patient Activation Measure Survey Score:       No data to display                  Care Plan and Education Provided:  Healthy Eating: variety in meals, adding in small amounts of CHO, Being Active: activity per PT, Monitoring: Frequency of monitoring and Individual glucose targets, Taking Medication: When to take medication(s), and Reducing Risks: Dental care, Eye care, and Goal for A1c, how it relates to glucose and how often to check        Time Spent: 20 minutes  Encounter Type: Individual    Any diabetes medication dose changes were made via the CDE Protocol per the patient's primary care provider. A copy of this encounter was shared with the provider.

## 2024-06-14 NOTE — TELEPHONE ENCOUNTER
Patient confirmed scheduled appointment:  Date: June 18th  Time: 8:30am   Visit type: New  Provider: Estelita Henriquez   [facility] [facility]  Testing/imaging: N/A  Additional notes: Per referral / staff message

## 2024-06-14 NOTE — PATIENT INSTRUCTIONS
Check blood sugar once daily: either in the morning before eating or two hours after a meal.  Continue to brush teeth twice daily and flossing once daily.  Continue metformin XR 1000 mg and Ozempic 1mg .  Limit carbohydrate containing foods to no more than 1/4 of your plate, add in non-starchy vegetables and lean protein.  Call Diabetes team for questions or concerns: [phone]

## 2024-06-15 LAB — GABAPENTIN SERPLBLD-MCNC: 16.5 UG/ML

## 2024-06-16 LAB
BACTERIA UR CULT: ABNORMAL
LAMOTRIGINE SERPL-MCNC: <0.9 UG/ML

## 2024-06-17 DIAGNOSIS — N39.0 URINARY TRACT INFECTION ASSOCIATED WITH CATHETERIZATION OF URINARY TRACT, UNSPECIFIED INDWELLING URINARY CATHETER TYPE, INITIAL ENCOUNTER (H): Primary | ICD-10-CM

## 2024-06-17 DIAGNOSIS — T83.511A URINARY TRACT INFECTION ASSOCIATED WITH CATHETERIZATION OF URINARY TRACT, UNSPECIFIED INDWELLING URINARY CATHETER TYPE, INITIAL ENCOUNTER (H): Primary | ICD-10-CM

## 2024-06-17 PROBLEM — R79.89 ABNORMAL LFTS (LIVER FUNCTION TESTS): Status: ACTIVE | Noted: 2024-06-17

## 2024-06-17 PROBLEM — E53.8 VITAMIN B12 DEFICIENCY (NON ANEMIC): Status: ACTIVE | Noted: 2024-06-17

## 2024-06-17 PROBLEM — R25.2 SPASTICITY: Status: ACTIVE | Noted: 2024-06-17

## 2024-06-17 PROBLEM — Z59.82 TRANSPORTATION INSECURITY: Status: ACTIVE | Noted: 2024-06-17

## 2024-06-17 LAB — THYROPEROXIDASE AB SERPL-ACNC: <10 IU/ML

## 2024-06-17 RX ORDER — NITROFURANTOIN 25; 75 MG/1; MG/1
100 CAPSULE ORAL 2 TIMES DAILY
Qty: 14 CAPSULE | Refills: 0 | Status: SHIPPED | OUTPATIENT
Start: 2024-06-17 | End: 2024-06-18

## 2024-06-17 RX ORDER — CYANOCOBALAMIN 1000 UG/ML
1000 INJECTION, SOLUTION INTRAMUSCULAR; SUBCUTANEOUS
Status: ACTIVE | OUTPATIENT
Start: 2024-06-17 | End: 2025-06-12

## 2024-06-17 RX ORDER — ERGOCALCIFEROL 1.25 MG/1
50000 CAPSULE, LIQUID FILLED ORAL WEEKLY
Qty: 12 CAPSULE | Refills: 5 | Status: SHIPPED | OUTPATIENT
Start: 2024-06-17 | End: 2024-06-18

## 2024-06-17 NOTE — RESULT ENCOUNTER NOTE
Team - please call patient with results and send result letter with this information if patient desires for their records. Refer to MyChart result note as needed.      Pt was seen by Dr Nicolas last week- Urine is growing bacteria.    I recommend treating with macrobid bid for 7 days- rx sent in today     Dr Nicolas will address his other labs later this week

## 2024-06-18 ENCOUNTER — VIRTUAL VISIT (OUTPATIENT)
Dept: UROLOGY | Facility: CLINIC | Age: 34
End: 2024-06-18
Payer: COMMERCIAL

## 2024-06-18 ENCOUNTER — TELEPHONE (OUTPATIENT)
Dept: FAMILY MEDICINE | Facility: CLINIC | Age: 34
End: 2024-06-18

## 2024-06-18 ENCOUNTER — PRE VISIT (OUTPATIENT)
Dept: UROLOGY | Facility: CLINIC | Age: 34
End: 2024-06-18

## 2024-06-18 ENCOUNTER — TELEPHONE (OUTPATIENT)
Dept: UROLOGY | Facility: CLINIC | Age: 34
End: 2024-06-18

## 2024-06-18 ENCOUNTER — PATIENT OUTREACH (OUTPATIENT)
Dept: NURSING | Facility: CLINIC | Age: 34
End: 2024-06-18
Payer: COMMERCIAL

## 2024-06-18 DIAGNOSIS — R33.9 URINARY RETENTION: ICD-10-CM

## 2024-06-18 DIAGNOSIS — G47.33 OBSTRUCTIVE SLEEP APNEA: ICD-10-CM

## 2024-06-18 DIAGNOSIS — E11.9 TYPE 2 DIABETES MELLITUS WITHOUT COMPLICATION, WITH LONG-TERM CURRENT USE OF INSULIN (H): ICD-10-CM

## 2024-06-18 DIAGNOSIS — E78.5 HYPERLIPIDEMIA, UNSPECIFIED HYPERLIPIDEMIA TYPE: Chronic | ICD-10-CM

## 2024-06-18 DIAGNOSIS — T83.511A URINARY TRACT INFECTION ASSOCIATED WITH CATHETERIZATION OF URINARY TRACT, UNSPECIFIED INDWELLING URINARY CATHETER TYPE, INITIAL ENCOUNTER (H): ICD-10-CM

## 2024-06-18 DIAGNOSIS — Z79.4 TYPE 2 DIABETES MELLITUS WITHOUT COMPLICATION, WITH LONG-TERM CURRENT USE OF INSULIN (H): ICD-10-CM

## 2024-06-18 DIAGNOSIS — F32.2 MAJOR DEPRESSIVE DISORDER, SINGLE EPISODE, SEVERE (H): ICD-10-CM

## 2024-06-18 DIAGNOSIS — N39.0 URINARY TRACT INFECTION ASSOCIATED WITH CATHETERIZATION OF URINARY TRACT, UNSPECIFIED INDWELLING URINARY CATHETER TYPE, INITIAL ENCOUNTER (H): ICD-10-CM

## 2024-06-18 DIAGNOSIS — Z78.9 DIFFICULTY MANAGING URINARY CATHETER: ICD-10-CM

## 2024-06-18 DIAGNOSIS — E55.9 VITAMIN D DEFICIENCY: ICD-10-CM

## 2024-06-18 DIAGNOSIS — N31.9 NEUROGENIC BLADDER: Primary | ICD-10-CM

## 2024-06-18 LAB
FACT VIII ACT/NOR PPP: 103 % (ref 55–200)
PATH REPORT.COMMENTS IMP SPEC: NORMAL
VON WILLEBRAND EVAL PPP-IMP: NORMAL
VWF AG ACT/NOR PPP IA: 154 % (ref 50–200)
VWF:AC ACT/NOR PPP IA: 151 % (ref 50–180)

## 2024-06-18 PROCEDURE — 99203 OFFICE O/P NEW LOW 30 MIN: CPT | Mod: 95 | Performed by: PHYSICIAN ASSISTANT

## 2024-06-18 RX ORDER — ERGOCALCIFEROL 1.25 MG/1
50000 CAPSULE, LIQUID FILLED ORAL WEEKLY
Qty: 12 CAPSULE | Refills: 5 | Status: SHIPPED | OUTPATIENT
Start: 2024-06-18 | End: 2024-06-19

## 2024-06-18 RX ORDER — NITROFURANTOIN 25; 75 MG/1; MG/1
100 CAPSULE ORAL 2 TIMES DAILY
Qty: 14 CAPSULE | Refills: 0 | OUTPATIENT
Start: 2024-06-18

## 2024-06-18 RX ORDER — NITROFURANTOIN 25; 75 MG/1; MG/1
100 CAPSULE ORAL 2 TIMES DAILY
Qty: 14 CAPSULE | Refills: 0 | Status: SHIPPED | OUTPATIENT
Start: 2024-06-18 | End: 2024-06-20

## 2024-06-18 NOTE — LETTER
[facility] CARE COORDINATION  [address] [address]    June 19, 2024    Pacheco David  [address] [facility] [address]      Dear Pacheco,    I am a clinic care coordinator who works with Lary Nicolas MD with the [facility]. I wanted to thank you for spending the time to talk with me.  Below is a description of clinic care coordination and how I can further assist you.       The clinic care coordination team is made up of a registered nurse, , financial resource worker and community health worker who understand the health care system. The goal of clinic care coordination is to help you manage your health and improve access to the health care system. Our team works alongside your provider to assist you in determining your health and social needs. We can help you obtain health care and community resources, providing you with necessary information and education. We can work with you through any barriers and develop a care plan that helps coordinate and strengthen the communication between you and your care team.  Our services are voluntary and are offered without charge to you personally.    Please feel free to contact me with any questions or concerns regarding care coordination and what we can offer.      We are focused on providing you with the highest-quality healthcare experience possible.    Sincerely,     Eloina Diaz, [facility]   Social Work Care Coordinator   [facility]  [phone]

## 2024-06-18 NOTE — TELEPHONE ENCOUNTER
"Patient returned call. Writer relayed result messages from Dr. Nicolas and Dr. Donato. Patient verbalizes understanding.   Patient asked if recent prescriptions can be resent to [facility]--pended, routing to PCP to sign. He has not picked up macrobid yet, neurology appt 6/20/24.  Patient confirmed he is/has been taking lamotrigine 50mg BID.  Advised patient some labs for \"easy bruising\" are still in process, we will contact him with results as available.  MA appt for B12 injection scheduled 6/21/24.    Deepti Abernathy RN  [facility]        "

## 2024-06-18 NOTE — TELEPHONE ENCOUNTER
----- Message from Gila Donato MD sent at 6/17/2024  9:30 AM CDT -----  Team - please call patient with results and send result letter with this information if patient desires for their records. [facility] as needed.      Pt was seen by Dr Nicolas last week- Urine is growing bacteria.    I recommend treating with macrobid bid for 7 days- rx sent in today     Dr Nicolas will address his other labs later this week             Lary Nicolas MD  [facility] - Primary [facility]  1. Please make sure he got and started his antibiotic (nitrofurantoin (Macrobid)) for his urine infection.  ----I believe that his urine infection may be causing some of his muscle spasticity. If that's the case, he should notice gradual improvement over the next several weeks.  ----He probably needs more than 7 days of antibiotics for it because of his neurogenic bladder, but I will defer to neurology for this. He has an appointment to see neurology tomorrow. Will you confirm that he knows about that appointment and has transportation.    2. Lamotrigine is on his medication list and marked as taking. But his level in his blood is undetectable. Please see what's going on with that.    3. His vitamin B12 level is low. This may be affecting his nerves. I want him to come in to clinic for a vitamin B12 shot once a month. I have put in orders.    4. His vitamin D level is low. This may be affecting how he feels. I want him to add one vitamin D pill per week.    5. His diabetes is MUCH better. A1c is 5.9%.    6. Thyroid is good. Cholesterol is good. No lyme disease. Kidney and liver tests are good. No syphilis. Blood counts are good.

## 2024-06-18 NOTE — LETTER
[facility]  Patient Centered Plan of Care  About Me:        Patient Name:  Pacheco Jimenez    YOB: 1990  Age:         33 year old   Brandi MRN:    [id number] Telephone Information:  Home Phone [phone]   Mobile [phone]   Mobile Not on file.   Home Phone Not on file.   Home Phone Not on file.       Address:  [address] [place] [address] Email address:  [email or web address]      Emergency Contact(s)    Name Relationship Lgl Grd Work Phone Home Phone Mobile Phone   1. KECIA JIMENEZ Mother    [phone]   2. FABIANA ABARCA Sister    [phone]           Primary language:  English     needed? No   [employer]:  [phone] op. 1  Other communication barriers:None    Preferred Method of Communication:     Current living arrangement: I live in a private home with family    Mobility Status/ Medical Equipment: Independent w/Device        Health Maintenance  Health Maintenance Reviewed: Due/Overdue   Overdue          Never done EYE EXAM (Yearly)   Last ordered: Mar 27, 2024     JUL 17  2021 MEDICARE ANNUAL WELLNESS VISIT (Yearly)  Last completed: Jul 17, 2020 APR 7 2024 URINE DRUG SCREEN (Yearly)   Last ordered: Jun 14, 2024         My Access Plan  Medical Emergency 911   Primary Clinic Line [facility] [phone]   24 Hour Appointment Line [phone] or  [address] [place] [address] [phone]) (toll-free)   24 Hour Nurse Line [phone] (toll-free)   Preferred Urgent Care [facility], [phone]     Preferred Hospital No data recorded   Preferred Pharmacy [facility] - [place], [place] - [address]     Behavioral Health Crisis Line The National Suicide Prevention Lifeline at 1-964.821.6610 or Text/Call 548           My Care Team Members  Patient Care Team         Relationship Specialty Notifications Start End    Lary Nicolas MD PCP - General Family Medicine  3/15/24     Phone:  [phone] Fax: [phone]         [address] [place] [address]    Provider, [facility] Community Health Worker Primary Care - CC Admissions 12/18/14     Marcial Rojas MD Physician Neurology  4/14/23     Phone: [phone] Fax: [phone]         [address] [place] [address]    Lary Nicolas MD Assigned PCP   4/15/23     Phone: [phone] Fax: [phone]         [address] [place] [address]    Lucas Hernandez [facility] Therapist Licensed Mental Health  3/15/24     LEIGH faxed to the ED 3/15/24    Phone: [phone] Fax: [phone] [address] [place] [address]    Milton Denise MD Psychiatrist Psychiatry  3/15/24     LEIGH faxed to the ED 3/15/24    Phone: [phone] Fax: [phone] [address] [facility] [address]    Breezy Pathak RPH Pharmacist Pharmacist  3/29/24     Phone: [phone] Fax: [phone]         [address] [place] [address]    Radha Whitehead RD Diabetes Educator Nutrition  4/5/24     Phone: [phone] Fax: [phone]         [address] [place] [address]    Radha Michaud RD Registered Dietitian Dietitian, Registered  4/19/24     Phone: [phone] Fax: [phone]         [address] [place] [address]    Breezy Pathak RPH Assigned MTM Pharmacist   4/23/24     Phone: [phone] Fax: [phone]         [address] [facility] [address]    Radha Michaud RD Registered Dietitian Diabetes Education  5/28/24     Phone: [phone] Fax: [phone]         [address] [place] [address]    Goldie Sloan OD MD Optometry  5/29/24     Phone: [phone] Fax: [phone]         [address] [facility] [address]    Alhaji Fiore Lead Care Coordinator  Admissions 6/18/24     Nancy Pineda, W Community Health Worker  Admissions 6/19/24     Phone: [phone]                     My Care Plans  Self Management and Treatment Plan    Care Plan  Care Plan: Transportation       Problem: Lack of  transportation       Goal: Establish reliable transportation       Start Date: 6/18/2024    This Visit's Progress: 10%    Note:     Barriers: N/A  Strengths: Family support, waiver services  Patient expressed understanding of goal: Yes  Action steps to achieve this goal:  1. I will wait for the metro mobility to be mailed to my home and have my family assist me with completing the document if needed  2. I will reschedule my upcoming appts that I am unable to currently make due to transportation concerns and connect with my family for their support for appointments that they can help me get to  3. I will connect with CCC team at least once a month to discuss goal progression and address any other needs/concerns that may arise                               Action Plans on File:                       Advance Care Plans/Directives:   Advanced Care Plan/Directives on file:   No    Discussed with patient/caregiver(s):   Declined Further Information             My Medical and Care Information  Problem List   Patient Active Problem List   Diagnosis    Allergies    Vitamin D deficiency    Hyperlipidemia    Major Depression Severe Single Episode    Arnold-Chiari malformation, type II (H)    Obstructive sleep apnea    Syringomyelia (H)    Epilepsy And Recurrent Seizures    Keloid scar of skin    Ataxia    Dysphagia    Recurrent occipital headache     Shunt    Anemia    Neuropathic pain    Neurogenic bladder    Neurogenic bowel    Controlled substance agreement signed    Angular cheilitis    Cognitive deficits    Sleep disturbances    Myofascial muscle pain    Mood disorder due to medical condition    Moderate episode of recurrent major depressive disorder (H)    Hydrocephalus with operating shunt (H)    H/O meningitis    Encounter for therapeutic drug monitoring    Chronic pain disorder    Backache    Diabetes mellitus, type 2 (H)    Morbid obesity (H)    Bleeds easily (H24)    Vitamin B12 deficiency (non anemic)     Spasticity    Abnormal LFTs (liver function tests)    Transportation insecurity      Current Medications and Allergies:  See printed Medication Report.    Care Coordination Start Date: 6/14/2024   Frequency of Care Coordination: monthly, more frequently as needed     Form Last Updated: 06/19/2024

## 2024-06-18 NOTE — TELEPHONE ENCOUNTER
Attempt # 1 to reach patient, sent to voicemail. [facility].    Lisy Cuevas RN on 6/18/2024 at 10:52 AM

## 2024-06-18 NOTE — NURSING NOTE
Is the patient currently in the state of [place]? NO    Visit mode:VIDEO    If the visit is dropped, the patient can be reconnected by: VIDEO VISIT: Text to cell phone:   Telephone Information:   Mobile [phone]   Mobile Not on file.       Will anyone else be joining the visit? NO  (If patient encounters technical issues they should call [phone])    How would you like to obtain your AVS? MyChart    Are changes needed to the allergy or medication list? No    Are refills needed on medications prescribed by this physician? NO    Reason for visit: Consult    Nevaeh MONDRAGON

## 2024-06-18 NOTE — TELEPHONE ENCOUNTER
This writer called Pacheco back to discuss his concerns about getting to the [facility] location.  He stated that he would prefer to go to [place] and would be calling to see if he could get scheduled there.  This writer advised that they may not be able to see him and would keep his nurse visit at the [facility] location on for now in the event he is not able to be seen at [place].     Rena Nash RN   4:28 PM

## 2024-06-18 NOTE — PATIENT INSTRUCTIONS
UROLOGY CLINIC VISIT PATIENT INSTRUCTIONS    We will attempt to get you in for an appointment in the urology clinic in the next 1-2 days. If you are unable to urinate at all, and cannot get a catheter in, you may need to go to the Emergency Room.     If you have any issues, questions or concerns in the meantime, do not hesitate to contact us at [phone] or via [employer].     It was a pleasure meeting with you today.  Thank you for allowing me and my team the privilege of caring for you today.  YOU are the reason we are here, and I truly hope we provided you with the excellent service you deserve.  Please let us know if there is anything else we can do for you so that we can be sure you are leaving completely satisfied with your care experience.

## 2024-06-18 NOTE — TELEPHONE ENCOUNTER
Medication Question or Refill    Contacts         Type Contact Phone/Fax    06/18/2024 01:41 PM CDT Phone (Incoming) DavidPacheco (Self) [phone] (M)            What medication are you calling about (include dose and sig)?:   vitamin D2 (ERGOCALCIFEROL) 30718 units (1250 mcg) capsule  semaglutide (OZEMPIC) 2 MG/3ML pen    Preferred Pharmacy:    [id number] [address] & [address] [address]  Phone: [phone] Fax: [phone]      Controlled Substance Agreement on file:   CSA -- Patient Level:    CSA: None found at the patient level.       Who prescribed the medication?: Lary Nicolas MD     Do you need a refill? No    When did you use the medication last? N/a    Patient offered an appointment? No    Do you have any questions or concerns?  Yes: Please send to updated pharmacy listed above.       Could we send this information to you in [employer] or would you prefer to receive a phone call?:   Patient would prefer a phone call   Okay to leave a detailed message?: Yes at Cell number on file:    Telephone Information:   Mobile [phone]   Mobile Not on file.
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
## 2024-06-18 NOTE — TELEPHONE ENCOUNTER
He needs antibiotics started TODAY. How can we get antibiotics to him TODAY? I'm guessing that mail order isn't best for that. I sent his other meds to mail order.    Future Appointments   Date Time Provider Department Center   6[facility]18/2024  3[facility] PM [facility]   6/21/2024 11:00 AM SPRS [facility]LPN [facility]   6/21/2024  2:30 PM Breezy Pathak RPH [facility]   6/28/2024  2:40 PM [facility] Bowser D[facility]   8/29/2024  2:20 PM [facility]

## 2024-06-18 NOTE — TELEPHONE ENCOUNTER
[facility] Call Center    Phone Message    May a detailed message be left on voicemail: no     Reason for Call: Other: Patient called and stated that his appointment for tomorrow is too far away. He was wondering if he could have this done in [place]. Please call patient to discuss.     Action Taken: Message routed to:  [facility] ([facility] Urology    Travel Screening: Not Applicable

## 2024-06-18 NOTE — TELEPHONE ENCOUNTER
Called patient to discuss options for picked up antibiotic today.  Patient says family is too busy to assist with this--but gives writer permission to contact sister, Airam.  Same day delivery from [facility] or  mail order?  Per Heladio Silva, Mail order pharmacy is not able to do same day delivery.  Sister, Airam, is able to  today if medication is sent to [facility] on [address]. Writer re-transmitted order to this pharmacy.  Contacted [facility] to ensure original order is cancelled.  Notified patient that Airam is able to  medication today.    Deepti Abernathy RN  [facility]
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
## 2024-06-18 NOTE — TELEPHONE ENCOUNTER
This writer called PT to discuss setting up a PVR after receiving a message from Estelita Henriquez.  Nurse visit made for tomorrow.      Rena Nash RN

## 2024-06-19 RX ORDER — ERGOCALCIFEROL 1.25 MG/1
50000 CAPSULE, LIQUID FILLED ORAL WEEKLY
Qty: 12 CAPSULE | Refills: 5 | Status: SHIPPED | OUTPATIENT
Start: 2024-06-19

## 2024-06-19 RX ORDER — ERGOCALCIFEROL 1.25 MG/1
50000 CAPSULE, LIQUID FILLED ORAL WEEKLY
Qty: 12 CAPSULE | Refills: 5 | Status: CANCELLED | OUTPATIENT
Start: 2024-06-19

## 2024-06-19 ASSESSMENT — ACTIVITIES OF DAILY LIVING (ADL): DEPENDENT_IADLS:: CLEANING;COOKING;LAUNDRY;MEAL PREPARATION;TRANSPORTATION;SHOPPING

## 2024-06-19 NOTE — PROGRESS NOTES
Clinic Care Coordination Contact  Clinic Care Coordination Contact  OUTREACH    Referral Information:  Referral Source: PCP    CC SW called and connected with pt this morning. SW introduced herself, explained why she was calling, and discussed immediate needs with pt. Pt reports that he is looking for transportation options at this time. Pt states that he had contacted his insurance and they reported that he did not qualify for medical transportation through his current plan. SW and pt discussed metro mobility as an alternative option to look into. Pt was open to trying them out. SW offered to send the application to him via mail to complete the pt portion of the application. SW notified pt that there was a section that would need to be completed by his provider as well.    CC SW and pt completed the CCC assessment questionnaire.      Chief Complaint   Patient presents with    Clinic Care Coordination - Initial        Universal Utilization: Appropriate  Clinic Utilization  Difficulty keeping appointments:: No  Compliance Concerns: No  No-Show Concerns: No  No PCP office visit in Past Year: No  Utilization      No Show Count (past year)  0             ED Visits  2             Hospital Admissions  0                    Current as of: 6/19/2024  8:23 AM                Clinical Concerns:  Current Medical Concerns:  Epilepsy and recurrent seizures    Current Behavioral Concerns: Major Depression, sleep disturbances    Education Provided to patient: [employer] Mobility   Pain  Pain (GOAL):: No  Health Maintenance Reviewed: Due/Overdue     Overdue          Never done EYE EXAM (Yearly)   Last ordered: Mar 27, 2024     JUL 17  2021 MEDICARE ANNUAL WELLNESS VISIT (Yearly)  Last completed: Jul 17, 2020 APR 7 2024 URINE DRUG SCREEN (Yearly)   Last ordered: Jun 14, 2024     Clinical Pathway: None    Medication Management:  Medication review status: Not reviewed due to nature of conversation       Functional Status:  Dependent  ADLs:: Wheelchair-independent, Bathing, Dressing, Transfers, Positioning  Dependent IADLs:: Cleaning, Cooking, Laundry, Meal Preparation, Transportation, Shopping  Bed or wheelchair confined:: Yes  Mobility Status: Independent w/Device  Fallen 2 or more times in the past year?: No  Any fall with injury in the past year?: No    Living Situation:  Current living arrangement:: I live in a private home with family  Type of residence:: Private home - no stairs    Lifestyle & Psychosocial Needs:    Social Determinants of Health     Food Insecurity: Not on file   Depression: Not at risk (6/18/2024)    PHQ-2     PHQ-2 Score: 2   Recent Concern: Depression - At risk (5/17/2024)    Received from [facility]    PHQ-2     PHQ-2 Score: 3   Housing Stability: Not on file   Tobacco Use: Low Risk  (6/18/2024)    Patient History     Smoking Tobacco Use: Never     Smokeless Tobacco Use: Never     Passive Exposure: Never   Financial Resource Strain: High Risk (1/1/2022)    [facility] [facility] [employer] [facility] [employer] [facility]    Financial Resource Strain     Difficulty of Paying Living Expenses: Not on file     Difficulty of Paying Living Expenses: Not on file   Alcohol Use: Not on file   Transportation Needs: Not on file   Physical Activity: Insufficiently Active (3/27/2024)    Exercise Vital Sign     Days of Exercise per Week: 4 days     Minutes of Exercise per Session: 30 min   Interpersonal Safety: Low Risk  (3/27/2024)    Interpersonal Safety     Do you feel physically and emotionally safe where you currently live?: Yes     Within the past 12 months, have you been hit, slapped, kicked or otherwise physically hurt by someone?: No     Within the past 12 months, have you been humiliated or emotionally abused in other ways by your partner or ex-partner?: No   Stress: Stress Concern Present (3/27/2024)    [employer] - Occupational Stress Questionnaire      Feeling of Stress : Rather much   Social Connections: Unknown (3/27/2024)    Social Connection and Isolation Panel [NHANES]     Frequency of Communication with Friends and Family: Not on file     Frequency of Social Gatherings with Friends and Family: Never     Attends Rastafari Services: Not on file     Active Member of Clubs or Organizations: Not on file     Attends Club or Organization Meetings: Not on file     Marital Status: Not on file   Health Literacy: Not on file     Diet:: Regular  Inadequate nutrition (GOAL):: No  Tube Feeding: No  Inadequate activity/exercise (GOAL):: No  Significant changes in sleep pattern (GOAL): No  Transportation means:: Family, Regular car, Other (Father and 2 pca's per pt shared on 6/14/2024.)     Rastafari or spiritual beliefs that impact treatment:: No  Mental health DX:: No  Mental health management concern (GOAL):: No  Chemical Dependency Status: No Current Concerns  Informal Support system:: Family, Friends (PCA's and father per pt shared on 6/14/2024.)        Resources and Interventions:  Current Resources:      Community Resources: PCA, Financial/Insurance, [facility] Programs     Equipment Currently Used at Home: wheelchair, power  Employment Status: disabled     Advance Care Plan/Directive  Advanced Care Plans/Directives on file:: No  Discussed with patient/caregiver:: Declined Further Information    Referrals Placed: Transportation     Care Plan:  Care Plan: Transportation       Problem: Lack of transportation       Goal: Establish reliable transportation       Start Date: 6/18/2024    This Visit's Progress: 10%    Note:     Barriers: N/A  Strengths: Family support, waiver services  Patient expressed understanding of goal: Yes  Action steps to achieve this goal:  1. I will wait for the metro mobility to be mailed to my home and have my family assist me with completing the document if needed  2. I will reschedule my upcoming appts that I am unable to currently make due to  transportation concerns and connect with my family for their support for appointments that they can help me get to  3. I will connect with CCC team at least once a month to discuss goal progression and address any other needs/concerns that may arise                               Patient/Caregiver understanding: Yes    Outreach Frequency: monthly, more frequently as needed  Future Appointments                In 2 days SPRS MA/LPN [facility], [facility] SPRS    In 2 days Breezy Pathak RPH [facility] Primary Care [facility], [facility]    In 5 days MPLW KSI NURSE [facility] [place], [facility] MPLW    In 1 week Romaine Bowser DPM [facility], [facility] MPLW    In 1 month Lindy Montenegro CNP [facility] Urology Clinic [facility]    In 2 months Lary Nicolas MD [facility], [facility] SPRS            Plan: CCC team to connect with pt every 4 weeks per standard outreach. CC SW to mail [employer] mobility application to pt    DAISHA Cross   Social Work Care Coordinator   [facility]  [phone]

## 2024-06-20 RX ORDER — NITROFURANTOIN 25; 75 MG/1; MG/1
100 CAPSULE ORAL 2 TIMES DAILY
Qty: 14 CAPSULE | Refills: 0 | Status: SHIPPED | OUTPATIENT
Start: 2024-06-25 | End: 2024-07-02

## 2024-06-21 ENCOUNTER — TELEPHONE (OUTPATIENT)
Dept: FAMILY MEDICINE | Facility: CLINIC | Age: 34
End: 2024-06-21

## 2024-06-21 ENCOUNTER — TELEPHONE (OUTPATIENT)
Dept: PHARMACY | Facility: CLINIC | Age: 34
End: 2024-06-21

## 2024-06-21 NOTE — TELEPHONE ENCOUNTER
Called patient back, unable to reach patient. Left a . If patient calls back please let him know that an order was put in a Hospital Bed by Dr. Nicolas back on 6/14/24.     Patient instructions: This item cannot be fullfilled by [employer]. Please contact your insurance provider for additional support.         Harvinder Bhat, MSN, RN   [facility]

## 2024-06-21 NOTE — TELEPHONE ENCOUNTER
Patient Returning Call    Reason for call:  question about hospital bed and returning call from doctor    Information relayed to patient:  message will be sent    Patient has additional questions:  No      Could we send this information to you in [employer] or would you prefer to receive a phone call?:   Patient would prefer a phone call   Okay to leave a detailed message?: Yes at Cell number on file:    Telephone Information:   Mobile [phone]   Mobile Not on file.

## 2024-06-21 NOTE — TELEPHONE ENCOUNTER
Reason for call:  Other   Patient called regarding (reason for call): call back  Additional comments: patient needs orders for a medical mattress  Call to advise    Phone number to reach patient:  Home number on file [phone] (home)    Best Time:  any    Can we leave a detailed message on this number?  YES    Travel screening: Not Applicable

## 2024-06-22 DIAGNOSIS — E78.5 HYPERLIPIDEMIA, UNSPECIFIED HYPERLIPIDEMIA TYPE: Primary | Chronic | ICD-10-CM

## 2024-06-24 ENCOUNTER — TELEPHONE (OUTPATIENT)
Dept: FAMILY MEDICINE | Facility: CLINIC | Age: 34
End: 2024-06-24

## 2024-06-24 RX ORDER — ROSUVASTATIN CALCIUM 20 MG/1
TABLET, COATED ORAL
Qty: 90 TABLET | Refills: 3 | Status: SHIPPED | OUTPATIENT
Start: 2024-06-24

## 2024-06-24 NOTE — TELEPHONE ENCOUNTER
Forms/Letter Request    Type of form/letter: DME (wheelchair, hospital bed)    Type of DME requested: Hospital bed    Do we have the form/letter: Yes: via right fax    Who is the form from? [employer] (if other please explain)    Where did/will the form come from? form was faxed in    When is form/letter needed by: ASAP    How would you like the form/letter returned: Fax : [phone]    Patient Notified form requests are processed in 5-7 business days:No

## 2024-06-24 NOTE — TELEPHONE ENCOUNTER
Patient called back to provide his new DME that takes patient insurance is now -     Patient requested the hospital bed be refaxed to the following DME     [employer]  [address]  [place] [address]  [phone] (p)  [phone] (f)     RN contacted [employer] of what information is/are needed in addition to the order.  [facility] will fax to clinic what information are needed and in what criteria.  RN provide RS phone and fax to [employer].    Gino Tipton RN  [facility], [facility]

## 2024-06-25 ENCOUNTER — NURSE TRIAGE (OUTPATIENT)
Dept: FAMILY MEDICINE | Facility: CLINIC | Age: 34
End: 2024-06-25
Payer: COMMERCIAL

## 2024-06-25 NOTE — TELEPHONE ENCOUNTER
Patient unable to get transportation today as he was advised to be seen in [facility].  Scheduled him in his home clinic for tomorrow.

## 2024-06-25 NOTE — TELEPHONE ENCOUNTER
"Reason for Disposition    SEVERE diarrhea (e.g., 7 or more times / day more than normal) and present > 24 hours (1 day)    Additional Information    Negative: SEVERE diarrhea (e.g., 7 or more times / day more than normal) and age > 60 years    Negative: Constant abdominal pain lasting > 2 hours    Negative: Drinking very little and dehydration suspected (e.g., no urine > 12 hours, very dry mouth, very lightheaded)    Negative: Patient sounds very sick or weak to the triager    Negative: SEVERE abdominal pain (e.g., excruciating) and present > 1 hour    Negative: SEVERE abdominal pain and age > 60 years    Negative: Bloody, black, or tarry bowel movements  (Exception: Chronic-unchanged black-grey bowel movements and is taking iron pills or Pepto-Bismol.)    Negative: Vomiting also present and worse than the diarrhea    Negative: Blood in stool and without diarrhea    Negative: Shock suspected (e.g., cold/pale/clammy skin, too weak to stand, low BP, rapid pulse)    Negative: Difficult to awaken or acting confused (e.g., disoriented, slurred speech)    Negative: Sounds like a life-threatening emergency to the triager    Answer Assessment - Initial Assessment Questions  1. DIARRHEA SEVERITY: \"How bad is the diarrhea?\" \"How many more stools have you had in the past 24 hours than normal?\"     - NO DIARRHEA (SCALE 0)    - MILD (SCALE 1-3): Few loose or mushy BMs; increase of 1-3 stools over normal daily number of stools; mild increase in ostomy output.    -  MODERATE (SCALE 4-7): Increase of 4-6 stools daily over normal; moderate increase in ostomy output.    -  SEVERE (SCALE 8-10; OR \"WORST POSSIBLE\"): Increase of 7 or more stools daily over normal; moderate increase in ostomy output; incontinence.      10  2. ONSET: \"When did the diarrhea begin?\"       4 days ago  3. BM CONSISTENCY: \"How loose or watery is the diarrhea?\"       Really loose, to watery  4. VOMITING: \"Are you also vomiting?\" If Yes, ask: \"How many times in " "the past 24 hours?\"       No  5. ABDOMEN PAIN: \"Are you having any abdomen pain?\" If Yes, ask: \"What does it feel like?\" (e.g., crampy, dull, intermittent, constant)       No  6. ABDOMEN PAIN SEVERITY: If present, ask: \"How bad is the pain?\"  (e.g., Scale 1-10; mild, moderate, or severe)    - MILD (1-3): doesn't interfere with normal activities, abdomen soft and not tender to touch     - MODERATE (4-7): interferes with normal activities or awakens from sleep, abdomen tender to touch     - SEVERE (8-10): excruciating pain, doubled over, unable to do any normal activities        NA  7. ORAL INTAKE: If vomiting, \"Have you been able to drink liquids?\" \"How much liquids have you had in the past 24 hours?\"      Not vomiting  8. HYDRATION: \"Any signs of dehydration?\" (e.g., dry mouth [not just dry lips], too weak to stand, dizziness, new weight loss) \"When did you last urinate?\"      Going very small amounts, this morning  9. EXPOSURE: \"Have you traveled to a foreign country recently?\" \"Have you been exposed to anyone with diarrhea?\" \"Could you have eaten any food that was spoiled?\"      Dont think so  10. ANTIBIOTIC USE: \"Are you taking antibiotics now or have you taken antibiotics in the past 2 months?\"        No  11. OTHER SYMPTOMS: \"Do you have any other symptoms?\" (e.g., fever, blood in stool)        Yes, rectal bleeding during wiping.    12. PREGNANCY: \"Is there any chance you are pregnant?\" \"When was your last menstrual period?\"        NA    Protocols used: Diarrhea-A-[place]    "

## 2024-06-26 ENCOUNTER — OFFICE VISIT (OUTPATIENT)
Dept: FAMILY MEDICINE | Facility: CLINIC | Age: 34
End: 2024-06-26
Payer: COMMERCIAL

## 2024-06-26 ENCOUNTER — VIRTUAL VISIT (OUTPATIENT)
Dept: URGENT CARE | Facility: CLINIC | Age: 34
End: 2024-06-26
Payer: COMMERCIAL

## 2024-06-26 VITALS
OXYGEN SATURATION: 97 % | TEMPERATURE: 98.7 F | HEART RATE: 94 BPM | SYSTOLIC BLOOD PRESSURE: 124 MMHG | DIASTOLIC BLOOD PRESSURE: 86 MMHG | RESPIRATION RATE: 17 BRPM

## 2024-06-26 DIAGNOSIS — K60.2 ANAL FISSURE: ICD-10-CM

## 2024-06-26 DIAGNOSIS — H61.22 IMPACTED CERUMEN OF LEFT EAR: ICD-10-CM

## 2024-06-26 DIAGNOSIS — R19.7 DIARRHEA, UNSPECIFIED TYPE: ICD-10-CM

## 2024-06-26 DIAGNOSIS — K92.1 HEMATOCHEZIA: Primary | ICD-10-CM

## 2024-06-26 DIAGNOSIS — K62.5 BRBPR (BRIGHT RED BLOOD PER RECTUM): Primary | ICD-10-CM

## 2024-06-26 LAB — HGB BLD-MCNC: 14.2 G/DL (ref 13.3–17.7)

## 2024-06-26 PROCEDURE — 87507 IADNA-DNA/RNA PROBE TQ 12-25: CPT | Mod: GZ | Performed by: STUDENT IN AN ORGANIZED HEALTH CARE EDUCATION/TRAINING PROGRAM

## 2024-06-26 PROCEDURE — 99213 OFFICE O/P EST LOW 20 MIN: CPT | Mod: 25 | Performed by: STUDENT IN AN ORGANIZED HEALTH CARE EDUCATION/TRAINING PROGRAM

## 2024-06-26 PROCEDURE — 87493 C DIFF AMPLIFIED PROBE: CPT | Mod: 59 | Performed by: STUDENT IN AN ORGANIZED HEALTH CARE EDUCATION/TRAINING PROGRAM

## 2024-06-26 PROCEDURE — 69209 REMOVE IMPACTED EAR WAX UNI: CPT | Mod: LT | Performed by: STUDENT IN AN ORGANIZED HEALTH CARE EDUCATION/TRAINING PROGRAM

## 2024-06-26 PROCEDURE — 99207 PR NON-BILLABLE SERV PER CHARTING: CPT | Performed by: EMERGENCY MEDICINE

## 2024-06-26 PROCEDURE — 85018 HEMOGLOBIN: CPT | Performed by: STUDENT IN AN ORGANIZED HEALTH CARE EDUCATION/TRAINING PROGRAM

## 2024-06-26 PROCEDURE — 36415 COLL VENOUS BLD VENIPUNCTURE: CPT | Performed by: STUDENT IN AN ORGANIZED HEALTH CARE EDUCATION/TRAINING PROGRAM
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
## 2024-06-26 NOTE — PROGRESS NOTES
Video visit:  Start time: 9:29 AM  Stop time: 9:34 AM  Duration: 5 minutes  Patient location: At home  Provider location: [facility] virtual provider (remote).  Platform used for video visit: Tino      CHIEF COMPLAINT: Diarrhea.  Blood in stool.      HPI: Patient is a 33-year-old male whose had a 2-day history of diarrhea.  He is now noticing blood in his stool which started last night.  He said 3 episodes of diarrhea with blood today.  He does complain of abdominal pain.  No chronic GI illness.  No recent foreign travel.      ROS: See HPI otherwise normal.    Allergies   Allergen Reactions    Vancomycin     Pregabalin Rash      Current Outpatient Medications   Medication Sig Dispense Refill    ACCU-CHEK GUIDE test strip Use to test blood sugar once daily. 50 strip 6    baclofen (LIORESAL) 10 MG tablet Take 1 tablet (10 mg) by mouth 3 times daily 100 tablet 0    blood glucose (ACCU-CHEK SOFTCLIX) lancing device Lancing device to be used with lancets. 1 each 0    blood glucose monitoring (NO BRAND SPECIFIED) meter device kit Use to test blood sugar 1 times daily or as directed. Preferred blood glucose meter OR supplies to accompany: Blood Glucose Monitor Brands: per insurance. 1 kit 0    blood glucose monitoring (SOFTCLIX) lancets Use to test blood sugar once daily. 100 each 4    DULoxetine (CYMBALTA) 60 MG capsule Take 60 mg by mouth      ergocalciferol (ERGOCALCIFEROL) 1.25 MG (52240 UT) capsule Take 1 capsule (50,000 Units) by mouth once a week for 56 days, THEN 1 capsule (50,000 Units) every 30 days for 360 days. 10 capsule 1    fluticasone (FLONASE) 50 MCG/ACT nasal spray Spray 2 sprays into both nostrils 2 times daily      gabapentin (NEURONTIN) 600 MG tablet Take 3 tablets by mouth at bedtime Take three tablet at bedtime.      gabapentin (NEURONTIN) 600 MG tablet Take 2 tablets by mouth 2 times daily Take two tablet in the morning and evening.      ibuprofen (ADVIL/MOTRIN) 600 MG tablet Take 1 tablet  (600 mg) by mouth every 6 hours as needed for moderate pain 50 tablet 2    lamoTRIgine (LAMICTAL) 25 MG tablet Take 2 tablets by mouth 2 times daily      lidocaine (LIDODERM) 5 % patch PLACE ONTO THE SKIN EVERY 24 HOURS TO PREVENT LIDOCAINE TOXICITY, PATIENT SHOULD BE PATCH FREE FOR 12 HRS DAILY. 30 patch 4    loratadine (ALLERGY RELIEF) 10 MG tablet TAKE 1 PILL BY MOUTH EVERY DAY/ NOJ IB LUB IB HNUB PAB ALLERGY 90 tablet 0    metFORMIN (GLUCOPHAGE XR) 500 MG 24 hr tablet Take 1 tablet (500 mg) by mouth 2 times daily (with meals) 120 tablet 3    mometasone (NASONEX) 50 MCG/ACT nasal spray Spray 2 sprays in nostril daily 17 g 4    nitroFURantoin macrocrystal-monohydrate (MACROBID) 100 MG capsule Take 1 capsule (100 mg) by mouth 2 times daily for 7 days Pacheco was given 7 of 14 days supply on 6/18/24. He should complete 14 days of treatment. 14 capsule 0    ondansetron (ZOFRAN ODT) 8 MG ODT tab Take 8 mg by mouth every 8 hours as needed for nausea      oxyCODONE-acetaminophen (PERCOCET) 5-325 MG tablet Take 1 tablet by mouth every 12 hours as needed for moderate pain (For when pain is not well-controlled after taking ibuprofen + acetaminophen.) 10 tablet 0    polyethylene glycol (MIRALAX) 17 GM/Dose powder Take 17 g by mouth daily      rosuvastatin (CRESTOR) 20 MG tablet TAKE 1 PILL BY MOUTH EVERY DAY AT BEDTIME/TXHUA HMO THAUM MUS PW NOJ 1 LUB. PAB ZOO NTSHAV MUAJ ROJ 90 tablet 3    semaglutide (OZEMPIC) 2 MG/3ML pen Inject 0.5 mg Subcutaneous every 7 days 3 mL 0    Semaglutide, 1 MG/DOSE, (OZEMPIC) 4 MG/3ML pen Inject 1 mg Subcutaneous every 7 days 3 mL 0    senna-docusate (SENOKOT-S/PERICOLACE) 8.6-50 MG tablet Take 2 tablets by mouth daily      sodium chloride 0.65 % nasal spray Spray 1-2 sprays in nostril      traZODone (DESYREL) 50 MG tablet Take 100 mg by mouth at bedtime      vitamin D2 (ERGOCALCIFEROL) 43683 units (1250 mcg) capsule Take 1 capsule (50,000 Units) by mouth once a week 12 capsule 5         PE: No  acute distress on video visit.  Alert and oriented.  He is nondyspneic appearing speaking full sentences.        TREATMENT: None.      ASSESSMENT: Diarrhea, blood in stool, abdominal pain.  Etiology is unclear but patient requires urgent follow-up which which was discussed with patient.  I instructed him to report to the emergency department this morning.  He is describing transportation problems.  I told him to arrange this ASAP today.      DIAGNOSIS: Diarrhea.  Hematochezia      PLAN: Patient is directed to come to the emergency department ASAP this morning.

## 2024-06-26 NOTE — PATIENT INSTRUCTIONS
Please return or send the stool sample.  We will check for a few infections, including ones that are common after antibiotics. [facility] but if treatment is indicated, someone will call you. If they are negative for infection, it is ok to Imodium with dosing on the bottle. I would then like you to see GI for follow up

## 2024-06-27 ENCOUNTER — MEDICAL CORRESPONDENCE (OUTPATIENT)
Dept: HEALTH INFORMATION MANAGEMENT | Facility: CLINIC | Age: 34
End: 2024-06-27

## 2024-06-28 ENCOUNTER — OFFICE VISIT (OUTPATIENT)
Dept: PODIATRY | Facility: CLINIC | Age: 34
End: 2024-06-28
Payer: COMMERCIAL

## 2024-06-28 VITALS
HEART RATE: 82 BPM | DIASTOLIC BLOOD PRESSURE: 103 MMHG | RESPIRATION RATE: 12 BRPM | SYSTOLIC BLOOD PRESSURE: 139 MMHG | OXYGEN SATURATION: 100 %

## 2024-06-28 DIAGNOSIS — S82.892A ANKLE FRACTURE, LEFT, CLOSED, INITIAL ENCOUNTER: Primary | ICD-10-CM

## 2024-06-28 DIAGNOSIS — M21.372 ACQUIRED LEFT FOOT DROP: ICD-10-CM

## 2024-06-28 LAB

## 2024-06-28 PROCEDURE — 99203 OFFICE O/P NEW LOW 30 MIN: CPT | Performed by: PODIATRIST

## 2024-06-28 ASSESSMENT — PAIN SCALES - GENERAL: PAINLEVEL: NO PAIN (0)
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
## 2024-06-30 ENCOUNTER — HEALTH MAINTENANCE LETTER (OUTPATIENT)
Age: 34
End: 2024-06-30

## 2024-07-03 ENCOUNTER — TELEPHONE (OUTPATIENT)
Dept: FAMILY MEDICINE | Facility: CLINIC | Age: 34
End: 2024-07-03

## 2024-07-03 DIAGNOSIS — K59.1 FUNCTIONAL DIARRHEA: Primary | ICD-10-CM

## 2024-07-03 RX ORDER — LOPERAMIDE HYDROCHLORIDE 2 MG/1
2 TABLET ORAL 4 TIMES DAILY PRN
Qty: 30 TABLET | Refills: 3 | Status: SHIPPED | OUTPATIENT
Start: 2024-07-03

## 2024-07-03 NOTE — TELEPHONE ENCOUNTER
Patient returned call--message from Dr. Nicolas was relayed to patient. He verbalized understanding and denied questions.

## 2024-07-03 NOTE — TELEPHONE ENCOUNTER
Patient was seen last week in [facility] for diarrhea. Stool test done at [facility] on 6/26 were negative.     At the time patient states he was unsure and does not remember cause of diarrhea. Says he remembers now, and states every time he eats spicy foods, his feels his intestines becomes inflamed and he gets diarrhea from it.     Patient did not receive anything for diarrhea. Patient was wondering does provider have any recommendation of medication patient can take when he has these diarrheal episodes (patient states only happens when he eats spicy foods).         Harvinder Bhat, MSN, RN   [facility]

## 2024-07-03 NOTE — TELEPHONE ENCOUNTER
Pacheco has neurogenic bowel. He should avoid excessive use of antidiarrheal medications. But if he needs to use one occasionally, it's ok to use loperamide.    Pacheco was seen today for medication request.    Diagnoses and all orders for this visit:    Functional diarrhea  -     loperamide (IMODIUM A-D) 2 MG tablet; Take 1 tablet (2 mg) by mouth 4 times daily as needed for diarrhea

## 2024-07-03 NOTE — TELEPHONE ENCOUNTER
Outgoing call to relay provider information. [facility]     Lisy MURCIA RN

## 2024-07-05 PROBLEM — S82.892A ANKLE FRACTURE, LEFT, CLOSED, INITIAL ENCOUNTER: Status: RESOLVED | Noted: 2024-06-28 | Resolved: 2024-07-05

## 2024-07-05 PROBLEM — R27.0 ATAXIA: Status: RESOLVED | Noted: 2018-05-30 | Resolved: 2024-07-05

## 2024-07-05 PROBLEM — R29.898 WEAKNESS OF BOTH LOWER EXTREMITIES: Status: ACTIVE | Noted: 2024-06-28

## 2024-07-08 ENCOUNTER — ALLIED HEALTH/NURSE VISIT (OUTPATIENT)
Dept: FAMILY MEDICINE | Facility: CLINIC | Age: 34
End: 2024-07-08
Payer: COMMERCIAL

## 2024-07-08 ENCOUNTER — TELEPHONE (OUTPATIENT)
Dept: FAMILY MEDICINE | Facility: CLINIC | Age: 34
End: 2024-07-08

## 2024-07-08 DIAGNOSIS — G95.0 SYRINGOMYELIA (H): ICD-10-CM

## 2024-07-08 DIAGNOSIS — Q07.01 ARNOLD-CHIARI MALFORMATION, TYPE II (H): ICD-10-CM

## 2024-07-08 DIAGNOSIS — Z98.2 S/P VENTRICULAR SHUNT PLACEMENT: Chronic | ICD-10-CM

## 2024-07-08 DIAGNOSIS — Z23 ENCOUNTER FOR IMMUNIZATION: Primary | ICD-10-CM

## 2024-07-08 DIAGNOSIS — N31.9 NEUROGENIC BLADDER: Primary | ICD-10-CM

## 2024-07-08 PROCEDURE — 99207 PR NO CHARGE NURSE ONLY: CPT

## 2024-07-08 PROCEDURE — 96372 THER/PROPH/DIAG INJ SC/IM: CPT | Performed by: FAMILY MEDICINE

## 2024-07-08 RX ADMIN — CYANOCOBALAMIN 1000 MCG: 1000 INJECTION, SOLUTION INTRAMUSCULAR; SUBCUTANEOUS at 11:40

## 2024-07-08 NOTE — TELEPHONE ENCOUNTER
----- Message from Lary Nicolas sent at 7/5/2024  6:51 PM CDT -----  Please touch base with Pacheco:    Did he get his urinary retention resolved (can he self-cath now)? And did he get follow up arranged with urology for urodynamics?    His blood tests showed he might have a condition where he bleeds too easy. So next time he comes in we'll do some additional tests for his easy bleeding/bruising. OK to schedule a lab only visit.

## 2024-07-09 ENCOUNTER — VIRTUAL VISIT (OUTPATIENT)
Dept: PHARMACY | Facility: CLINIC | Age: 34
End: 2024-07-09

## 2024-07-09 DIAGNOSIS — Z79.4 TYPE 2 DIABETES MELLITUS WITHOUT COMPLICATION, WITH LONG-TERM CURRENT USE OF INSULIN (H): ICD-10-CM

## 2024-07-09 DIAGNOSIS — E11.9 TYPE 2 DIABETES MELLITUS WITHOUT COMPLICATION, WITH LONG-TERM CURRENT USE OF INSULIN (H): ICD-10-CM

## 2024-07-09 PROCEDURE — 99207 PR NO CHARGE LOS: CPT | Mod: 93 | Performed by: PHARMACIST

## 2024-07-09 NOTE — TELEPHONE ENCOUNTER
Called patient, relayed provider message to patient. Patient replies below:  Yes patient can self-cath  No, unable to get a follow-up with urology---was only offered to see a nurse at urologist office--no follow-up appt was made   Lab only appt made for 8/9/24      Harvinder Bhat, MSN, RN   [facility]

## 2024-07-09 NOTE — TELEPHONE ENCOUNTER
Called patient and they picked up. Patient not able to schedule at the moment, asked to have us called back tomorrow. Attempt #1

## 2024-07-09 NOTE — PROGRESS NOTES
Medication Therapy Management (MTM) Encounter    ASSESSMENT:                            Medication Adherence/Access: No issues identified    Diabetes:   Since he has been off of the 1 mg dose for several weeks, will have him resume at the 1 mg and then increase to the 2 mg dose for further weight loss. When he increases to the 2 mg dose he can stop metformin.     PLAN:                            Resume Ozempic 1 mg weekly for 4 weeks then increase to 2 mg weekly. Stop metformin when you increase to 2 mg.     Follow-up: Return in about 3 months (around 10/9/2024) for Follow up with new MTM pharmacist.    SUBJECTIVE/OBJECTIVE:                          Pacheco David is a 33 year old male seen for a follow-up visit.       Reason for visit: diabetes follow-up .    Allergies/ADRs: Reviewed in chart  Past Medical History: Reviewed in chart  Tobacco: He reports that he has never smoked. He has never been exposed to tobacco smoke. He has never used smokeless tobacco.  Alcohol: none    Medication Adherence/Access: no issues reported    Diabetes   Ozempic 1 mg once weekly - reports the last time he took this was the middle of June. Needs refill but would like to increase to the 2 mg and stop metformin for help with weight loss as well as blood glucose control.   Metformin XL 1000 mg daily - has had some diarrhe issues with spicy food.     Patient is not experiencing side effects.  Blood sugar monitoring: reports blood glucose have come up above 200 since he has been off of ozempic.   Current diabetes symptoms: polydipsia, polyphagia, fatigue, and numbness/tingling  - no changes  Diet/Exercise: Reports he has been eating more - no changes    There is no height or weight on file to calculate BMI.      Lab Results   Component Value Date    UMALCR 14.53 03/27/2024      Lab Results   Component Value Date    A1C 5.9 06/14/2024    A1C 9.5 03/27/2024    A1C 7.4 04/07/2023    A1C 6.2 03/07/2022    A1C 5.6 07/17/2020                Eye exam  in the last 12 months? Yes- Date of last eye exam: June,  Location: Does not remember location  Foot exam is up to date      Today's Vitals: There were no vitals taken for this visit.  ----------------      I spent 7 minutes with this patient today. All changes were made via collaborative practice agreement with Lary Nicolas MD. A copy of the visit note was provided to the patient's provider(s).    A summary of these recommendations was sent via [employer].    Breezy Pathak, Pharm. D., [facility]  Medication Therapy Management Pharmacist      Telemedicine Visit Details  Type of service:  Telephone visit  Start Time:  2:32 pm  End Time:  2:39 pm     Medication Therapy Recommendations  Diabetes mellitus, type 2 (H)    Current Medication: Semaglutide, 1 MG/DOSE, (OZEMPIC) 4 MG/3ML pen   Rationale: Patient forgets to take - Adherence - Adherence   Recommendation: Provide Adherence Intervention   Status: Accepted per CPA

## 2024-07-10 ENCOUNTER — TELEPHONE (OUTPATIENT)
Dept: FAMILY MEDICINE | Facility: CLINIC | Age: 34
End: 2024-07-10
Payer: COMMERCIAL

## 2024-07-10 ENCOUNTER — TELEPHONE (OUTPATIENT)
Dept: UROLOGY | Facility: CLINIC | Age: 34
End: 2024-07-10
Payer: COMMERCIAL

## 2024-07-10 NOTE — TELEPHONE ENCOUNTER
Spoke to pt and  with urology,  informed pt that he would need to be seen in [place] due to his condition but pt states he prefers [place] as he cannot go to any other locations due to transportation.  sent message to [facility] to see if they are able to see pt and clinic will reach back out to pt.. Postpointing for now and will check in a few days to see if pt was able to schedule at [place].

## 2024-07-10 NOTE — TELEPHONE ENCOUNTER
[facility] Call Center    Phone Message    May a detailed message be left on voicemail: yes     Reason for Call: Other: Pt called in because he is wanting to establish care a [place] for neurogenic bladder. Writer informed pt that there was no provider at [place] location, and pt requested a call back to discuss further as [place] does not work for them due to transportation.      Action Taken: Message routed to:  [facility] urology    Travel Screening: Not Applicable     Date of Service:

## 2024-07-11 NOTE — PATIENT INSTRUCTIONS
"Recommendations from today's MTM visit:                                                    MTM (medication therapy management) is a service provided by a clinical pharmacist designed to help you get the most of out of your medicines.   Today we reviewed what your medicines are for, how to know if they are working, that your medicines are safe and how to make your medicine regimen as easy as possible.    Resume Ozempic 1 mg weekly for 4 weeks then increase to 2 mg weekly. Stop metformin when you increase to 2 mg.     Follow-up: Return in about 3 months (around 10/9/2024) for Follow up with new MTM pharmacist.    It was great speaking with you today.  I value your experience and would be very thankful for your time in providing feedback in our clinic survey. In the next few days, you may receive an email or text message from [employer] with a link to a survey related to your  clinical pharmacist.\"     To schedule another MTM appointment, please call the clinic directly or you may call the MTM scheduling line at [phone] or toll-free at [phone].     My Clinical Pharmacist's contact information:                                                      Please feel free to contact me with any questions or concerns you have.      Breezy Pathak, Pharm. D., [facility]  Medication Therapy Management Pharmacist    "

## 2024-07-11 NOTE — TELEPHONE ENCOUNTER
Advised pt no other location to establish care but can discuss with his insurance for medical rides or other transportation methods. Also advised he could talk with [place] [facility] to see if they have a closer location, I believe they have a [place] location but unsure if he can be seen by them.

## 2024-07-15 DIAGNOSIS — Z91.09 OTHER ALLERGY, OTHER THAN TO MEDICINAL AGENTS: Chronic | ICD-10-CM

## 2024-07-15 RX ORDER — LORATADINE 10 MG/1
TABLET ORAL
Qty: 90 TABLET | Refills: 2 | Status: SHIPPED | OUTPATIENT
Start: 2024-07-15

## 2024-07-19 ENCOUNTER — PATIENT OUTREACH (OUTPATIENT)
Dept: CARE COORDINATION | Facility: CLINIC | Age: 34
End: 2024-07-19
Payer: COMMERCIAL

## 2024-07-19 NOTE — PROGRESS NOTES
Clinic Care Coordination Contact:  Community Health Worker Follow Up    Care Gaps:   Health Maintenance Due   Topic Date Due    MEDICARE ANNUAL WELLNESS VISIT  07/17/2021    URINE DRUG SCREEN  04/07/2024     Patient agreed to review/discuss completion with PCP at next office visit or annual wellness visit on 8/29/2024 with Dr. Nicolas in [facility].     Care Plan:   Care Plan: Transportation       Problem: Lack of transportation       Goal: Establish reliable transportation       Start Date: 6/18/2024    This Visit's Progress: 20% Recent Progress: 10%    Note:     Barriers: N/A  Strengths: Family support, waiver services  Patient expressed understanding of goal: Yes    Action steps to achieve this goal:  1. I will wait for the metro mobility to be mailed to my home and have my family assist me with completing the document if needed. (Updated: 7/19/2024)- resend week of 7/23/2024   2. I will reschedule my upcoming appts that I am unable to currently make due to transportation concerns and connect with my family for their support for appointments that they can help me get to. (Updated: 7/19/2024)- continues  3. I will connect with CCC team at least once a month to discuss goal progression and address any other needs/concerns that may arise. (Updated: 7/19/2024)- continues  4. I will update CHW once received the metro mobility application from [facility] team via mail. (Updated: 7/19/2024)  5. I will wait to hear from [facility] team regards to transportation service info. (Updated: 7/19/2024)                            Patient Outreach Discussion:   [facility] CHW was able to connect with patient today regard to reason(s) above. Check in how patient is doing and any questions or concerns at this time. Patient shared info below. No additional CCC needs identified during the time of call. Reviewed that the patient can reach back out to [facility] with any questions or concerns at this time.    Patient shared:  -Transportation: Call and spoke with my health  "care insurance insurance which \"[employer]\" and no medical transportation service available.  -Family member work and not available every time.   -Doing good.   -No other questions.      CHW suggested patient for the following:  -Pt connect CCC/CHW with any additional resources need and PCP office for scheduling appt.     Appt reminder:   Patient is reminded to appt date(s) below. Pt confirmed that he will coordinate with his family member (dad and brother) to bring him to the appt(s). CHW suggested pt to connect with [facility] for any scheduling appt questions or concerns. Pt confirmed.   Name: Pacheco David MRN: [id number]     Date: 8/9/2024 Status: Scheduled     Time: 10:00 AM Length: 30     Visit Type: MA VISIT [8532] Copay: $0.00     [facility] Department: [facility] FAMILY MEDICINE/OB       Notes: b12 shot     Name: Pacheco David MRN: [id number]     Date: 8/9/2024 Status: Scheduled     Time: 10:45 AM Length: 15     Visit Type: LAB [930] Copay: $0.00     Provider: [facility] LAB Department: [facility] LABORATORY       Notes: lab orders per Dr. Nicolas     Name: Pacheco David MRN: [id number]     Date: 8/29/2024 Status: Scheduled     Time: 2:20 PM Length: 40     Visit Type: ANNUAL WELLNESS [2528] Copay: $0.00     Provider: Lary Nicolas MD Department: [facility] FAMILY MEDICINE/OB       Notes: AWV; (discuss due care gaps completion per pt agreed 7/19)     CHW Next Follow-up: Goal's follow up. Informed patient of due care gaps (if any).     Outreach frequency: monthly      CHW Plan:   -CHW coordinate with [facility] CCC pod on 7/23/2024 seeking other way to support the pt for transportation service to medical appt per pt request.  -CHW team send [employer] mobility application to the patient.   -Next CHW outreach plan: 1 month to monitor the progression of their goal(s).        "

## 2024-07-23 ENCOUNTER — TELEPHONE (OUTPATIENT)
Dept: FAMILY MEDICINE | Facility: CLINIC | Age: 34
End: 2024-07-23
Payer: COMMERCIAL

## 2024-07-23 ENCOUNTER — PATIENT OUTREACH (OUTPATIENT)
Dept: CARE COORDINATION | Facility: CLINIC | Age: 34
End: 2024-07-23
Payer: COMMERCIAL

## 2024-07-23 DIAGNOSIS — R25.2 SPASTICITY: Primary | ICD-10-CM

## 2024-07-23 DIAGNOSIS — R25.2 SPASTICITY: ICD-10-CM

## 2024-07-23 DIAGNOSIS — Z74.09 IMPAIRED MOBILITY: ICD-10-CM

## 2024-07-23 RX ORDER — BACLOFEN 10 MG/1
10 TABLET ORAL 3 TIMES DAILY
Qty: 100 TABLET | Refills: 11 | Status: SHIPPED | OUTPATIENT
Start: 2024-07-23 | End: 2024-08-30

## 2024-07-23 NOTE — PROGRESS NOTES
"Clinic Care Coordination Contact  Community Health Worker Follow Up    Reason: CCC CHW Follow Up Called (CHW Plan encounter dated 7/19/2024 and CCC SW encounter dated 7/23/2024)    Care Gaps:   Health Maintenance Due   Topic Date Due    MEDICARE ANNUAL WELLNESS VISIT  07/17/2021    URINE DRUG SCREEN  04/07/2024     Please see CHW notes encounter dated 7/19/2024 for details if need.    Care Plan:   Care Plan: Transportation       Problem: Lack of transportation       Goal: Establish reliable transportation       Start Date: 6/18/2024    This Visit's Progress: 40% Recent Progress: 20%    Note:     Barriers: N/A  Strengths: Family support, waiver services  Patient expressed understanding of goal: Yes    Action steps to achieve this goal:  1. I will wait for the metro mobility to be mailed to my home and have my family assist me with completing the document if needed. (Updated: 7/23/2024)- resend week of 7/23/2024   2. I will reschedule my upcoming appts that I am unable to currently make due to transportation concerns and connect with my family for their support for appointments that they can help me get to. (Updated: 7/23/2024)- continues  3. I will connect with CCC team at least once a month to discuss goal progression and address any other needs/concerns that may arise. (Updated: 7/23/2024)- continues  4. I will update CHW once received the metro mobility application and MTM transportation resources (including steps by steps) from [facility] team via mail. (Updated: 7/23/2024)  5. I will wait to hear from [facility] team regards to transportation service info. (Completed: 7/23/2024)                            Patient Outreach Discussion:   [facility] CHW was able to connect with patient today regard to reason(s) above. Checked in with patient see if he connected with [employer] customer service line or MTM transportation service to set up medical transportation.     Patient confirmed \"[employer] customer service line.\" " "    CHW relayed [facility] status (FYI: SW encounter dated 7/23/2024) and MTM transportation service contact info details and steps (FYI: CHW encounter dated 7/19/2024) with patient. Suggested pt to connect MTM to set up future medical transportation service to medical appt. Pt confirmed.    Pt is aware writer isn't in the clinic today. CCC team will be in the office tomorrow, 7/24/2024 or 7/24/2024 and will send the [employer] Mobility application to the patient via mail. Pt agreed. Pt request MTM resources attached with application. CHW confirmed.     No additional CCC needs identified during the time of call. Reviewed that the patient can reach back out to [facility] with any questions or concerns at this time.    CHW suggested patient for the following:  -Pt connect CCC/CHW with any additional resources need and PCP office for scheduling appt.   -Pt contact MTM at \"[phone]\" to set up future medical transportation to attend medical appt and follow instructions.   -Pt update CHW once received the metro mobility application and MTM transportation resources info. NOTE: Pt agreed.    CHW Next Follow-up: Goal's follow up. Informed patient of due care gaps (if any).     Outreach frequency: monthly      CHW Plan:   -CCC team send MTM transportation resources and [employer] Mobility application to the patient via mail per patient request.  -[facility] with send MTM transportation resources and [employer] Mobility application to the patient via mail per patient request.  -Next CHW outreach plan: 1 month to monitor the progression of their goal(s).    "

## 2024-07-23 NOTE — PROGRESS NOTES
Clinic Care Coordination Contact    [facility] subscriber information:     This subscriber has eligibility for MA: Medical Assistance.  Elig Type DX: Disabled  Eligibility Begin Date: 12/01/2013  Eligibility End Date: --/--/----  This subscriber is eligible for the following service types: Medical Care ,  Chiropractic ,  Dental Care ,  Hospital ,  Hospital - Inpatient ,  Hospital - Outpatient ,  Emergency Services ,  Pharmacy ,  Professional (Physician) Visit - Office ,  Vision (Optometry) ,  Mental Health ,  Urgent Care      This subscriber has eligibility for QM: Qualified Medicare Beneficiary - Medical Assistance Covers Medicare Coinsurance and Deductibles Only.  Elig Type DQ: Disabled/QMB only  Eligibility Begin Date: 12/01/2013  Eligibility End Date: --/--/----  This subscriber is eligible for the following service types: Medical Care ,  Chiropractic ,  Hospital ,  Hospital - Inpatient ,  Hospital - Outpatient ,  Emergency Services ,  Professional (Physician) Visit - Office ,  Mental Health ,  Urgent Care    DAISHA Cross   Social Work Care Coordinator   [facility]  [phone]

## 2024-07-23 NOTE — PROGRESS NOTES
Clinic Care Coordination Contact:    Today's date: 7/23/2024    Coordinate Care:   CHW consulted with [facility] team with [facility] today regard to transportation service to medical appt per pt request below.     [facility] CHIO verified the pt health care insurance status through [place], and is currently active. (FYI: Please see [facility] CHIO [place] encounter dated 7/23/2024 for details if need).     [employer] care insurance transportation service is through [place].   [facility] team agreed with CHW Plan below.     MTM transportation service line per [employer] searched:         CHW Plan:   -CHW connect with patient again: Check in with patient see if he connected with [employer] customer service line, or [facility] regard to transportation service. Suggested pt to connect with [place] transportation. Relayed [facility] [place] care ride info above with patient.   -Send [employer] Mobility application to patient via mail.

## 2024-07-23 NOTE — TELEPHONE ENCOUNTER
DME (Durable Medical Equipment) Orders and Documentation  Orders Placed This Encounter   Procedures    Walker Order     The patient was assessed and it was determined the patient is in need of the following listed DME Supplies/Equipment. Please complete supporting documentation below to demonstrate medical necessity.

## 2024-07-23 NOTE — TELEPHONE ENCOUNTER
Order/Referral Request    Who is requesting: Patient    Orders being requested: Walker    Reason service is needed/diagnosis: Drop Foot and muscle weakness. Walker just broke.    When are orders needed by: ASAP    Has this been discussed with Provider: No    Does patient have a preference on a Group/Provider/Facility? [facility], phone [phone]    Does patient have an appointment scheduled?: No    Where to send orders:  Fax order to [facility]    Could we [facility] this information to you [facility] or would you prefer to receive a phone call?:   Patient would prefer a phone call   Okay to leave a detailed message?: Yes at Cell number on file:    Telephone Information:   Mobile [phone]   Mobile Not on file.

## 2024-07-24 NOTE — TELEPHONE ENCOUNTER
Patient called to inform that [facility] never received order. Writer faxed order today to [phone] per patient request.

## 2024-07-24 NOTE — TELEPHONE ENCOUNTER
Called patient. Unable to reach patient. Left a VM. If Patient calls back, please let pt know that orders has been re-faxed and for him to call [facility] to check on the orders before going to pickup.       Harvinder Bhat, MSN, RN   [facility]

## 2024-07-25 ENCOUNTER — MEDICAL CORRESPONDENCE (OUTPATIENT)
Dept: HEALTH INFORMATION MANAGEMENT | Facility: CLINIC | Age: 34
End: 2024-07-25
Payer: COMMERCIAL

## 2024-07-25 ENCOUNTER — TELEPHONE (OUTPATIENT)
Dept: FAMILY MEDICINE | Facility: CLINIC | Age: 34
End: 2024-07-25
Payer: COMMERCIAL

## 2024-07-25 NOTE — TELEPHONE ENCOUNTER
Certificate of Medical Necessity form for hospital bed completed and in my out box.   See also: documentation from office visit 6/14/24.

## 2024-07-26 NOTE — TELEPHONE ENCOUNTER
Writer called patient and relayed message below. Pt stated that the walker was delivered today.    Pt stated [facility] have questions:  Why does pt need medical mattress?  Reason?  Pt unable to recall third question. Will call back or send [employer] message when he remembers    Dr. Nicolas, please review and advise.    Varun MURILLO RN  [facility]

## 2024-07-26 NOTE — TELEPHONE ENCOUNTER
Form previously completed and placed in my out basket.  See other messages.  Also see documentation for multiple clinic notes.

## 2024-07-31 ENCOUNTER — NURSE TRIAGE (OUTPATIENT)
Dept: FAMILY MEDICINE | Facility: CLINIC | Age: 34
End: 2024-07-31
Payer: COMMERCIAL

## 2024-07-31 ENCOUNTER — PATIENT OUTREACH (OUTPATIENT)
Dept: CARE COORDINATION | Facility: CLINIC | Age: 34
End: 2024-07-31
Payer: COMMERCIAL

## 2024-07-31 ASSESSMENT — PATIENT HEALTH QUESTIONNAIRE - PHQ9
SUM OF ALL RESPONSES TO PHQ QUESTIONS 1-9: 7
SUM OF ALL RESPONSES TO PHQ QUESTIONS 1-9: 7
10. IF YOU CHECKED OFF ANY PROBLEMS, HOW DIFFICULT HAVE THESE PROBLEMS MADE IT FOR YOU TO DO YOUR WORK, TAKE CARE OF THINGS AT HOME, OR GET ALONG WITH OTHER PEOPLE: SOMEWHAT DIFFICULT

## 2024-07-31 NOTE — TELEPHONE ENCOUNTER
"Nurse Triage SBAR    Is this a 2nd Level Triage? YES, LICENSED PRACTITIONER REVIEW IS REQUIRED    Situation: Patient calling in with abdominal pain     Background:     Assessment: Patient said a few weeks ago he had a carbonated beverage and has been having abdominal pain in the middle of his stomach since then. Had some mild diarrhea but no fevers no chest pain and stomach pain does not radiate anywhere else. Patient said sometimes the pain comes and goes but nothing really makes it better. Eating spicy things or drinking carbonated beverages makes it worse     Protocol Recommended Disposition:   Call ADS/Go to ED/UCC Now (Or To Office with PCP Approval), See in Office Today    Recommendation: Please advise on disposition as patient said he does not have a ride today to be taken anywhere     Routed to provider    Does the patient meet one of the following criteria for ADS visit consideration? No    Loida Nelson RN      Reason for Disposition   MODERATE pain (e.g., interferes with normal activities) that comes and goes (cramps) lasts > 24 hours  (Exception: Pain with Vomiting or Diarrhea - see that Protocol.)   MILD TO MODERATE constant pain lasting > 2 hours    Answer Assessment - Initial Assessment Questions  1. LOCATION: \"Where does it hurt?\"       Abdominal pain middle of abdomin  2. RADIATION: \"Does the pain shoot anywhere else?\" (e.g., chest, back)      No   3. ONSET: \"When did the pain begin?\" (Minutes, hours or days ago)       Been going on few weeks   4. SUDDEN: \"Gradual or sudden onset?\"      Gradually getting worse   5. PATTERN \"Does the pain come and go, or is it constant?\"     - If it comes and goes: \"How long does it last?\" \"Do you have pain now?\"      (Note: Comes and goes means the pain is intermittent. It goes away completely between bouts.)     - If constant: \"Is it getting better, staying the same, or getting worse?\"       (Note: Constant means the pain never goes away completely; most serious " "pain is constant and gets worse.)       Constant   6. SEVERITY: \"How bad is the pain?\"  (e.g., Scale 1-10; mild, moderate, or severe)     - MILD (1-3): Doesn't interfere with normal activities, abdomen soft and not tender to touch.      - MODERATE (4-7): Interferes with normal activities or awakens from sleep, abdomen tender to touch.      - SEVERE (8-10): Excruciating pain, doubled over, unable to do any normal activities.        6  7. RECURRENT SYMPTOM: \"Have you ever had this type of stomach pain before?\" If Yes, ask: \"When was the last time?\" and \"What happened that time?\"         8. CAUSE: \"What do you think is causing the stomach pain?\"      Patient has carbonated beverage a few weeks ago and feels that this has caused the pain   9. RELIEVING/AGGRAVATING FACTORS: \"What makes it better or worse?\" (e.g., antacids, bending or twisting motion, bowel movement)      Nothing makes it better eating spicy or carbonated drinks makes it worse   10. OTHER SYMPTOMS: \"Do you have any other symptoms?\" (e.g., back pain, diarrhea, fever, urination pain, vomiting)       Mild  Diarrhea    Protocols used: Abdominal Pain - Male-A-[place]    "

## 2024-07-31 NOTE — PROGRESS NOTES
Care Coordination Clinician Chart Review    Situation: Patient chart reviewed by Care Coordinator.       Background: Care Coordination Program started: 6/14/2024. Initial assessment completed and patient-centered care plan(s) were developed with participation from patient. Lead CC handed patient off to CHW for continued outreaches.       Assessment: Per chart review, patient outreach completed by CC CHW on 07/23/2024.  Patient is actively working to accomplish goal(s). Patient's goal(s) appropriate and relevant at this time. Patient is not due for updated Plan of Care.  Assessments will be completed annually or as needed/with change of patient status.      Care Plan: Transportation       Problem: Lack of transportation       Goal: Establish reliable transportation       Start Date: 6/18/2024    This Visit's Progress: 40% Recent Progress: 20%    Note:     Barriers: N/A  Strengths: Family support, waiver services  Patient expressed understanding of goal: Yes    Action steps to achieve this goal:  1. I will wait for the [employer] mobility to be mailed to my home and have my family assist me with completing the document if needed. (Updated: 7/23/2024)- resend week of 7/23/2024   2. I will reschedule my upcoming appts that I am unable to currently make due to transportation concerns and connect with my family for their support for appointments that they can help me get to. (Updated: 7/23/2024)- continues  3. I will connect with CCC team at least once a month to discuss goal progression and address any other needs/concerns that may arise. (Updated: 7/23/2024)- continues  4. I will update CHW once received the [employer] mobility application and MTM transportation resources (including steps by steps) from [facility] team via mail. (Updated: 7/23/2024)  5. I will wait to hear from [facility] team regards to transportation service info. (Completed: 7/23/2024)                                 Plan/Recommendations: The patient will continue working  with Care Coordination to achieve goal(s) as above. CHW will continue outreaches at minimum every 30 days and will involve Lead CC as needed or if patient is ready to move to Maintenance. Lead CC will continue to monitor CHW outreaches and patient's progress to goal(s) every 6 weeks.     Plan of Care updated and sent to patient: Debbie Zaidi [facility]  Social Work Care [place]   [facility]   Phone: [phone]             Statement Selected

## 2024-08-01 ENCOUNTER — OFFICE VISIT (OUTPATIENT)
Dept: FAMILY MEDICINE | Facility: CLINIC | Age: 34
End: 2024-08-01
Payer: COMMERCIAL

## 2024-08-01 VITALS
HEART RATE: 99 BPM | DIASTOLIC BLOOD PRESSURE: 84 MMHG | TEMPERATURE: 97.6 F | SYSTOLIC BLOOD PRESSURE: 124 MMHG | OXYGEN SATURATION: 96 % | RESPIRATION RATE: 16 BRPM

## 2024-08-01 DIAGNOSIS — D69.9 BLEEDS EASILY (H): ICD-10-CM

## 2024-08-01 DIAGNOSIS — N30.00 ACUTE CYSTITIS WITHOUT HEMATURIA: ICD-10-CM

## 2024-08-01 DIAGNOSIS — M79.89 LOCALIZED SWELLING OF BOTH LOWER EXTREMITIES: ICD-10-CM

## 2024-08-01 DIAGNOSIS — R10.13 EPIGASTRIC PAIN: Primary | ICD-10-CM

## 2024-08-01 LAB
ALBUMIN SERPL BCG-MCNC: 4.8 G/DL (ref 3.5–5.2)
ALP SERPL-CCNC: 109 U/L (ref 40–150)
ALT SERPL W P-5'-P-CCNC: 79 U/L (ref 0–70)
ANION GAP SERPL CALCULATED.3IONS-SCNC: 12 MMOL/L (ref 7–15)
AST SERPL W P-5'-P-CCNC: 48 U/L (ref 0–45)
BILIRUB SERPL-MCNC: 0.3 MG/DL
BUN SERPL-MCNC: 13.8 MG/DL (ref 6–20)
CALCIUM SERPL-MCNC: 9.6 MG/DL (ref 8.8–10.4)
CHLORIDE SERPL-SCNC: 106 MMOL/L (ref 98–107)
CREAT SERPL-MCNC: 0.72 MG/DL (ref 0.67–1.17)
EGFRCR SERPLBLD CKD-EPI 2021: >90 ML/MIN/1.73M2
ERYTHROCYTE [DISTWIDTH] IN BLOOD BY AUTOMATED COUNT: 13.3 % (ref 10–15)
GLUCOSE SERPL-MCNC: 96 MG/DL (ref 70–99)
HCO3 SERPL-SCNC: 25 MMOL/L (ref 22–29)
HCT VFR BLD AUTO: 48.6 % (ref 40–53)
HGB BLD-MCNC: 15.4 G/DL (ref 13.3–17.7)
MCH RBC QN AUTO: 26.6 PG (ref 26.5–33)
MCHC RBC AUTO-ENTMCNC: 31.7 G/DL (ref 31.5–36.5)
MCV RBC AUTO: 84 FL (ref 78–100)
PLATELET # BLD AUTO: 405 10E3/UL (ref 150–450)
POTASSIUM SERPL-SCNC: 4.1 MMOL/L (ref 3.4–5.3)
PROT SERPL-MCNC: 8.7 G/DL (ref 6.4–8.3)
RBC # BLD AUTO: 5.8 10E6/UL (ref 4.4–5.9)
SODIUM SERPL-SCNC: 143 MMOL/L (ref 135–145)
WBC # BLD AUTO: 8.6 10E3/UL (ref 4–11)

## 2024-08-01 PROCEDURE — 85390 FIBRINOLYSINS SCREEN I&R: CPT | Mod: 90 | Performed by: PATHOLOGY

## 2024-08-01 PROCEDURE — 80053 COMPREHEN METABOLIC PANEL: CPT | Performed by: FAMILY MEDICINE

## 2024-08-01 PROCEDURE — 36415 COLL VENOUS BLD VENIPUNCTURE: CPT | Performed by: FAMILY MEDICINE

## 2024-08-01 PROCEDURE — 85247 CLOT FACTOR VIII MULTIMETRIC: CPT | Mod: 90 | Performed by: FAMILY MEDICINE

## 2024-08-01 PROCEDURE — 85245 CLOT FACTOR VIII VW RISTOCTN: CPT | Performed by: FAMILY MEDICINE

## 2024-08-01 PROCEDURE — 85240 CLOT FACTOR VIII AHG 1 STAGE: CPT | Performed by: FAMILY MEDICINE

## 2024-08-01 PROCEDURE — 99214 OFFICE O/P EST MOD 30 MIN: CPT | Performed by: FAMILY MEDICINE

## 2024-08-01 PROCEDURE — 85245 CLOT FACTOR VIII VW RISTOCTN: CPT | Mod: 90 | Performed by: FAMILY MEDICINE

## 2024-08-01 PROCEDURE — 85027 COMPLETE CBC AUTOMATED: CPT | Performed by: FAMILY MEDICINE

## 2024-08-01 PROCEDURE — 87086 URINE CULTURE/COLONY COUNT: CPT | Performed by: FAMILY MEDICINE

## 2024-08-01 PROCEDURE — 99000 SPECIMEN HANDLING OFFICE-LAB: CPT | Performed by: FAMILY MEDICINE

## 2024-08-01 PROCEDURE — 85397 CLOTTING FUNCT ACTIVITY: CPT | Mod: 90 | Performed by: FAMILY MEDICINE

## 2024-08-01 PROCEDURE — 87338 HPYLORI STOOL AG IA: CPT | Performed by: FAMILY MEDICINE

## 2024-08-01 PROCEDURE — 83520 IMMUNOASSAY QUANT NOS NONAB: CPT | Mod: 90 | Performed by: FAMILY MEDICINE

## 2024-08-01 PROCEDURE — 85246 CLOT FACTOR VIII VW ANTIGEN: CPT | Performed by: FAMILY MEDICINE

## 2024-08-01 ASSESSMENT — ENCOUNTER SYMPTOMS: EYE PAIN: 1

## 2024-08-01 NOTE — PROGRESS NOTES
"  Assessment & Plan     Epigastric pain  Differential diagnosis discussed, lab works included CBC, CMP ordered, trial of omeprazole discussed and prescribed.  - CBC with platelets; Future  - Comprehensive metabolic panel; Future  - Helicobacter pylori Antigen Stool; Future  - omeprazole (PRILOSEC) 20 MG DR capsule; Take 1 capsule (20 mg) by mouth daily  - CBC with platelets  - Comprehensive metabolic panel  - Helicobacter pylori Antigen Stool    Localized swelling of both lower extremities    - Compression Sleeve/Stocking Order for DME - ONLY FOR DME    Acute cystitis without hematuria    - Urine Culture    Bleeds easily (H24)    - Factor 8 assay  - Von Willebrand antigen  - VWF Activity with reflex to Ristocetin Cofactor Activity  - Von Willebrand Multimers  - Ristocetin cofactor  - VWF Collagen III Binding  - VWF GPIbM Activity  - von Willebrand Interpretation          BMI  Estimated body mass index is 44.39 kg/m  as calculated from the following:    Height as of 3/27/24: 1.676 m (5' 6\").    Weight as of 4/16/24: 124.7 kg (275 lb).   Weight management plan: Discussed healthy diet and exercise guidelines      Review of external notes as documented elsewhere in note  30 minutes spent by me on the date of the encounter doing chart review, review of outside records, review of test results, interpretation of tests, patient visit, and documentation     Subjective   Pacheco is a 33 year old, presenting for the following health issues:  Abdominal Pain (Pt stated abdominal pain started mid of July pt feel burning pain in middle abdominal area when he eat any food it really burns  ), Eye Problem (Pt stated notice both eyes are very water not sure why when it become watery its hard to see ), and Musculoskeletal Problem (Pt stated both ankles have been swelling a lot lately and not going doing down at all)      8/1/2024    11:08 AM   Additional Questions   Roomed by Hanny     Eye Problem     Musculoskeletal Problem    History " of Present Illness       Reason for visit:  Stomach pain    He eats 0-1 servings of fruits and vegetables daily.He consumes 1 sweetened beverage(s) daily.He exercises with enough effort to increase his heart rate 9 or less minutes per day.  He exercises with enough effort to increase his heart rate 3 or less days per week.   He is taking medications regularly.     He complains of abdominal pain, localized at the epigastric area, started around the second weeks of July, associated with burning sensation in the esophagus area.  No nausea or vomiting, no diarrhea or fever.  On review of systems does mention watery diarrhea, lower extremity swelling.      Review of Systems  Constitutional, HEENT, cardiovascular, pulmonary, gi and gu systems are negative, except as otherwise noted.      Objective    There were no vitals taken for this visit.  There is no height or weight on file to calculate BMI.  Physical Exam   GENERAL: alert and no distress  NECK: no adenopathy, no asymmetry, masses, or scars  RESP: lungs clear to auscultation - no rales, rhonchi or wheezes  CV: regular rate and rhythm, normal S1 S2, no S3 or S4, no murmur, click or rub, no peripheral edema  ABDOMEN: soft, nontender, no hepatosplenomegaly, no masses and bowel sounds normal  ABDOMEN: Minimal epigastric tenderness, no guarding or rebound, bowel sounds positive in all 4 quadrants.  MS: no gross musculoskeletal defects noted, no edema    Results for orders placed or performed in visit on 08/01/24   Factor 8 assay     Status: Normal   Result Value Ref Range    Factor 8 Assay 102 55 - 200 %   Von Willebrand antigen     Status: Normal   Result Value Ref Range    von Willebrand Factor Antigen 150 50 - 200 %    Narrative    The presence of Rheumatoid Factor may produce an overestimation of the test result.   VWF Activity with reflex to Ristocetin Cofactor Activity     Status: Normal   Result Value Ref Range    von Willebrand Factor Activity 120 50 - 180 %    CBC with platelets     Status: Normal   Result Value Ref Range    WBC Count 8.6 4.0 - 11.0 10e3/uL    RBC Count 5.80 4.40 - 5.90 10e6/uL    Hemoglobin 15.4 13.3 - 17.7 g/dL    Hematocrit 48.6 40.0 - 53.0 %    MCV 84 78 - 100 fL    MCH 26.6 26.5 - 33.0 pg    MCHC 31.7 31.5 - 36.5 g/dL    RDW 13.3 10.0 - 15.0 %    Platelet Count 405 150 - 450 10e3/uL   Comprehensive metabolic panel     Status: Abnormal   Result Value Ref Range    Sodium 143 135 - 145 mmol/L    Potassium 4.1 3.4 - 5.3 mmol/L    Carbon Dioxide (CO2) 25 22 - 29 mmol/L    Anion Gap 12 7 - 15 mmol/L    Urea Nitrogen 13.8 6.0 - 20.0 mg/dL    Creatinine 0.72 0.67 - 1.17 mg/dL    GFR Estimate >90 >60 mL/min/1.73m2    Calcium 9.6 8.8 - 10.4 mg/dL    Chloride 106 98 - 107 mmol/L    Glucose 96 70 - 99 mg/dL    Alkaline Phosphatase 109 40 - 150 U/L    AST 48 (H) 0 - 45 U/L    ALT 79 (H) 0 - 70 U/L    Protein Total 8.7 (H) 6.4 - 8.3 g/dL    Albumin 4.8 3.5 - 5.2 g/dL    Bilirubin Total 0.3 <=1.2 mg/dL   Helicobacter pylori Antigen Stool     Status: Normal   Result Value Ref Range    Helicobacter pylori Antigen Stool Negative Negative   Urine Culture     Status: None    Specimen: Urine, Midstream   Result Value Ref Range    Culture <10,000 CFU/mL Mixture of Urogenital Karina          Prior to immunization administration, verified patients identity using patient s name and date of birth. Please see Immunization Activity for additional information.     Screening Questionnaire for Adult Immunization    Are you sick today?   No   Do you have allergies to medications, food, a vaccine component or latex?   Yes   Have you ever had a serious reaction after receiving a vaccination?   No   Do you have a long-term health problem with heart, lung, kidney, or metabolic disease (e.g., diabetes), asthma, a blood disorder, no spleen, complement component deficiency, a cochlear implant, or a spinal fluid leak?  Are you on long-term aspirin therapy?   Don't Know   Do you have  cancer, leukemia, HIV/AIDS, or any other immune system problem?   No   Do you have a parent, brother, or sister with an immune system problem?   No   In the past 3 months, have you taken medications that affect  your immune system, such as prednisone, other steroids, or anticancer drugs; drugs for the treatment of rheumatoid arthritis, Crohn s disease, or psoriasis; or have you had radiation treatments?   No   Have you had a seizure, or a brain or other nervous system problem?   Don't Know   During the past year, have you received a transfusion of blood or blood    products, or been given immune (gamma) globulin or antiviral drug?   No   For women: Are you pregnant or is there a chance you could become       pregnant during the next month?   No   Have you received any vaccinations in the past 4 weeks?   No     Immunization questionnaire was positive for at least one answer.  Notified Dr Lopez.    This note was completed in part using a voice recognition software, any grammatical or context distortion are unintentional and inherent to the software.    Patient instructed to remain in clinic for 15 minutes afterwards, and to report any adverse reactions.     Screening performed by Hanny Shirley on 8/1/2024 at 11:13 AM.   Signed Electronically by: Kwame Lopez MD

## 2024-08-02 ENCOUNTER — APPOINTMENT (OUTPATIENT)
Dept: LAB | Facility: CLINIC | Age: 34
End: 2024-08-02
Payer: COMMERCIAL

## 2024-08-03 LAB — BACTERIA UR CULT: NORMAL

## 2024-08-05 LAB
FACT VIII ACT/NOR PPP: 102 % (ref 55–200)
VWF AG ACT/NOR PPP IA: 150 % (ref 50–200)
VWF:AC ACT/NOR PPP IA: 120 % (ref 50–180)

## 2024-08-06 LAB — H PYLORI AG STL QL IA: NEGATIVE

## 2024-08-06 NOTE — TELEPHONE ENCOUNTER
Waiting for [employer] to fax the whole form back as we are missing some questions on the form.

## 2024-08-06 NOTE — TELEPHONE ENCOUNTER
Completed form from [employer] for [employer] Jim[employer]nez is in my out box.    I feel like we've been going around in circles with his orders. I've filled out hospital bed forms and documented them in his chart notes. Can you please touch base with [employer] and see if there's anything else they need and get it to them, so we can close this out.

## 2024-08-06 NOTE — TELEPHONE ENCOUNTER
There are multiple messages on this patient. See the other one from the same date, then close this message so we don't have duplicate or related messages floating around.

## 2024-08-06 NOTE — TELEPHONE ENCOUNTER
I called and spoke to [employer] regarding the missing questionnaire. They faxed the form again. Placed in your in basket.

## 2024-08-08 LAB
SCANNED LAB RESULT: NORMAL
SCANNED LAB RESULT: NORMAL

## 2024-08-09 ENCOUNTER — MEDICAL CORRESPONDENCE (OUTPATIENT)
Dept: HEALTH INFORMATION MANAGEMENT | Facility: CLINIC | Age: 34
End: 2024-08-09

## 2024-08-09 ENCOUNTER — PATIENT OUTREACH (OUTPATIENT)
Dept: CARE COORDINATION | Facility: CLINIC | Age: 34
End: 2024-08-09

## 2024-08-09 ENCOUNTER — LAB (OUTPATIENT)
Dept: LAB | Facility: CLINIC | Age: 34
End: 2024-08-09
Payer: COMMERCIAL

## 2024-08-09 ENCOUNTER — ALLIED HEALTH/NURSE VISIT (OUTPATIENT)
Dept: FAMILY MEDICINE | Facility: CLINIC | Age: 34
End: 2024-08-09
Payer: COMMERCIAL

## 2024-08-09 DIAGNOSIS — E53.8 VITAMIN B12 DEFICIENCY (NON ANEMIC): Primary | ICD-10-CM

## 2024-08-09 DIAGNOSIS — E55.9 VITAMIN D DEFICIENCY: ICD-10-CM

## 2024-08-09 LAB — VIT D+METAB SERPL-MCNC: 15 NG/ML (ref 20–50)

## 2024-08-09 PROCEDURE — 82306 VITAMIN D 25 HYDROXY: CPT

## 2024-08-09 PROCEDURE — 99207 PR NO CHARGE NURSE ONLY: CPT

## 2024-08-09 PROCEDURE — 36415 COLL VENOUS BLD VENIPUNCTURE: CPT

## 2024-08-09 PROCEDURE — 96372 THER/PROPH/DIAG INJ SC/IM: CPT | Performed by: FAMILY MEDICINE

## 2024-08-09 RX ADMIN — CYANOCOBALAMIN 1000 MCG: 1000 INJECTION, SOLUTION INTRAMUSCULAR; SUBCUTANEOUS at 10:07

## 2024-08-09 NOTE — TELEPHONE ENCOUNTER
Dr. Nicolas --  [employer] is requesting the form you are filling out be faxed to them ASAP.     FAX: [phone].    Thank you,   JUN DonovanN RN  [facility]

## 2024-08-09 NOTE — TELEPHONE ENCOUNTER
Faxed and I called [employer] and they got everything they needed for the hospital bed. Closing encounter.

## 2024-08-09 NOTE — PROGRESS NOTES
Clinic Administered Medication Documentation      Injectable Medication Documentation    Is there an active order (written within the past 365 days, with administrations remaining, not ) in the chart? Yes.     Patient was given Cyanocobalamin (B-12). Prior to medication administration, verified patient's identity using patient s name and date of birth. Please see MAR and medication order for additional information. Patient instructed to remain in clinic for 15 minutes and report any adverse reaction to staff immediately.    Vial/Syringe: Single dose vial. Was entire vial of medication used? Yes  Was this medication supplied by the patient? No  Is this a medication the patient will need to receive again? No - not necessary to check for refills remaining.

## 2024-08-09 NOTE — TELEPHONE ENCOUNTER
"This is the third time I've received and filled out the same form.  Is there different info they need or are they not getting what we've sent them?  The form is in my out box.    For reference, see the office visit documentation from 6/14/24.  I filled out this same form on 6/8/24 and 7/25/24.  I see the completed form scanned in on 7/3/24 and 7/30/24, under document type \"Orders\".  "

## 2024-08-09 NOTE — PROGRESS NOTES
Clinic Care Coordination Contact  Community Health Worker Follow Up    Reason(s):   -CCC CHW Follow Up Called (current goal's follow up)  -Return patient call    Care Gaps:   Health Maintenance Due   Topic Date Due    MEDICARE ANNUAL WELLNESS VISIT  07/17/2021    URINE DRUG SCREEN  04/07/2024     Patient annual wellness visit appt schedule (prior to today's date)  on 8/29/2024 with Dr. Nicolas (PCP) in clinic. Pt agreed to review due care gaps completion.    Care Plan:   Care Plan: Transportation       Problem: Lack of transportation       Goal: Establish reliable transportation       Start Date: 6/18/2024    This Visit's Progress: 50% Recent Progress: 40%    Note:     Barriers: N/A  Strengths: Family support, waiver services  Patient expressed understanding of goal: Yes    Action steps to achieve this goal:  1. I will wait for the metro mobility to be mailed to my home and have my family assist me with completing the document if needed. (Resent week of 7/23/2024)- completed  2. I will reschedule my upcoming appts that I am unable to currently make due to transportation concerns and connect with my family for their support for appointments that they can help me get to. (Updated: 08/09/2024)- continues  3. I will connect with CCC team at least once a month to discuss goal progression and address any other needs/concerns that may arise. (Updated: 08/09/2024)- continues  4. I will update CHW once received the metro mobility application and MTM transportation resources (including steps by steps) from [facility] team via mail. (Completed: received application from CHW per pt on 8/09/2024)  5. I will wait to hear from [facility] team regards to transportation service info. (Completed: 7/23/2024)  6. I will fill out my part (patient section) of the [employer] Mob[employer] Application and drop off the application to my PCP in the [facility] week of 8/12/2024 for Dr. Nicolas (PCP) to fill out the physician section. (Updated:  "08/09/2024)                            Patient Outreach Discussion:   CHW received a voice message from the patient to assist with complete the [employer] application.     CHW was able to connect with patient today regard to reason(s) above. Check in how patient is doing and any questions or concerns at this time. Patient shared info below. CHW educated pt how to complete the [employer] application. Pt confirmed understand. No additional CCC needs identified during the time of call.    Patient shared:  -Update: I have connected with [employer]. Will need to upgrade plan in order to get eligible for transportation service. Means that I will need to change premium plan. Prefer to apply metro mobility transportation at this time.   -Received the [employer] Mob[employer] Application from W. Completed my part of the application. Next week, plan to drop it off to Dr. Nicolas to complete the the physician section.   -Doing well. No other questions.     Appt reminder:   Pt is reminded to montez date below. Pt confirmed.    Name: Pacheco David MRN: [id number]     Date: 8/29/2024 Status: Scheduled     Time: 2:20 PM Length: 40     Visit Type: ANNUAL WELLNESS [2528] Copay: $0.00     Provider: Lary Nicolas MD Department: [facility]       Notes: AWV; (discuss due care gaps completion per pt agreed 7/19)     CHW suggested patient for the following:  -Pt connect CCC/CHW with any additional resources need and PCP office for scheduling appt.   -Pt coordinate transportation service to future medical appt including appt on 8/29/2024 with his father for further assistance.     CHW Next Follow-up: Goal's follow up. Informed patient of due care gaps (if any).     Outreach frequency: monthly      CHW Plan:   -Pt attend \"AWV\" appt on 8/29 with Dr. Nicolas in [facility]. Pt coordinate transportation service with his father.   -Patient drop off the [employer] Mob[employer] application to PCP to fill out the physician section in the application, " "Or bring it to the pt \"AWV\" appt on 8/29/2024.   -Next CHW outreach plan: 1 month to monitor the progression of their goal(s).   "

## 2024-08-10 LAB — VWF:RCO ACT/NOR PPP PL AGG: 110 %

## 2024-08-12 LAB
VWF MULTIMERS PPP IB: NORMAL
VWF MULTIMERS PPP QL: NORMAL
VWF:RCO ACT/NOR PPP PL AGG: NORMAL %

## 2024-08-13 ENCOUNTER — TELEPHONE (OUTPATIENT)
Dept: FAMILY MEDICINE | Facility: CLINIC | Age: 34
End: 2024-08-13
Payer: COMMERCIAL

## 2024-08-13 LAB — VON WILLEBRAND EVAL PPP-IMP: NORMAL

## 2024-08-13 NOTE — TELEPHONE ENCOUNTER
Forms/Letter Request    Type of form/letter: Transportation ([employer])      Do we have the form/letter: Yes: 08/13/824    Who is the form from? Patient    Where did/will the form come from? Patient or family brought in       When is form/letter needed by: 08/22/24    How would you like the form/letter returned:     Patient Notified form requests are processed in 5-7 business days:Yes    Could we send this information to you in [employer] or would you prefer to receive a phone call?:   Patient would prefer a phone call   Okay to leave a detailed message?: Yes at Home number on file [phone] (home)    Patient requests all Lab and Radiology Results on their Discharge Instructions

## 2024-08-16 DIAGNOSIS — E55.9 VITAMIN D DEFICIENCY: ICD-10-CM

## 2024-08-16 RX ORDER — ERGOCALCIFEROL 1.25 MG/1
50000 CAPSULE ORAL
Qty: 6 CAPSULE | Refills: 4 | Status: SHIPPED | OUTPATIENT
Start: 2024-08-16 | End: 2024-08-23

## 2024-08-19 ENCOUNTER — TELEPHONE (OUTPATIENT)
Dept: FAMILY MEDICINE | Facility: CLINIC | Age: 34
End: 2024-08-19
Payer: COMMERCIAL

## 2024-08-19 ENCOUNTER — PATIENT OUTREACH (OUTPATIENT)
Dept: CARE COORDINATION | Facility: CLINIC | Age: 34
End: 2024-08-19
Payer: COMMERCIAL

## 2024-08-19 DIAGNOSIS — E55.9 VITAMIN D DEFICIENCY: Primary | ICD-10-CM

## 2024-08-19 NOTE — PROGRESS NOTES
Clinic Care Coordination Contact:  Community Health Worker Follow Up    Reason(s):   -Return call   -CCC CHW Follow Up Called (current goal's follow up)    Care Gaps:   Health Maintenance Due   Topic Date Due    MEDICARE ANNUAL WELLNESS VISIT  07/17/2021    URINE DRUG SCREEN  04/07/2024     Defer to next outreach due to last outreach discussion completed 8/09/2024.    Care Plan:   Care Plan: Transportation       Problem: Lack of transportation       Goal: Establish reliable transportation       Start Date: 6/18/2024    This Visit's Progress: 60% Recent Progress: 50%    Note:     Barriers: N/A  Strengths: Family support, waiver services  Patient expressed understanding of goal: Yes    Action steps to achieve this goal:  1. I will wait for the metro mobility to be mailed to my home and have my family assist me with completing the document if needed. (Resent week of 7/23/2024)- completed  2. I will reschedule my upcoming appts that I am unable to currently make due to transportation concerns and connect with my family for their support for appointments that they can help me get to. (Updated: 08/19/2024)- continues  3. I will connect with CCC team at least once a month to discuss goal progression and address any other needs/concerns that may arise. (Updated: 08/19/2024)- continues  4. I will update CHW once received the metro mobility application and MTM transportation resources (including steps by steps) from [facility] team via mail. (Completed: received application from CHW per pt on 8/09/2024)  5. I will wait to hear from [facility] team regards to transportation service info. (Completed: 7/23/2024)  6. I will fill out my part (patient section) of the [employer] Mob[employer] Application and drop off the application to my PCP in the [facility] week of 8/12/2024 for Dr. Nicolas (PCP) to fill out the physician section. (Completed)  7. I will wait to received a copy of the metro mobility form send to me via mail.  (Updated: 8/19/2024)  8. I will wait to hear from [facility] Mobility Office for the next step. (Updated: 8/19/2024)                            Patient Outreach Discussion:   [facility] CHW was able to connect with patient today regard to reason(s) above. Relayed telephone encounter dated 8/13/2024 with patient. Pt request CCC team assist with fax/send form to the [facility] Mobility Office and send a copy to the patient via mail. Updated current goal. Please see CHW Plan below for details if need.     Patient shared:   -Doing good. No other questions or concerns at this time.     CHW suggested patient for the following:  -Pt connect CCC/CHW with any additional resources need and PCP office for scheduling appt.     CHW Next Follow-up: Goal's follow up. Informed patient of due care gaps (if any).     Outreach frequency: monthly      CHW Plan:   -Week of 8/25/2024: CCC team send a copy of the [employer] Mob[employer] form to the patient via mail, [facility] and send original copy of the form to [facility] (in case the mail lost) per patient request. Send a copy to be scan into the pt chart.   -Next CHW outreach plan: 1 month to monitor the progression of their goal(s).

## 2024-08-19 NOTE — TELEPHONE ENCOUNTER
----- Message from Lary Nicolas sent at 8/16/2024  2:19 PM CDT -----  Please let the patient know that his vitamin D level is still low.  I want him to start taking the prescription vitamin D pill every 2 weeks.

## 2024-08-19 NOTE — PROGRESS NOTES
Clinic Care Coordination Contact:    Reason(s):   -Return call   -CCC CHW Follow Up Called (current goal's follow up)    Voicemail check:   CHW received a voice message from patient dated 8/19/2024 at 9:23AM today about metro mobility form. No details provide.     Chart reviewed:   [employer] mobility form completed and ready for  per telephone encounter dated 8/13/2024.    Plan:  Connect with patient regard to reason's above.

## 2024-08-19 NOTE — TELEPHONE ENCOUNTER
----- Message from Lary Nicolas sent at 8/16/2024  2:18 PM CDT -----  Please tell the patient that his blood tests for bleeding problems were normal including:  Von Willebrand's disease, factor VIII assay.

## 2024-08-23 ENCOUNTER — PATIENT OUTREACH (OUTPATIENT)
Dept: CARE COORDINATION | Facility: CLINIC | Age: 34
End: 2024-08-23
Payer: COMMERCIAL

## 2024-08-23 RX ORDER — CHOLECALCIFEROL (VITAMIN D3) 50 MCG
1 TABLET ORAL DAILY
Qty: 100 TABLET | Refills: 4 | Status: SHIPPED | OUTPATIENT
Start: 2024-08-23

## 2024-08-23 NOTE — TELEPHONE ENCOUNTER
Contacted patient and relayed message below.  Patient was not able to  Rx for ergocalciferol (ERGOCALCIFEROL) 1.25 MG (20362 UT) capsule as it was NOT covered by insurance.  The cost was $40.00 and patient unable to afford.  Patient wondering if there is another alternative medication that is covered?    Message routed to Dr Nicolas for review / plan    Francisca Baca RN  [facility]    ----- Message from Lary Nicolas sent at 8/16/2024  2:19 PM CDT -----  Please let the patient know that his vitamin D level is still low.  I want him to start taking the prescription vitamin D pill every 2 weeks.

## 2024-08-23 NOTE — PROGRESS NOTES
Clinic Care Coordination Contact:  Community Health Worker Follow Up    Today's date: 8/23/2024    Reason: Follow up CHW outreach encounter dated 8/19/2024 (regard: [employer] Application per patient request)    Care Gaps:   Health Maintenance Due   Topic Date Due    MEDICARE ANNUAL WELLNESS VISIT  07/17/2021    URINE DRUG SCREEN  04/07/2024     Defer to next outreach follow up.    Care Plan:   Care Plan: Transportation       Problem: Lack of transportation       Goal: Establish reliable transportation       Start Date: 6/18/2024    This Visit's Progress: 60% Recent Progress: 60%    Note:     Barriers: N/A  Strengths: Family support, waiver services  Patient expressed understanding of goal: Yes    Action steps to achieve this goal:  1. I will wait for the metro mobility to be mailed to my home and have my family assist me with completing the document if needed. (Resent week of 7/23/2024)- completed  2. I will reschedule my upcoming appts that I am unable to currently make due to transportation concerns and connect with my family for their support for appointments that they can help me get to. (Updated: 08/19/2024)- continues  3. I will connect with CCC team at least once a month to discuss goal progression and address any other needs/concerns that may arise. (Updated: 08/23/2024)- continues  4. I will update CHW once received the metro mobility application and MTM transportation resources (including steps by steps) from [facility] team via mail. (Completed: received application from CHW per pt on 8/09/2024)  5. I will wait to hear from [facility] team regards to transportation service info. (Completed: 7/23/2024)  6. I will fill out my part (patient section) of the [employer] Mob[employer] Application and drop off the application to my PCP in the [facility] week of 8/12/2024 for Dr. Nicolas (PCP) to fill out the physician section. (Completed)  7. I will wait to received a copy of the metro mobility form send to  me via mail. (Updated: 8/23/2024)- continues  8. I will wait to hear from [facility] for the next step. (Updated: 8/23/2024)- continues                            Coordinate Care:   -[employer] Mobility application (original) completed by the patient pcp placed in the patient  box located in the front (check in) station sent to address listed on the application- [facility] address: [address] [place] [address].  -Copy of the [employer] Mobility sent to patient via mail per patient request.   -Copy of the metro mobility application sent to scanning to be scan into the pt chart per pt agreed at last CHW outreach.     Patient Outreach Discussion:   CHW was able to connect with patient. Pt is aware of coordinate care details above. Pt questions CHW length of time to hear from [employer] regards to approval. CHW suggested pt to wait about 2-4 weeks to hear from them. If hasn't, pt may follow up with [facility] phone number located on the application for further questions, and follow instructions. Pt confirmed and thank CHW and PCP team.     CHW suggested patient for the following:  -Pt connect CCC/CHW with any additional resources need and PCP office for scheduling appt.   -Patient wait to hear from [facility] for the next step. Pt follow instructions.     CHW Next Follow-up: Goal's follow up. Informed patient of due care gaps (if any).     Outreach frequency: monthly      CHW Plan:   -Met[employer] Mob[employer] Application status: Patient wait to hear from [facility] for the next step.   -Next CHW outreach plan: 1 month to monitor the progression of their goal(s).

## 2024-08-23 NOTE — TELEPHONE ENCOUNTER
I looked at his chart and it looks like nothing is covered for vitamin D. I'd like him to take the over the counter 2000 unit pill, instead because it will be less expensive.

## 2024-08-29 ENCOUNTER — OFFICE VISIT (OUTPATIENT)
Dept: FAMILY MEDICINE | Facility: CLINIC | Age: 34
End: 2024-08-29
Payer: COMMERCIAL

## 2024-08-29 ENCOUNTER — TELEPHONE (OUTPATIENT)
Dept: FAMILY MEDICINE | Facility: CLINIC | Age: 34
End: 2024-08-29

## 2024-08-29 VITALS
OXYGEN SATURATION: 97 % | SYSTOLIC BLOOD PRESSURE: 125 MMHG | HEART RATE: 92 BPM | RESPIRATION RATE: 20 BRPM | TEMPERATURE: 97.9 F | DIASTOLIC BLOOD PRESSURE: 89 MMHG

## 2024-08-29 DIAGNOSIS — M79.2 NEUROPATHIC PAIN: ICD-10-CM

## 2024-08-29 DIAGNOSIS — Z11.59 ENCOUNTER FOR SCREENING FOR OTHER VIRAL DISEASES: ICD-10-CM

## 2024-08-29 DIAGNOSIS — B00.52 HERPES KERATITIS OF LEFT EYE: ICD-10-CM

## 2024-08-29 DIAGNOSIS — E53.8 VITAMIN B12 DEFICIENCY (NON ANEMIC): ICD-10-CM

## 2024-08-29 DIAGNOSIS — S34.3XXS CAUDA EQUINA SPINAL CORD INJURY, SEQUELA: ICD-10-CM

## 2024-08-29 DIAGNOSIS — Z00.00 ENCOUNTER FOR MEDICARE ANNUAL WELLNESS EXAM: Primary | ICD-10-CM

## 2024-08-29 DIAGNOSIS — F32.2 MAJOR DEPRESSIVE DISORDER, SINGLE EPISODE, SEVERE (H): ICD-10-CM

## 2024-08-29 LAB
ALBUMIN SERPL BCG-MCNC: 4.6 G/DL (ref 3.5–5.2)
ALP SERPL-CCNC: 79 U/L (ref 40–150)
ALT SERPL W P-5'-P-CCNC: 44 U/L (ref 0–70)
ANION GAP SERPL CALCULATED.3IONS-SCNC: 13 MMOL/L (ref 7–15)
AST SERPL W P-5'-P-CCNC: 29 U/L (ref 0–45)
BILIRUB SERPL-MCNC: 0.4 MG/DL
BUN SERPL-MCNC: 13.1 MG/DL (ref 6–20)
CALCIUM SERPL-MCNC: 9.4 MG/DL (ref 8.8–10.4)
CHLORIDE SERPL-SCNC: 103 MMOL/L (ref 98–107)
CREAT SERPL-MCNC: 0.64 MG/DL (ref 0.67–1.17)
CRP SERPL-MCNC: 4.6 MG/L
EGFRCR SERPLBLD CKD-EPI 2021: >90 ML/MIN/1.73M2
ERYTHROCYTE [DISTWIDTH] IN BLOOD BY AUTOMATED COUNT: 13.2 % (ref 10–15)
ERYTHROCYTE [SEDIMENTATION RATE] IN BLOOD BY WESTERGREN METHOD: 44 MM/HR (ref 0–15)
GLUCOSE SERPL-MCNC: 91 MG/DL (ref 70–99)
HCO3 SERPL-SCNC: 24 MMOL/L (ref 22–29)
HCT VFR BLD AUTO: 45 % (ref 40–53)
HCV AB SERPL QL IA: NONREACTIVE
HGB BLD-MCNC: 14.5 G/DL (ref 13.3–17.7)
MCH RBC QN AUTO: 26.2 PG (ref 26.5–33)
MCHC RBC AUTO-ENTMCNC: 32.2 G/DL (ref 31.5–36.5)
MCV RBC AUTO: 81 FL (ref 78–100)
PLATELET # BLD AUTO: 354 10E3/UL (ref 150–450)
POTASSIUM SERPL-SCNC: 3.8 MMOL/L (ref 3.4–5.3)
PROT SERPL-MCNC: 7.9 G/DL (ref 6.4–8.3)
RBC # BLD AUTO: 5.54 10E6/UL (ref 4.4–5.9)
SODIUM SERPL-SCNC: 140 MMOL/L (ref 135–145)
VIT B12 SERPL-MCNC: 275 PG/ML (ref 232–1245)
WBC # BLD AUTO: 6 10E3/UL (ref 4–11)

## 2024-08-29 PROCEDURE — 85652 RBC SED RATE AUTOMATED: CPT | Performed by: FAMILY MEDICINE

## 2024-08-29 PROCEDURE — 86695 HERPES SIMPLEX TYPE 1 TEST: CPT | Performed by: FAMILY MEDICINE

## 2024-08-29 PROCEDURE — 36415 COLL VENOUS BLD VENIPUNCTURE: CPT | Performed by: FAMILY MEDICINE

## 2024-08-29 PROCEDURE — 96127 BRIEF EMOTIONAL/BEHAV ASSMT: CPT | Performed by: FAMILY MEDICINE

## 2024-08-29 PROCEDURE — 86803 HEPATITIS C AB TEST: CPT | Performed by: FAMILY MEDICINE

## 2024-08-29 PROCEDURE — 99395 PREV VISIT EST AGE 18-39: CPT | Performed by: FAMILY MEDICINE

## 2024-08-29 PROCEDURE — 80053 COMPREHEN METABOLIC PANEL: CPT | Performed by: FAMILY MEDICINE

## 2024-08-29 PROCEDURE — 99215 OFFICE O/P EST HI 40 MIN: CPT | Mod: 25 | Performed by: FAMILY MEDICINE

## 2024-08-29 PROCEDURE — 82607 VITAMIN B-12: CPT | Performed by: FAMILY MEDICINE

## 2024-08-29 PROCEDURE — 85027 COMPLETE CBC AUTOMATED: CPT | Performed by: FAMILY MEDICINE

## 2024-08-29 PROCEDURE — 86618 LYME DISEASE ANTIBODY: CPT | Performed by: FAMILY MEDICINE

## 2024-08-29 PROCEDURE — 86140 C-REACTIVE PROTEIN: CPT | Performed by: FAMILY MEDICINE

## 2024-08-29 PROCEDURE — 86696 HERPES SIMPLEX TYPE 2 TEST: CPT | Performed by: FAMILY MEDICINE

## 2024-08-29 RX ORDER — ARIPIPRAZOLE 2 MG/1
2 TABLET ORAL DAILY
Qty: 30 TABLET | Refills: 0 | Status: SHIPPED | OUTPATIENT
Start: 2024-08-29 | End: 2024-10-03

## 2024-08-29 SDOH — HEALTH STABILITY: PHYSICAL HEALTH: ON AVERAGE, HOW MANY DAYS PER WEEK DO YOU ENGAGE IN MODERATE TO STRENUOUS EXERCISE (LIKE A BRISK WALK)?: 1 DAY

## 2024-08-29 ASSESSMENT — SOCIAL DETERMINANTS OF HEALTH (SDOH): HOW OFTEN DO YOU GET TOGETHER WITH FRIENDS OR RELATIVES?: NEVER

## 2024-08-29 ASSESSMENT — PATIENT HEALTH QUESTIONNAIRE - PHQ9
SUM OF ALL RESPONSES TO PHQ QUESTIONS 1-9: 12
10. IF YOU CHECKED OFF ANY PROBLEMS, HOW DIFFICULT HAVE THESE PROBLEMS MADE IT FOR YOU TO DO YOUR WORK, TAKE CARE OF THINGS AT HOME, OR GET ALONG WITH OTHER PEOPLE: VERY DIFFICULT
SUM OF ALL RESPONSES TO PHQ QUESTIONS 1-9: 12

## 2024-08-29 NOTE — TELEPHONE ENCOUNTER
Please call the patient's pain doctor, Dr. Chu.  His pain is severe and uncontrolled. He is feeling suicidal.

## 2024-08-29 NOTE — TELEPHONE ENCOUNTER
Please call Dr. Rojas, patient's neurologist.    He is having headache (especially when he wakes up), neck stiffness with pain with movement. He's worried about a shunt infection and wonders if he needs an LP.    Could he get him earlier? Or would he like me to order a LP with IR (if so, what labs does he want on CSF)?

## 2024-08-29 NOTE — TELEPHONE ENCOUNTER
Could you touch base with [employer] to see if there is any way he can get OTC medication coverage?  If he doesn't have care coordination yet, OK to enroll.

## 2024-08-29 NOTE — PATIENT INSTRUCTIONS
Patient Education   Preventive Care Advice   This is general advice given by our system to help you stay healthy. However, your care team may have specific advice just for you. Please talk to your care team about your preventive care needs.  Nutrition  Eat 5 or more servings of fruits and vegetables each day.  Try wheat bread, brown rice and whole grain pasta (instead of white bread, rice, and pasta).  Get enough calcium and vitamin D. Check the label on foods and aim for 100% of the RDA (recommended daily allowance).  Lifestyle  Exercise at least 150 minutes each week  (30 minutes a day, 5 days a week).  Do muscle strengthening activities 2 days a week. These help control your weight and prevent disease.  No smoking.  Wear sunscreen to prevent skin cancer.  Have a dental exam and cleaning every 6 months.  Yearly exams  See your health care team every year to talk about:  Any changes in your health.  Any medicines your care team has prescribed.  Preventive care, family planning, and ways to prevent chronic diseases.  Shots (vaccines)   HPV shots (up to age 26), if you've never had them before.  Hepatitis B shots (up to age 59), if you've never had them before.  COVID-19 shot: Get this shot when it's due.  Flu shot: Get a flu shot every year.  Tetanus shot: Get a tetanus shot every 10 years.  Pneumococcal, hepatitis A, and RSV shots: Ask your care team if you need these based on your risk.  Shingles shot (for age 50 and up)  General health tests  Diabetes screening:  Starting at age 35, Get screened for diabetes at least every 3 years.  If you are younger than age 35, ask your care team if you should be screened for diabetes.  Cholesterol test: At age 39, start having a cholesterol test every 5 years, or more often if advised.  Bone density scan (DEXA): At age 50, ask your care team if you should have this scan for osteoporosis (brittle bones).  Hepatitis C: Get tested at least once in your life.  STIs (sexually  transmitted infections)  Before age 24: Ask your care team if you should be screened for STIs.  After age 24: Get screened for STIs if you're at risk. You are at risk for STIs (including HIV) if:  You are sexually active with more than one person.  You don't use condoms every time.  You or a partner was diagnosed with a sexually transmitted infection.  If you are at risk for HIV, ask about PrEP medicine to prevent HIV.  Get tested for HIV at least once in your life, whether you are at risk for HIV or not.  Cancer screening tests  Cervical cancer screening: If you have a cervix, begin getting regular cervical cancer screening tests starting at age 21.  Breast cancer scan (mammogram): If you've ever had breasts, begin having regular mammograms starting at age 40. This is a scan to check for breast cancer.  Colon cancer screening: It is important to start screening for colon cancer at age 45.  Have a colonoscopy test every 10 years (or more often if you're at risk) Or, ask your provider about stool tests like a FIT test every year or Cologuard test every 3 years.  To learn more about your testing options, visit:   .  For help making a decision, visit:   https://bit.ly/kz32537.  Prostate cancer screening test: If you have a prostate, ask your care team if a prostate cancer screening test (PSA) at age 55 is right for you.  Lung cancer screening: If you are a current or former smoker ages 50 to 80, ask your care team if ongoing lung cancer screenings are right for you.  For informational purposes only. Not to replace the advice of your health care provider. Copyright   2023 [facility]. All rights reserved. Clinically reviewed by the [facility] Transitions Program. [employer]cit (ML4D) [id number] - REV 01/24.  Preventing Falls: Care Instructions  Injuries and health problems such as trouble walking or poor eyesight can increase your risk of falling. So can some medicines. But there are things you can do to help  "prevent falls. You can exercise to get stronger. You can also arrange your home to make it safer.    Talk to your doctor about the medicines you take. Ask if any of them increase the risk of falls and whether they can be changed or stopped.   Try to exercise regularly. It can help improve your strength and balance. This can help lower your risk of falling.     Practice fall safety and prevention.    Wear low-heeled shoes that fit well and give your feet good support. Talk to your doctor if you have foot problems that make this hard.  Carry a cellphone or wear a medical alert device that you can use to call for help.  Use stepladders instead of chairs to reach high objects. Don't climb if you're at risk for falls. Ask for help, if needed.  Wear the correct eyeglasses, if you need them.    Make your home safer.    Remove rugs, cords, clutter, and furniture from walkways.  Keep your house well lit. Use night-lights in hallways and bathrooms.  Install and use sturdy handrails on stairways.  Wear nonskid footwear, even inside. Don't walk barefoot or in socks without shoes.    Be safe outside.    Use handrails, curb cuts, and ramps whenever possible.  Keep your hands free by using a shoulder bag or backpack.  Try to walk in well-lit areas. Watch out for uneven ground, changes in pavement, and debris.  Be careful in the winter. Walk on the grass or gravel when sidewalks are slippery. Use de-icer on steps and walkways. Add non-slip devices to shoes.    Put grab bars and nonskid mats in your shower or tub and near the toilet. Try to use a shower chair or bath bench when bathing.   Get into a tub or shower by putting in your weaker leg first. Get out with your strong side first. Have a phone or medical alert device in the bathroom with you.   Where can you learn more?  Go to https://www.[employer].net/patiented  Enter G117 in the search box to learn more about \"Preventing Falls: Care Instructions.\"  Current as of: July 17, " 2023               Content Version: 14.0    [id number] [email or web address].   Care instructions adapted under license by your healthcare professional. If you have questions about a medical condition or this instruction, always ask your healthcare professional. [email or web address] disclaims any warranty or liability for your use of this information.      Hearing Loss: Care Instructions  Overview     Hearing loss is a sudden or slow decrease in how well you hear. It can range from slight to profound. Permanent hearing loss can occur with aging. It also can happen when you are exposed long-term to loud noise. Examples include listening to loud music, riding motorcycles, or being around other loud machines.  Hearing loss can affect your work and home life. It can make you feel lonely or depressed. You may feel that you have lost your independence. But hearing aids and other devices can help you hear better and feel connected to others.  Follow-up care is a key part of your treatment and safety. Be sure to make and go to all appointments, and call your doctor if you are having problems. It's also a good idea to know your test results and keep a list of the medicines you take.  How can you care for yourself at home?  Avoid loud noises whenever possible. This helps keep your hearing from getting worse.  Always wear hearing protection around loud noises.  Wear a hearing aid as directed.  A professional can help you pick a hearing aid that will work best for you.  You can also get hearing aids over the counter for mild to moderate hearing loss.  Have hearing tests as your doctor suggests. They can show whether your hearing has changed. Your hearing aid may need to be adjusted.  Use other devices as needed. These may include:  Telephone amplifiers and hearing aids that can connect to a television, stereo, radio, or microphone.  Devices that use lights or vibrations. These alert you to the doorbell, a ringing  "telephone, or a baby monitor.  Television closed-captioning. This shows the words at the bottom of the screen. Most new TVs can do this.  TTY (text telephone). This lets you type messages back and forth on the telephone instead of talking or listening. These devices are also called TDD. When messages are typed on the keyboard, they are sent over the phone line to a receiving TTY. The message is shown on a monitor.  Use text messaging, social media, and email if it is hard for you to communicate by telephone.  Try to learn a listening technique called speechreading. It is not lipreading. You pay attention to people's gestures, expressions, posture, and tone of voice. These clues can help you understand what a person is saying. Face the person you are talking to, and have them face you. Make sure the lighting is good. You need to see the other person's face clearly.  Think about counseling if you need help to adjust to your hearing loss.  When should you call for help?  Watch closely for changes in your health, and be sure to contact your doctor if:    You think your hearing is getting worse.     You have new symptoms, such as dizziness or nausea.   Where can you learn more?  Go to https://www.[employer].net/patiented  Enter R798 in the search box to learn more about \"Hearing Loss: Care Instructions.\"  Current as of: September 27, 2023               Content Version: 14.0    [id number] [employer].   Care instructions adapted under license by your healthcare professional. If you have questions about a medical condition or this instruction, always ask your healthcare professional. [employer] disclaims any warranty or liability for your use of this information.      Learning About Sleeping Well  What does sleeping well mean?     Sleeping well means getting enough sleep to feel good and stay healthy. How much sleep is enough varies among people.  The number of hours you sleep and how you feel " when you wake up are both important. If you do not feel refreshed, you probably need more sleep. Another sign of not getting enough sleep is feeling tired during the day.  Experts recommend that adults get at least 7 or more hours of sleep per day. Children and older adults need more sleep.  Why is getting enough sleep important?  Getting enough quality sleep is a basic part of good health. When your sleep suffers, your physical health, mood, and your thoughts can suffer too. You may find yourself feeling more grumpy or stressed. Not getting enough sleep also can lead to serious problems, including injury, accidents, anxiety, and depression.  What might cause poor sleeping?  Many things can cause sleep problems, including:  Changes to your sleep schedule.  Stress. Stress can be caused by fear about a single event, such as giving a speech. Or you may have ongoing stress, such as worry about work or school.  Depression, anxiety, and other mental or emotional conditions.  Changes in your sleep habits or surroundings. This includes changes that happen where you sleep, such as noise, light, or sleeping in a different bed. It also includes changes in your sleep pattern, such as having jet lag or working a late shift.  Health problems, such as pain, breathing problems, and restless legs syndrome.  Lack of regular exercise.  Using alcohol, nicotine, or caffeine before bed.  How can you help yourself?  Here are some tips that may help you sleep more soundly and wake up feeling more refreshed.  Your sleeping area   Use your bedroom only for sleeping and sex. A bit of light reading may help you fall asleep. But if it doesn't, do your reading elsewhere in the house. Try not to use your TV, computer, smartphone, or tablet while you are in bed.  Be sure your bed is big enough to stretch out comfortably, especially if you have a sleep partner.  Keep your bedroom quiet, dark, and cool. Use curtains, blinds, or a sleep mask to block  "out light. To block out noise, use earplugs, soothing music, or a \"white noise\" machine.  Your evening and bedtime routine   Create a relaxing bedtime routine. You might want to take a warm shower or bath, or listen to soothing music.  Go to bed at the same time every night. And get up at the same time every morning, even if you feel tired.  What to avoid   Limit caffeine (coffee, tea, caffeinated sodas) during the day, and don't have any for at least 6 hours before bedtime.  Avoid drinking alcohol before bedtime. Alcohol can cause you to wake up more often during the night.  Try not to smoke or use tobacco, especially in the evening. Nicotine can keep you awake.  Limit naps during the day, especially close to bedtime.  Avoid lying in bed awake for too long. If you can't fall asleep or if you wake up in the middle of the night and can't get back to sleep within about 20 minutes, get out of bed and go to another room until you feel sleepy.  Avoid taking medicine right before bed that may keep you awake or make you feel hyper or energized. Your doctor can tell you if your medicine may do this and if you can take it earlier in the day.  If you can't sleep   Imagine yourself in a peaceful, pleasant scene. Focus on the details and feelings of being in a place that is relaxing.  Get up and do a quiet or boring activity until you feel sleepy.  Avoid drinking any liquids before going to bed to help prevent waking up often to use the bathroom.  Where can you learn more?  Go to https://www.[employer].net/patiented  Enter J942 in the search box to learn more about \"Learning About Sleeping Well.\"  Current as of: July 10, 2023  Content Version: 14.1 2006-2024 [employer].   Care instructions adapted under license by your healthcare professional. If you have questions about a medical condition or this instruction, always ask your healthcare professional. [employer] disclaims any warranty or liability " for your use of this information.    Learning About Depression Screening  What is depression screening?  Depression screening is a way to see if you have depression symptoms. It may be done by a doctor or counselor. It's often part of a routine checkup. That's because your mental health is just as important as your physical health.  Depression is a mental health condition that affects how you feel, think, and act. You may:  Have less energy.  Lose interest in your daily activities.  Feel sad and grouchy for a long time.  Depression is very common. It affects people of all ages.  Many things can lead to depression. Some people become depressed after they have a stroke or find out they have a major illness like cancer or heart disease. The death of a loved one or a breakup may lead to depression. It can run in families. Most experts believe that a combination of inherited genes and stressful life events can cause it.  What happens during screening?  You may be asked to fill out a form about your depression symptoms. You and the doctor will discuss your answers. The doctor may ask you more questions to learn more about how you think, act, and feel.  What happens after screening?  If you have symptoms of depression, your doctor will talk to you about your options.  Doctors usually treat depression with medicines or counseling. Often, combining the two works best. Many people don't get help because they think that they'll get over the depression on their own. But people with depression may not get better unless they get treatment.  The cause of depression is not well understood. There may be many factors involved. But if you have depression, it's not your fault.  A serious symptom of depression is thinking about death or suicide. If you or someone you care about talks about this or about feeling hopeless, get help right away.  It's important to know that depression can be treated. Medicine, counseling, and self-care may  "help.  Where can you learn more?  Go to https://www.[employer].net/patiented  Enter T185 in the search box to learn more about \"Learning About Depression Screening.\"  Current as of: June 24, 2023  Content Version: 14.1 2006-2024 [employer].   Care instructions adapted under license by your healthcare professional. If you have questions about a medical condition or this instruction, always ask your healthcare professional. [employer], [employer] disclaims any warranty or liability for your use of this information.       "

## 2024-08-29 NOTE — PROGRESS NOTES
Preventive Care Visit  [facility]  Lary Nicolas MD, Family Medicine  Aug 29, 2024      Assessment & Plan     Encounter for Medicare annual wellness exam    Neuropathic pain  Cauda equina spinal cord injury, sequela  Neck stiffness/pain/headache  Severe, intolerable and progressive centrally mediated pain. So painful that he's considering suicide, not due to depression, but due to severe pain. Medications aren't helping. Progressive disability - now using a wheelchair and finding it difficult to use the bathroom. He went to the ER once and they weren't able to offer any additional treatment. Recent epidural steroid injection not helpful. Pt wonders if his pain could be due to herpes infection of his spine. He wonders if he has a blood stream infection. Worried about a shunt infection or malfunction based on headache that happens if he likes down and pain/stiffness of neck when he touches chin to chest. Pacheco has a good relationship with his neurologist and pain doctor, but access is limited and he doesn't think he can make it until his scheduled follow up appt.    Of note, he does have vasomotor instability with skin flushing and swelling in the right hand and left foot. Consider Complex Regional Pain Syndrome?    Our nurse called Dr. Chu at [facility] and left an urgent message.   She also called Dr. Rojas at [facility] and left an urgent message.  Plan short term follow up in one week.    - ESR: Erythrocyte sedimentation rate  - CRP, inflammation  - LYME DISEASE TOTAL ANTIBODIES WITH REFLEX TO CONFIRMATION  - Vitamin B12  - CBC with platelets  - Comprehensive metabolic panel  - Hepatitis C Screen Reflex to HCV RNA Quant and Genotype  - Herpes Simplex Virus 1 and 2 IgG    Major Depression Severe Single Episode  Pt feels that depression is due to unrelenting pain. He is on maximum helpful dose of duloxetine 60 mg. Add Abilify 2 mg as adjunct.  -  ARIPiprazole (ABILIFY) 2 MG tablet; Take 1 tablet (2 mg) by mouth daily.    Herpes keratitis of left eye  Longstanding eye symptoms and reported history of herpes keratitis. We don't have records of prior care. Eye continues to cause problems. Referral made to [facility].  - Adult Eye  Referral; Future    Depression Screening Follow Up        8/29/2024     1:50 PM   PHQ   PHQ-9 Total Score 12   Q9: Thoughts of better off dead/self-harm past 2 weeks Several days   F/U: Thoughts of suicide or self-harm No   F/U: Safety concerns No                     Follow Up Actions Taken  Crisis resource information provided in the After Visit Summary    Discussed the following ways the patient can remain in a safe environment:  be around others  Counseling  Appropriate preventive services were addressed with this patient via screening, questionnaire, or discussion as appropriate for fall prevention, nutrition, physical activity, Tobacco-use cessation, social engagement, weight loss and cognition.  Checklist reviewing preventive services available has been given to the patient.  Reviewed patient's diet, addressing concerns and/or questions.   He is at risk for lack of exercise and has been provided with information to increase physical activity for the benefit of his well-being.   Patient is at risk for social isolation and has been provided with information about the benefit of social connection.   Discussed possible causes of fatigue. Updated plan of care.  Patient reported difficulty with activities of daily living were addressed today.The patient was provided with written information regarding signs of hearing loss.   The patient's PHQ-9 score is consistent with moderate depression. He was provided with information regarding depression.     Particio Bergman is a 33 year old, presenting for the following:  Wellness Visit, Herpes on left eye, Pain (On back radiated down to legs. Started beginning of July. The pain is  "getting worsen since then. 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  Eye doctor sent her to an eye specialist - was diagnosed with herpes in eye. Got eye drops. The eye doctor moved to different location and he hasn't been able to get care since then.  Left eye blurry cloudy watery itchy. Right eye normal.   Wonders if they eye infection has resulted in nerve problems in spine - cauda equina syndrome.     Severe Pain  \"I'm scared for my life right now.\" \"I want to end my life because the pain is unbearable.\"  The pain is really unbearable.  Constant all of the time.  Can't even use the restroom because it involves the groin. 10/10 in severity.   Medicine isn't helping. It's not doing anything.   Got injection on Monday. It didn't relieve any pain - not even right away.   Would cut his wrists.   Currently self-inflicting bodily pain - hitting himself to distract himself from the other pain.   Went to the ER for pain, they locked him up and didn't do anything for the pain. They just observed the suffering.    Pain is causing depression.     Right hand is red and swollen - that happens when he has the pain. It is more burning when it is red. It happens in the right and and the left food.           3/27/2024    10:03 AM 7/31/2024     4:12 PM 8/29/2024     1:50 PM   PHQ   PHQ-9 Total Score 17 7 12   Q9: Thoughts of better off dead/self-harm past 2 weeks Several days Not at all Several days   F/U: Thoughts of suicide or self-harm No  No   F/U: Safety concerns No  No              8/29/2024     2:19 PM   Additional Questions   Roomed by Jovany FRANCES   Accompanied by self         Health Care Directive  Patient does not have a Health Care Directive or Living Will: Discussed advance care planning with patient; information given to patient to review.        8/29/2024   General " Health   How would you rate your overall physical health? (!) POOR   Feel stress (tense, anxious, or unable to sleep) To some extent      (!) STRESS CONCERN      8/29/2024   Nutrition   Diet: Diabetic            8/29/2024   Exercise   Days per week of moderate/strenous exercise 1 day      (!) EXERCISE CONCERN      8/29/2024   Social Factors   Frequency of gathering with friends or relatives Never   Worry food won't last until get money to buy more Yes   Food not last or not have enough money for food? Yes   Do you have housing? (Housing is defined as stable permanent housing and does not include staying ouside in a car, in a tent, in an abandoned building, in an overnight shelter, or couch-surfing.) Yes   Are you worried about losing your housing? No   Lack of transportation? Yes   Unable to get utilities (heat,electricity)? Patient declined      (!) FOOD SECURITY CONCERN PRESENT (!) TRANSPORTATION CONCERN PRESENT(!) SOCIAL CONNECTIONS CONCERN      8/29/2024   Fall Risk   Fallen 2 or more times in the past year? Yes   Trouble with walking or balance? Yes   Reason Gait Speed Test Not Completed Patient does not tolerate an upright or standing position (e.g. wheelchair)             8/29/2024   Activities of Daily Living- Home Safety   Needs help with the following daily activites Transportation    Shopping    Preparing meals    Housework    Bathing    Laundry    Medication administration    Money management    Toileting    Dressing   Safety concerns in the home None of the above       Multiple values from one day are sorted in reverse-chronological order         8/29/2024   Dental   Dentist two times every year? Yes            8/29/2024   Hearing Screening   Hearing concerns? (!) IT'S HARD TO FOLLOW A CONVERSATION IN A NOISY RESTAURANT OR CROWDED ROOM.    (!) TROUBLE UNDESTANDING A SPEAKER IN A PUBLIC MEETING OR [facility]ICE.    (!) TROUBLE UNDERSTANDING SOFT OR WHISPERED SPEECH.    (!) TROUBLE UNDERSTANDING  SPEECH ON THE TELEPHONE       Multiple values from one day are sorted in reverse-chronological order         8/29/2024   Driving Risk Screening   Patient/family members have concerns about driving No            8/29/2024   General Alertness/Fatigue Screening   Have you been more tired than usual lately? (!) YES            8/29/2024   Urinary Incontinence Screening   Bothered by leaking urine in past 6 months No            8/29/2024   TB Screening   Were you born outside of the [place]? No          Today's PHQ-9 Score:       8/29/2024     1:50 PM   PHQ-9 SCORE   PHQ-9 Total Score MyChart 12 (Moderate depression)   PHQ-9 Total Score 12         8/29/2024   Substance Use   Alcohol more than 3/day or more than 7/wk No   Do you have a current opioid prescription? No   How severe/bad is pain from 1 to 10? 9/10   Do you use any other substances recreationally? No        Social History     Tobacco Use    Smoking status: Never     Passive exposure: Never    Smokeless tobacco: Never   Vaping Use    Vaping status: Never Used   Substance Use Topics    Alcohol use: No    Drug use: No             8/29/2024   Contraception/Family Planning   Questions about contraception or family planning (!) DECLINE            Reviewed and updated as needed this visit by Provider   Tobacco  Allergies  Meds  Problems  Med Hx  Surg Hx  Fam Hx            Current providers sharing in care for this patient include:  Patient Care Team:  Lary Nicolas MD as PCP - General (Family Medicine)  Marcial Rojas MD as Physician (Neurology)  Lary Nicolas MD as Assigned PCP  CAROLINE Dsouza as Therapist (Licensed Mental Health)  Milton Denise MD as Psychiatrist (Psychiatry)  Breezy Pathak [facility] as Assigned MTM Pharmacist  Radha Michaud RD as Registered Dietitian (Diabetes Education)  Goldie Sloan OD as MD (Optometry)  Nancy Pineda, W as Community Health Worker  Romaine Bowser DPM as Assigned Surgical Provider  Eloina Diaz LSW  "as Lead Care Coordinator  Charly Chu MD as MD (Pain Medicine)    The following health maintenance items are reviewed in Epic and correct as of today:  Health Maintenance   Topic Date Due    URINE DRUG SCREEN  04/07/2024    INFLUENZA VACCINE (1) 09/01/2024    A1C  09/14/2024    PHQ-9  02/28/2025    LIPID  03/27/2025    MICROALBUMIN  03/27/2025    DIABETIC FOOT EXAM  03/27/2025    ANNUAL REVIEW OF HM ORDERS  03/27/2025    EYE EXAM  06/01/2025    MEDICARE ANNUAL WELLNESS VISIT  08/29/2025    BMP  08/29/2025    ADVANCE CARE PLANNING  03/27/2029    DTAP/TDAP/TD IMMUNIZATION (9 - Tdap) 10/08/2029    HEPATITIS C SCREENING  Completed    HIV SCREENING  Completed    DEPRESSION ACTION PLAN  Completed    Pneumococcal Vaccine: Pediatrics (0 to 5 Years) and At-Risk Patients (6 to 64 Years)  Completed    MENINGITIS IMMUNIZATION  Completed    HEPATITIS B IMMUNIZATION  Completed    COVID-19 Vaccine  Completed    HPV IMMUNIZATION  Aged Out    RSV MONOCLONAL ANTIBODY  Aged Out            Objective    Exam  /89 (BP Location: Left arm, Patient Position: Sitting, Cuff Size: Adult Large)   Pulse 92   Temp 97.9  F (36.6  C) (Oral)   Resp 20   SpO2 97%    Estimated body mass index is 44.39 kg/m  as calculated from the following:    Height as of 3/27/24: 1.676 m (5' 6\").    Weight as of 4/16/24: 124.7 kg (275 lb).    Physical Exam             8/29/2024   Mini Cog   Clock Draw Score 0 Abnormal   3 Item Recall 0 objects recalled   Mini Cog Total Score 0                 Signed Electronically by: Lary Nicolas MD      Prior to immunization administration, verified patients identity using patient s name and date of birth. Please see Immunization Activity for additional information.     Screening Questionnaire for Adult Immunization    Are you sick today?   No   Do you have allergies to medications, food, a vaccine component or latex?   Yes   Have you ever had a serious reaction after receiving a vaccination?   No   Do you have " a long-term health problem with heart, lung, kidney, or metabolic disease (e.g., diabetes), asthma, a blood disorder, no spleen, complement component deficiency, a cochlear implant, or a spinal fluid leak?  Are you on long-term aspirin therapy?   Don't Know   Do you have cancer, leukemia, HIV/AIDS, or any other immune system problem?   Don't Know   Do you have a parent, brother, or sister with an immune system problem?   No   In the past 3 months, have you taken medications that affect  your immune system, such as prednisone, other steroids, or anticancer drugs; drugs for the treatment of rheumatoid arthritis, Crohn s disease, or psoriasis; or have you had radiation treatments?   No   Have you had a seizure, or a brain or other nervous system problem?   Yes   During the past year, have you received a transfusion of blood or blood    products, or been given immune (gamma) globulin or antiviral drug?   No   For women: Are you pregnant or is there a chance you could become       pregnant during the next month?   No   Have you received any vaccinations in the past 4 weeks?   No     Immunization questionnaire was positive for at least one answer.  Notified provider.      Patient instructed to remain in clinic for 15 minutes afterwards, and to report any adverse reactions.     Screening performed by Jovany Tipton MA on 8/29/2024 at 2:31 PM.        Answers submitted by the patient for this visit:  Patient Health Questionnaire (Submitted on 8/29/2024)  If you checked off any problems, how difficult have these problems made it for you to do your work, take care of things at home, or get along with other people?: Very difficult  PHQ9 TOTAL SCORE: 12

## 2024-08-29 NOTE — TELEPHONE ENCOUNTER
Spoke with Sylvia at [facility].    She will send high priority message to Dr. Rojas. Confirmed correct [facility] callback number.    Deepti Abernathy RN  [facility]

## 2024-08-29 NOTE — TELEPHONE ENCOUNTER
Called and spoke to patient. Pt states he is unable to buy over the counter. He does not have any money or a job to pay for medication

## 2024-08-29 NOTE — TELEPHONE ENCOUNTER
Spoke with Jose Luis--TC at [facility]   He will relay high priority message to Dr. Chu/RN care team. Provided callback number for [facility] if needed.    Deepti Abernathy RN  [facility]

## 2024-08-30 ENCOUNTER — TELEPHONE (OUTPATIENT)
Dept: FAMILY MEDICINE | Facility: CLINIC | Age: 34
End: 2024-08-30
Payer: COMMERCIAL

## 2024-08-30 DIAGNOSIS — R25.2 SPASTICITY: ICD-10-CM

## 2024-08-30 DIAGNOSIS — R10.13 EPIGASTRIC PAIN: ICD-10-CM

## 2024-08-30 PROBLEM — S34.3XXS: Status: ACTIVE | Noted: 2024-08-30

## 2024-08-30 PROBLEM — B00.52: Status: ACTIVE | Noted: 2024-08-30

## 2024-08-30 LAB
B BURGDOR IGG+IGM SER QL: 0.05
HSV1 IGG SERPL QL IA: 0.02 INDEX
HSV1 IGG SERPL QL IA: NORMAL
HSV2 IGG SERPL QL IA: 0.05 INDEX
HSV2 IGG SERPL QL IA: NORMAL

## 2024-08-30 RX ORDER — CYANOCOBALAMIN 1000 UG/ML
1000 INJECTION, SOLUTION INTRAMUSCULAR; SUBCUTANEOUS WEEKLY
Status: ACTIVE | OUTPATIENT
Start: 2024-08-30 | End: 2024-10-25

## 2024-08-30 RX ORDER — CYANOCOBALAMIN 1000 UG/ML
1000 INJECTION, SOLUTION INTRAMUSCULAR; SUBCUTANEOUS
Status: ACTIVE | OUTPATIENT
Start: 2024-11-01 | End: 2025-10-26

## 2024-08-30 RX ORDER — BACLOFEN 10 MG/1
10 TABLET ORAL 3 TIMES DAILY
Qty: 100 TABLET | Refills: 11 | Status: SHIPPED | OUTPATIENT
Start: 2024-08-30

## 2024-08-30 NOTE — TELEPHONE ENCOUNTER
Medication Question or Refill    Contacts       Contact Date/Time Type Contact Phone/Fax    08/30/2024 09:07 AM CDT Interface (Incoming) [facility] - [place], [place] - [address] [phone]    08/30/2024 09:36 AM CDT Phone (Incoming) Pacheco David (Self) [phone]            What medication are you calling about (include dose and sig)?: omeprazole (PRILOSEC) 20 MG DR capsule  and baclofen (LIORESAL) 10 MG tablet     Preferred Pharmacy:    [id number] [place], [place] - [address] [place] [address] AT [facility] [address]  [address] [place] [id number]  Phone: [phone] Fax: [phone]      Controlled Substance Agreement on file:   CSA -- Patient Level:    CSA: None found at the patient level.       Who prescribed the medication?: PCP    Do you need a refill? Yes    When did you use the medication last?     Patient offered an appointment? No    Do you have any questions or concerns?  Yes: Please refill ASAP. Completely out of medications/      Could we [facility] this [facility] or would you prefer to receive a phone call?:   Patient would prefer a phone call   Okay to leave a detailed message?: Yes at Home number on file [phone] (home)    
Detail Level: Detailed

## 2024-08-30 NOTE — TELEPHONE ENCOUNTER
Please help advocate for Pacheco. He has severe unrelenting pain and is feeling suicidal due to the pain. We left messages with his pain and neurologist doctors so they could get him in for evaluation. He also needs to see an ophthalmologist for his left eye.    Could you help him get these appointments scheduled for within the next week?

## 2024-09-03 ENCOUNTER — PATIENT OUTREACH (OUTPATIENT)
Dept: CARE COORDINATION | Facility: CLINIC | Age: 34
End: 2024-09-03
Payer: COMMERCIAL

## 2024-09-03 DIAGNOSIS — E53.8 VITAMIN B12 DEFICIENCY (NON ANEMIC): Primary | ICD-10-CM

## 2024-09-03 NOTE — TELEPHONE ENCOUNTER
Lary Nicolas MD  P [facility]  Please let Pacheco know:    His vitamin B12 level is still on the low end. I'd like to get this back to normal before we go to every month dosing. I'd like him to get a vitamin B12 shot *weekly* for the next 8 weeks, then we'll start doing it monthly. Please set him up with nurse visits for that. Orders are in.    There is evidence of increased inflammation in his body.  NO evidence of herpes, lyme disease, or hepatitis C infection.  Kidney and liver tests are NORMAL now.  Blood counts are normal.

## 2024-09-04 NOTE — PROGRESS NOTES
Clinic Care Coordination Contact  Follow Up Progress Note      Assessment: CC SW called to follow up with pt's regarding next neurology & pain appt    Care Gaps:    Health Maintenance Due   Topic Date Due    MEDICARE ANNUAL WELLNESS VISIT  07/17/2021    URINE DRUG SCREEN  04/07/2024    INFLUENZA VACCINE (1) 09/01/2024    A1C  09/14/2024       Postponed to a later date due to the nature of the conversation     Care Plans  Care Plan: Transportation       Problem: Lack of transportation       Goal: Establish reliable transportation       Start Date: 6/18/2024    This Visit's Progress: 60% Recent Progress: 60%    Note:     Barriers: N/A  Strengths: Family support, waiver services  Patient expressed understanding of goal: Yes    Action steps to achieve this goal:  1. I will wait for the [employer] mobility to be mailed to my home and have my family assist me with completing the document if needed. (Resent week of 7/23/2024)- completed  2. I will reschedule my upcoming appts that I am unable to currently make due to transportation concerns and connect with my family for their support for appointments that they can help me get to. (Updated: 08/19/2024)- continues  3. I will connect with CCC team at least once a month to discuss goal progression and address any other needs/concerns that may arise. (Updated: 08/23/2024)- continues  4. I will update CHW once received the [employer] mobility application and MTM transportation resources (including steps by steps) from [facility] team via mail. (Completed: received application from CHW per pt on 8/09/2024)  5. I will wait to hear from [facility] team regards to transportation service info. (Completed: 7/23/2024)  6. I will fill out my part (patient section) of the [employer] Application and drop off the application to my PCP in the [facility] week of 8/12/2024 for Dr. Nicolas (PCP) to fill out the physician section. (Completed)  7. I will wait to received a copy of the [employer]  mobility form send to me via mail. (Updated: 8/23/2024)- continues  8. I will wait to hear from [facility] Mobility Office for the next step. (Updated: 8/23/2024)- continues                            CC CHIO called and connected with pt this afternoon to check on the concerns that were raised by PCP regarding his pain and checking if he has appointments upcoming. Pt reported that he received a call and has an appt scheduled for 9/19 with his neurologist. Pt reported that he was unable to get in sooner than the 19th as there was not availability. Pt reports that he is on the waitlist for getting in sooner if there is a cancellation. SW offered for pt to speak with CC RN about his pain/pain management as he reports having a negative experience as the ER and did not want to go back and could discuss with her alternatives or plan for other interventions.    Intervention/Education provided during outreach: [facility] Care Coordination, CC RN    Plan: Pt is on the waitlist for neurology appt to get in sooner in the event that a appt was canceled. Pt has appt on 9/10. CC CHIO spoke with CC RN during consultation and RN plans to outreach to pt regarding pain concerns.    Care Coordinator will follow up in a couple weeks to check on status of appts that were made.     DAISHA Cross   Social Work Care Coordinator   [facility]  [phone]

## 2024-09-05 NOTE — TELEPHONE ENCOUNTER
"Chart reviewed. He was able to get in with neurology for LP 9/10/24 and Dr. Rojas 9/19/24.    No luck getting in sooner for pain care. Note from their nurse: \"Spoke with pt, injection did not work at all for him. We discussed that unfortunately that means surgery most likely will not help him. He is aware of this. He has updated imaging scheduled for next week. He requests a sooner visit to see Dr. Guadarrama. Let him know we have no sooner appointments at this time but we will keep him on a cancellation list and call him if something opens up. He agrees with plan. Ana Fierro RN 8/30/2024, 10:37 AM\"    I know that ER is not the best place for care of his pain, but if it's so severe that he's going to hurt himself, he should go to the ER so he can be seen sooner and get a plan of care to manage his severe pain until seen. I'm not concerned about medication misuse/abuse/dependence or even uncontrolled depression in this patient. Much more concerned that he has severe uncontrolled pain that he's not getting relief from.     Upcoming Encounters  Date Type Department Care Team (Latest Contact Info) Description   09/09/2024 1:00 PM CDT Telemedicine BH Therapy at [facility]   [address] [place] [address].   [place] [address]   [phone]  Lucas Hernandez, MSW, [facility]   [address] [place] [address]   [phone] (Work)   [phone] (Fax)      09/10/2024 9:30 AM CDT Appointment [facility] [address] [place] [address]   [phone]  Marcial Rojas MD   [address] [place] [address]   [phone] (Work)   [phone] (Fax)      3, [facility]      Radiology,  Pa1   [address] [place] [address]   [phone] (Work)      21, [facility]   [address] [place] [address]   [phone] (Work)      09/19/2024 11:15 AM CDT Appointment Neurology at [facility]   [address] "   [place] [address]   [phone]  Marcial Rojas MD   [address] [place] [address]   [phone] (Work)   [phone] (Fax)      09/26/2024 11:40 AM CDT Appointment [facility]   [address]   [place] [address]   [phone]  Robert Guadarrama MD   [address] [place], [place] [id number] [phone] (Work)   [phone] (Fax)

## 2024-09-06 ENCOUNTER — VIRTUAL VISIT (OUTPATIENT)
Dept: FAMILY MEDICINE | Facility: CLINIC | Age: 34
End: 2024-09-06
Payer: COMMERCIAL

## 2024-09-06 DIAGNOSIS — M79.671 FOOT PAIN, RIGHT: ICD-10-CM

## 2024-09-06 DIAGNOSIS — R10.13 EPIGASTRIC PAIN: ICD-10-CM

## 2024-09-06 DIAGNOSIS — M79.2 NEUROPATHIC PAIN: Primary | ICD-10-CM

## 2024-09-06 DIAGNOSIS — R25.2 SPASTICITY: ICD-10-CM

## 2024-09-06 PROCEDURE — 99215 OFFICE O/P EST HI 40 MIN: CPT | Mod: 95 | Performed by: FAMILY MEDICINE

## 2024-09-06 RX ORDER — IBUPROFEN 800 MG/1
800 TABLET, FILM COATED ORAL 3 TIMES DAILY
Qty: 100 TABLET | Refills: 1 | Status: SHIPPED | OUTPATIENT
Start: 2024-09-06 | End: 2024-09-06

## 2024-09-06 RX ORDER — LAMOTRIGINE 100 MG/1
100 TABLET ORAL 2 TIMES DAILY
Qty: 180 TABLET | Refills: 3 | Status: SHIPPED | OUTPATIENT
Start: 2024-09-06 | End: 2024-09-06

## 2024-09-06 RX ORDER — IBUPROFEN 800 MG/1
800 TABLET, FILM COATED ORAL 3 TIMES DAILY
Qty: 100 TABLET | Refills: 1 | Status: SHIPPED | OUTPATIENT
Start: 2024-09-06 | End: 2024-09-16

## 2024-09-06 RX ORDER — LAMOTRIGINE 100 MG/1
100 TABLET ORAL 2 TIMES DAILY
Qty: 180 TABLET | Refills: 3 | Status: SHIPPED | OUTPATIENT
Start: 2024-09-06 | End: 2024-09-16

## 2024-09-06 NOTE — PATIENT INSTRUCTIONS
Severe Neuropathic Pain  Gabapentin 1200 mg 7 AM, 1200 mg 2 pm, 1800 mg 10 pm (no change)  Tylenol 1000 mg (two 500 mg tabs), three times a day with gabapentin (no change)  Duloxetine 60 mg daily (no change)  Lamotrigine 100 twice daily at 7AM + 10PM (increased dose)  Ibuprofen 800 three times daily with food (new)  Turmeric 1000 mg twice daily at 7AM + 10PM (new)    Right Foot Lump/Pain  X-ray and ultrasound today

## 2024-09-06 NOTE — PROGRESS NOTES
"____________________________    Virtual Visit - Phone Encounter  [facility]  Family Medicine  Date of Service: 9/6/2024    Subjective:  Chief Complaint   Patient presents with    Follow Up      \"I'm feeling really bad\". Pain level is skyrocketing  Actually feeling worse now than before steroid injection.    Burning sensation  Very well-defined area of burning - both legs and right arm.   Location: Bilateral lateral thighs, medial calves, and big toes. Right shoulder to right thumb.    Adjusted shunt yesterday - numbers had bumped up. MRI head and whole spine, results pending.  Next week LP on the 10th.     Neck is really stiff - hurts with any movement.     Current pain medication:  Gabapentin 1200 mg 7 AM, 1200 mg 2 pm, 1800 mg 10 pm  Tylenol 2 500 mg tabs, three times a day with gabapentin.  Duloxetine 60 mg daily  Lamotrigine 50 twice daily   Ibuprofen - doesn't have  Oxycodone didn't help at all.     Bump on top of right foot  Tender  Really hard like a bone  No injury  ~1-1/3\"    Objective:  Vitals - Patient Reported  Weight (Patient Reported): 114.3 kg (252 lb)  Height (Patient Reported): 167.6 cm (5' 6\")  BMI (Based on Pt Reported Ht/Wt): 40.67    No visits with results within 1 Week(s) from this visit.   Latest known visit with results is:   Office Visit on 08/29/2024   Component Date Value Ref Range Status    HSV Type 1 IgG Instrument Value 08/29/2024 0.02  <0.90 Index Final    Herpes Simplex Virus Type 1 IgG An* 08/29/2024 No HSV-1 IgG antibodies detected.  No HSV-1 IgG antibodies detected Final    HSV Type 2 IgG Instrument Value 08/29/2024 0.05  <0.90 Index Final    Herpes Simplex Virus Type 2 IgG An* 08/29/2024 No HSV-2 IgG antibodies detected.  No HSV-2 IgG antibodies detected Final    Erythrocyte Sedimentation Rate 08/29/2024 44 (H)  0 - 15 mm/hr Final    CRP Inflammation 08/29/2024 4.60  <5.00 mg/L Final    Lyme Disease Antibodies Total 08/29/2024 0.05  <0.90 Final    " Non-reactive, Absence of detectable Borrelia burgdorferi antibodies. A non-reactive result does not exclude the possibility of Borrelia burgdorferi infection. If early Lyme disease is suspected, a second sample should be collected and tested 2 to 4 weeks later.    Vitamin B12 08/29/2024 275  232 - 1,245 pg/mL Final    WBC Count 08/29/2024 6.0  4.0 - 11.0 10e3/uL Final    RBC Count 08/29/2024 5.54  4.40 - 5.90 10e6/uL Final    Hemoglobin 08/29/2024 14.5  13.3 - 17.7 g/dL Final    Hematocrit 08/29/2024 45.0  40.0 - 53.0 % Final    MCV 08/29/2024 81  78 - 100 fL Final    MCH 08/29/2024 26.2 (L)  26.5 - 33.0 pg Final    MCHC 08/29/2024 32.2  31.5 - 36.5 g/dL Final    RDW 08/29/2024 13.2  10.0 - 15.0 % Final    Platelet Count 08/29/2024 354  150 - 450 10e3/uL Final    Sodium 08/29/2024 140  135 - 145 mmol/L Final    Potassium 08/29/2024 3.8  3.4 - 5.3 mmol/L Final    Carbon Dioxide (CO2) 08/29/2024 24  22 - 29 mmol/L Final    Anion Gap 08/29/2024 13  7 - 15 mmol/L Final    Urea Nitrogen 08/29/2024 13.1  6.0 - 20.0 mg/dL Final    Creatinine 08/29/2024 0.64 (L)  0.67 - 1.17 mg/dL Final    GFR Estimate 08/29/2024 >90  >60 mL/min/1.73m2 Final    eGFR calculated using 2021 CKD-EPI equation.    Calcium 08/29/2024 9.4  8.8 - 10.4 mg/dL Final    Reference intervals for this test were updated on 7/16/2024 to reflect our healthy population more accurately. There may be differences in the flagging of prior results with similar values performed with this method. Those prior results can be interpreted in the context of the updated reference intervals.    Chloride 08/29/2024 103  98 - 107 mmol/L Final    Glucose 08/29/2024 91  70 - 99 mg/dL Final    Alkaline Phosphatase 08/29/2024 79  40 - 150 U/L Final    AST 08/29/2024 29  0 - 45 U/L Final    ALT 08/29/2024 44  0 - 70 U/L Final    Protein Total 08/29/2024 7.9  6.4 - 8.3 g/dL Final    Albumin 08/29/2024 4.6  3.5 - 5.2 g/dL Final    Bilirubin Total 08/29/2024 0.4  <=1.2 mg/dL Final     Hepatitis C Antibody 08/29/2024 Nonreactive  Nonreactive Final    A nonreactive screening test result does not exclude the possibility of exposure to or infection with HCV. Nonreactive screening test results in individuals with prior exposure to HCV may be due to antibody levels below the limit of detection of this assay or lack of reactivity to the HCV antigens used in this assay. Patients with recent HCV infections (<3 months from time of exposure) may have false-negative HCV antibody results due to the time needed for seroconversion (average of 8 to 9 weeks).     XR Fluoro Needle Placement Non-Spine (With Procedure)    Result Date: 8/26/2024  This is an imaging order that has been read externally.     XR Thoracic Lumbar Spine 2 Views    Result Date: 8/13/2024  EXAM: XR LUMBAR SPINE 2 VIEWS LOCATION:  [facility] DATE: 8/13/2024 INDICATION: Spinal stability, Encounter for screening, unspecified COMPARISON: MRI 5/31/2024 from [facility] IMPRESSION: 5 lumbar type vertebra. Normal lumbar alignment similar to the previous exam. Preserved vertebral body heights. No fracture or subluxation identified. Relatively preserved intervertebral disc heights radiographically. Partially visualized presumed shunt catheter extending across the upper abdomen.     Assessment & Plan:  Severe, uncontrolled, neuropathic pain.  This pain is acute on chronic. Unclear what the cause of the pain is. Neck stiffness is concerning for meningeal inflammation.     Just had shunt adjusted yesterday without improvement in pain. MRI results are pending. He is actively working with neurology and neurosurgery to determine etiology. LP is pending for next week. He has a pain team at , but they haven't been able to work him in.     He's starting to lose hope. Not depressed, just in such severe pain he doesn't see any solutions for relief.     Discussed patient with our clinic pharmacist. He is on max doses of gabapentin and duloxetine.  Discussed options of intranasal lidocaine spray and ketamine bernard, but it's unaffordable to him. Plan to increase lamotrigine and add ibuprofen and turmeric.     He has follow up appointments for LP, neurology, and pain. He knows to go to ER if completely unable to manage his pain at home.     Pain medication plan:  Gabapentin 1200 mg 7 AM, 1200 mg 2 pm, 1800 mg 10 pm  Tylenol 1000 mg (two 500 mg tabs), three times a day with gabapentin.  Duloxetine 60 mg daily  Lamotrigine 100 twice daily  (increase dose)  Ibuprofen 800 three times daily with food (new)  Turmeric 1000 mg twice daily (new)    2.   Foot lump, right foot  New tender lump on top of foot, bony consistency (per pt report), atraumatic.   Check US and x-ray today.    Order Summary                                                      Pacheco was seen today for follow up.    Diagnoses and all orders for this visit:    Neuropathic pain  -     ibuprofen (ADVIL/MOTRIN) 800 MG tablet; Take 1 tablet (800 mg) by mouth 3 times daily.  -     lamoTRIgine (LAMICTAL) 100 MG tablet; Take 1 tablet (100 mg) by mouth 2 times daily.  -     Turmeric 5-1000 MG CAPS; Take 1 Capful by mouth 2 times daily.    Epigastric pain    Spasticity    Foot pain, right  -     XR Foot Right G/E 3 Views; Future  -     US Lower Extremity Venous Duplex Bilateral; Future        Future Appointments   Date Time Provider Department Center   9/9/2024  9:30 AM SPRS MA/LPN [facility]       Completed by: Lary Nicolas M.D., [facility] Medicine. 9/6/2024 8:31 AM.  This transcription uses voice recognition software, which may contain typographical errors.  ____________________________    Start visit: 8:31 AM. End visit: 9:31 AM   Physician location: [facility], on site.  Patient location: Home  Platform: [facility]    I spent over an hour reviewing outside records, test results, discussing with pharmacist, called his sister to see if she could help him get medication, then  calling the patient back.     Pacheco is a 33 year old who is being evaluated via a billable video visit.    How would you like to obtain your AVS? MyChart  If the video visit is dropped, the invitation should be resent by: Text to cell phone: [phone]  Will anyone else be joining your video visit? No    Answers submitted by the patient for this visit:  General Questionnaire (Submitted on 9/6/2024)  Chief Complaint: Chronic problems general questions HPI Form  What is the reason for your visit today? : follow up  How many servings of fruits and vegetables do you eat daily?: 0-1  On average, how many sweetened beverages do you drink each day (Examples: soda, juice, sweet tea, etc.  Do NOT count diet or artificially sweetened beverages)?: 0  How many minutes a day do you exercise enough to make your heart beat faster?: 10 to 19  How many days a week do you exercise enough to make your heart beat faster?: 7  How many days per week do you miss taking your medication?: 0

## 2024-09-11 NOTE — TELEPHONE ENCOUNTER
First attempt: No answer. LVM to call clinic and ask to speak to a nurse. Please relay clinician's message upon return call.     Varun MURILLO RN  [facility] Primary Care [facility]

## 2024-09-13 ENCOUNTER — PATIENT OUTREACH (OUTPATIENT)
Dept: CARE COORDINATION | Facility: CLINIC | Age: 34
End: 2024-09-13
Payer: COMMERCIAL

## 2024-09-13 DIAGNOSIS — M79.2 NEUROPATHIC PAIN: ICD-10-CM

## 2024-09-13 NOTE — PROGRESS NOTES
Care Coordination Clinician Chart Review    Situation: Patient chart reviewed by Care Coordinator.       Background: Care Coordination Program started: 6/14/2024. Initial assessment completed and patient-centered care plan(s) were developed with participation from patient. Lead CC handed patient off to CHW for continued outreaches.       Assessment: Per chart review, patient outreach completed by CC CHW on 8/23/2024.  Patient is actively working to accomplish goal(s). Patient's goal(s) appropriate and relevant at this time. Patient is due for updated Plan of Care.  Assessments will be completed annually or as needed/with change of patient status.      Care Plan: Transportation       Problem: Lack of transportation       Goal: Establish reliable transportation       Start Date: 6/18/2024    This Visit's Progress: 60% Recent Progress: 60%    Note:     Barriers: N/A  Strengths: Family support, waiver services  Patient expressed understanding of goal: Yes    Action steps to achieve this goal:  1. I will wait for the [employer] mobility to be mailed to my home and have my family assist me with completing the document if needed. (Resent week of 7/23/2024)- completed  2. I will reschedule my upcoming appts that I am unable to currently make due to transportation concerns and connect with my family for their support for appointments that they can help me get to. (Updated: 08/19/2024)- continues  3. I will connect with CCC team at least once a month to discuss goal progression and address any other needs/concerns that may arise. (Updated: 08/23/2024)- continues  4. I will update CHW once received the [employer] mobility application and MTM transportation resources (including steps by steps) from [facility] team via mail. (Completed: received application from CHW per pt on 8/09/2024)  5. I will wait to hear from [facility] team regards to transportation service info. (Completed: 7/23/2024)  6. I will fill out my part (patient section) of the [employer]  Mobility Application and drop off the application to my PCP in the [facility] week of 8/12/2024 for Dr. Nicolas (PCP) to fill out the physician section. (Completed)  7. I will wait to received a copy of the metro mobility form send to me via mail. (Updated: 8/23/2024)- continues  8. I will wait to hear from [facility] Mobility Office for the next step. (Updated: 8/23/2024)- continues                                 Plan/Recommendations: The patient will continue working with Care Coordination to achieve goal(s) as above. CHW will continue outreaches at minimum every 30 days and will involve Lead CC as needed or if patient is ready to move to Maintenance. Lead CC will continue to monitor CHW outreaches and patient's progress to goal(s) every 6 weeks.     Plan of Care updated and sent to patient: Yes, via DAISHA Arnold   Social Work Care Coordinator   [facility]  [phone]

## 2024-09-13 NOTE — TELEPHONE ENCOUNTER
New Medication Request    Contacts       Contact Date/Time Type Contact Phone/Fax    09/13/2024 09:26 AM CDT Phone (Incoming) DavidPacheco (Self) [phone] (M)        What medication are you requesting?: A stronger pain medication    Reason for medication request: Had lumbar puncture on 9/10/24 and still in a lot of pain. Requesting stronger pain medication    Controlled Substance Agreement on file:   CSA -- Patient Level:    CSA: None found at the patient level.       Preferred Pharmacy:  [id number] [address] [employer]  [address] [place] [id number]  Phone: [phone] Fax: [phone]    Could we send this information to you in [facility] or would you prefer to receive a phone call?:   Patient would prefer a phone call   Okay to leave a detailed message?: Yes at Cell number on file:    Telephone Information:   Mobile [phone]   Mobile Not on file.     Message routed to Dr Nicolas for review    Francisca Baca RN  [facility]

## 2024-09-13 NOTE — LETTER
[facility]  Patient Centered Plan of Care  About Me:        Patient Name:  Pacheco Jimenez    YOB: 1990  Age:         33 year old   Brandi MRN:    [id number] Telephone Information:  Home Phone [phone]   Mobile [phone]   Mobile Not on file.   Home Phone Not on file.   Home Phone Not on file.       Address:  [address] [place] [address] Email address:  [email or web address]      Emergency Contact(s)    Name Relationship Lgl Grd Work Phone Home Phone Mobile Phone   1. KECIA JIMENEZ Mother    [phone]   2. FABIANA ABARCA Sister    [phone]           Primary language:  English     needed? No   [facility] [employer] [facility]:  [phone] op. 1  Other communication barriers:None    Preferred Method of Communication:     Current living arrangement: I live in a private home with family    Mobility Status/ Medical Equipment: Independent w/Device        Health Maintenance  Health Maintenance Reviewed: Due/Overdue     Overdue          JUL 17 2021 MEDICARE ANNUAL WELLNESS VISIT (Yearly)  Last completed: Jul 17, 2020 APR 7 2024 URINE DRUG SCREEN (Yearly)   Last ordered: Jun 14, 2024     SEP 1  2024 INFLUENZA VACCINE (1)  Last completed: Mar 27, 2024         Due Soon          SEP 14  2024 A1C (Every 3 Months)  Last completed: Jun 14, 2024         My Access Plan  Medical Emergency 911   Primary Clinic Line [facility] - [phone]   24 Hour Appointment Line [phone] or  [address] [place] [address] [phone]) (toll-free)   24 Hour Nurse Line [phone] (toll-free)   Preferred Urgent Care [facility], [phone]     Preferred Hospital No data recorded   Preferred Pharmacy [facility] - [place], [place] - [address]     Behavioral Health Crisis Line The National Suicide Prevention Lifeline at 1-129.292.8929 or Text/Call 778           My Care Team Members  Patient Care Team         Relationship Specialty  Notifications Start End    Lary Nicolas MD PCP - General Family Medicine  3/15/24     Phone: [phone] Fax: [phone]         [address] [facility] [address]    Marcial Rojas MD Physician Neurology  4/14/23     Phone: [phone] Fax: [phone]         [address] [place] [address]    Lary Nicolas MD Assigned PCP   4/15/23     Phone: [phone] Fax: [phone]         [address] [place] [address]    Lucas Hernandez [facility] Therapist Licensed Mental Health  3/15/24     LEIGH faxed to the ED 3/15/24    Phone: [phone] Fax: [phone] [address] [place] [address]    Milton Denise MD Psychiatrist Psychiatry  3/15/24     LEIGH faxed to the ED 3/15/24    Phone: [phone] Fax: [phone] [address] [facility] [address]    Breezy Pathak PENNIE Assigned MTM Pharmacist   4/23/24     Phone: [phone] Fax: [phone]         [address] [facility] [address]    Radha Michaud RD Registered Dietitian Diabetes Education  5/28/24     Phone: [phone] Fax: [phone]         [address] [place] [address]    Goldie Sloan OD MD Optometry  5/29/24     Phone: [phone] Fax: [phone] [address] [facility] [address]    Nancy Pineda CHW Community Health Worker  Admissions 6/19/24     Phone: [phone]         Romaine Bowser DPM Assigned Surgical Provider   7/23/24     Phone: [phone] Fax: [phone]         [address] [facility] [address] [facility] [address]    Eloina Diaz LSW Lead Care Coordinator  Admissions 7/31/24     Phone: [phone]         Charly Chu MD MD Pain Medicine  8/30/24     Phone: [phone] Fax: [phone]         [address] [place] [address]                My Care Plans  Self Management and Treatment Plan    Care Plan  Care Plan: Transportation       Problem: Lack of transportation       Goal: Establish reliable transportation       Start Date: 6/18/2024    This Visit's  Progress: 60% Recent Progress: 60%    Note:     Barriers: N/A  Strengths: Family support, waiver services  Patient expressed understanding of goal: Yes    Action steps to achieve this goal:  1. I will wait for the [employer] mobility to be mailed to my home and have my family assist me with completing the document if needed. (Resent week of 7/23/2024)- completed  2. I will reschedule my upcoming appts that I am unable to currently make due to transportation concerns and connect with my family for their support for appointments that they can help me get to. (Updated: 08/19/2024)- continues  3. I will connect with CCC team at least once a month to discuss goal progression and address any other needs/concerns that may arise. (Updated: 08/23/2024)- continues  4. I will update CHW once received the metro mobility application and MTM transportation resources (including steps by steps) from [facility] team via mail. (Completed: received application from CHW per pt on 8/09/2024)  5. I will wait to hear from [facility] team regards to transportation service info. (Completed: 7/23/2024)  6. I will fill out my part (patient section) of the [employer] Mobility Application and drop off the application to my PCP in the [facility] week of 8/12/2024 for Dr. Nicolas (PCP) to fill out the physician section. (Completed)  7. I will wait to received a copy of the metro mobility form send to me via mail. (Updated: 8/23/2024)- continues  8. I will wait to hear from [facility] for the next step. (Updated: 8/23/2024)- continues                              Action Plans on File:                       Advance Care Plans/Directives:   Advanced Care Plan/Directives on file:   No    Discussed with patient/caregiver(s):   Declined Further Information             My Medical and Care Information  Problem List   Patient Active Problem List   Diagnosis    Allergies    Vitamin D deficiency    Hyperlipidemia    Major Depression Severe Single  Episode    Arnold-Chiari malformation, type II (H)    Obstructive sleep apnea    Syringomyelia (H)    Epilepsy And Recurrent Seizures    Keloid scar of skin    Dysphagia    Recurrent occipital headache     Shunt    Neuropathic pain    Neurogenic bladder    Neurogenic bowel    Controlled substance agreement signed    Angular cheilitis    Cognitive deficits    Sleep disturbances    Hydrocephalus with operating  shunt (H)    Encounter for therapeutic drug monitoring    Diabetes mellitus, type 2 (H)    Morbid obesity (H)    Bleeds easily (H24)    Vitamin B12 deficiency (non anemic)    Spasticity    Abnormal LFTs (liver function tests)    Transportation insecurity    Weakness of both lower extremities    Encounter for immunization    Herpes keratitis of left eye    Cauda equina spinal cord injury, sequela      Current Medications and Allergies:  See printed Medication Report.    Care Coordination Start Date: 6/14/2024   Frequency of Care Coordination: monthly, more frequently as needed     Form Last Updated: 09/13/2024

## 2024-09-16 ENCOUNTER — ALLIED HEALTH/NURSE VISIT (OUTPATIENT)
Dept: FAMILY MEDICINE | Facility: CLINIC | Age: 34
End: 2024-09-16
Payer: COMMERCIAL

## 2024-09-16 DIAGNOSIS — E53.8 VITAMIN B12 DEFICIENCY (NON ANEMIC): Primary | ICD-10-CM

## 2024-09-16 PROCEDURE — 99207 PR NO CHARGE NURSE ONLY: CPT

## 2024-09-16 PROCEDURE — 96372 THER/PROPH/DIAG INJ SC/IM: CPT | Performed by: FAMILY MEDICINE

## 2024-09-16 RX ORDER — LAMOTRIGINE 100 MG/1
100 TABLET ORAL 2 TIMES DAILY
Qty: 180 TABLET | Refills: 3 | Status: SHIPPED | OUTPATIENT
Start: 2024-09-16 | End: 2024-09-17

## 2024-09-16 RX ORDER — GABAPENTIN 600 MG/1
TABLET ORAL
Qty: 210 TABLET | Refills: 0 | Status: SHIPPED | OUTPATIENT
Start: 2024-09-16 | End: 2024-09-17

## 2024-09-16 RX ORDER — ACETAMINOPHEN 500 MG
1000 TABLET ORAL 3 TIMES DAILY
Qty: 200 TABLET | Refills: 11 | Status: SHIPPED | OUTPATIENT
Start: 2024-09-16 | End: 2024-09-17

## 2024-09-16 RX ORDER — IBUPROFEN 800 MG/1
800 TABLET, FILM COATED ORAL 3 TIMES DAILY
Qty: 100 TABLET | Refills: 1 | Status: SHIPPED | OUTPATIENT
Start: 2024-09-16 | End: 2024-09-17

## 2024-09-16 RX ORDER — DULOXETIN HYDROCHLORIDE 60 MG/1
60 CAPSULE, DELAYED RELEASE ORAL DAILY
Qty: 90 CAPSULE | Refills: 4 | Status: SHIPPED | OUTPATIENT
Start: 2024-09-16 | End: 2024-09-17

## 2024-09-16 RX ADMIN — CYANOCOBALAMIN 1000 MCG: 1000 INJECTION, SOLUTION INTRAMUSCULAR; SUBCUTANEOUS at 11:50

## 2024-09-16 NOTE — TELEPHONE ENCOUNTER
Called patient.  Reviewed pain medication plan with patient, sent [employer] message with copy of instructions. Patient verbalizes understanding and agreement.  Has appointment with  neurology 9/19/24, will ask about LEIGH for [facility].  Patient hasn't heard from pain clinic--provided phone number for patient to call and schedule follow-up.    Deepti Abernathy RN  [facility]

## 2024-09-16 NOTE — TELEPHONE ENCOUNTER
Pain medication plan:  Gabapentin 1200 mg 7 AM, 1200 mg 2 pm, 1800 mg 10 pm  Tylenol 1000 mg (two 500 mg tabs), three times a day with gabapentin.  Duloxetine 60 mg daily  Lamotrigine 100 twice daily  (increase dose)  Ibuprofen 800 three times daily with food (new)  Turmeric 1000 mg twice daily (new)    Please have him sign LEIGH for HP. It looks like we need that to get his results on Care Everywhere.   All pain meds refilled. Has be been able to get in to his pain clinic yet.?  Neuropathic pain  -     gabapentin (NEURONTIN) 600 MG tablet; Take 2 tablets (1,200 mg) by mouth every morning AND 2 tablets (1,200 mg) daily at 2 pm AND 3 tablets (1,800 mg) at bedtime. Take three tablet at bedtime..  -     acetaminophen (TYLENOL) 500 MG tablet; Take 2 tablets (1,000 mg) by mouth 3 times daily. With gabapentin  -     DULoxetine (CYMBALTA) 60 MG capsule; Take 1 capsule (60 mg) by mouth daily.  -     lamoTRIgine (LAMICTAL) 100 MG tablet; Take 1 tablet (100 mg) by mouth 2 times daily.  -     ibuprofen (ADVIL/MOTRIN) 800 MG tablet; Take 1 tablet (800 mg) by mouth 3 times daily.  -     Turmeric 5-1000 MG CAPS; Take 1 Capful by mouth 2 times daily.

## 2024-09-17 RX ORDER — CYANOCOBALAMIN 1000 UG/ML
1000 INJECTION, SOLUTION INTRAMUSCULAR; SUBCUTANEOUS WEEKLY
Qty: 8 ML | Refills: 0 | Status: SHIPPED | OUTPATIENT
Start: 2024-09-17

## 2024-09-17 RX ORDER — GABAPENTIN 600 MG/1
TABLET ORAL
Qty: 210 TABLET | Refills: 0 | Status: SHIPPED | OUTPATIENT
Start: 2024-09-17

## 2024-09-17 RX ORDER — LAMOTRIGINE 100 MG/1
100 TABLET ORAL 2 TIMES DAILY
Qty: 180 TABLET | Refills: 3 | Status: SHIPPED | OUTPATIENT
Start: 2024-09-17

## 2024-09-17 RX ORDER — DULOXETIN HYDROCHLORIDE 60 MG/1
60 CAPSULE, DELAYED RELEASE ORAL DAILY
Qty: 90 CAPSULE | Refills: 4 | Status: SHIPPED | OUTPATIENT
Start: 2024-09-17

## 2024-09-17 RX ORDER — IBUPROFEN 800 MG/1
800 TABLET, FILM COATED ORAL 3 TIMES DAILY
Qty: 100 TABLET | Refills: 1 | Status: SHIPPED | OUTPATIENT
Start: 2024-09-17

## 2024-09-17 RX ORDER — ACETAMINOPHEN 500 MG
1000 TABLET ORAL 3 TIMES DAILY
Qty: 200 TABLET | Refills: 11 | Status: SHIPPED | OUTPATIENT
Start: 2024-09-17

## 2024-09-17 NOTE — TELEPHONE ENCOUNTER
Patient viewed result message via [employer] 9/3/24. Writer contacted patient to discuss any questions and schedule weekly RN visits.    Per patient, he does not have transportation to come to clinic for weekly injections. Patient wonders if B12 can be sent to pended pharmacy to complete weekly injections at home--patient verbalizes he is comfortable doing these at home.    Deepti Abernathy RN  [facility]

## 2024-09-17 NOTE — TELEPHONE ENCOUNTER
Contacted patient with unrelated message.    Patient relayed he does not have consistent transportation, prefers [facility] mail-order pharmacy. Writer asked if patient would prefer pain medications to be sent to  mail-order as well, patient agrees.    Deepti Abernathy RN  [facility]

## 2024-09-17 NOTE — TELEPHONE ENCOUNTER
Rx re-sent to mail order.    [facility] - [place], [place] - [address]

## 2024-09-18 ENCOUNTER — TELEPHONE (OUTPATIENT)
Dept: FAMILY MEDICINE | Facility: CLINIC | Age: 34
End: 2024-09-18
Payer: COMMERCIAL

## 2024-09-18 DIAGNOSIS — E11.9 TYPE 2 DIABETES MELLITUS WITHOUT COMPLICATION, WITH LONG-TERM CURRENT USE OF INSULIN (H): ICD-10-CM

## 2024-09-18 DIAGNOSIS — Z79.4 TYPE 2 DIABETES MELLITUS WITHOUT COMPLICATION, WITH LONG-TERM CURRENT USE OF INSULIN (H): ICD-10-CM

## 2024-09-18 NOTE — TELEPHONE ENCOUNTER
New Medication Request        What medication are you requesting?: Ozempic 2 MG/Dose 8 MG/3 ML Pen    Reason for medication request: N/A    Have you taken this medication before?: N/A    Controlled Substance Agreement on file:   CSA -- Patient Level:    CSA: None found at the patient level.         Patient offered an appointment? No    Preferred Pharmacy:    [facility]  [address] [address]  [phone]  Fax # [phone]    Could we send this information to you in [employer] or would you prefer to receive a phone call?:   Patient would prefer a phone call   Okay to leave a detailed message?: Yes at Home number on file [phone] (home)

## 2024-09-18 NOTE — TELEPHONE ENCOUNTER
New Medication Request        What medication are you requesting?: Turmeric    Reason for medication request: needed for pain    Have you taken this medication before?: Yes: It appeared provider sent refill on 9/17/24. Telephone number of pharmacy provided to patient to call for an update of arrival.    Controlled Substance Agreement on file:   CSA -- Patient Level:    CSA: None found at the patient level.            Preferred Pharmacy:        [facility] - [place], [place] - [address]   [address] [facility] [address]  Phone: [phone] Fax: [phone]      Gino Tipton RN  [facility]

## 2024-09-20 ENCOUNTER — TELEPHONE (OUTPATIENT)
Dept: FAMILY MEDICINE | Facility: CLINIC | Age: 34
End: 2024-09-20
Payer: COMMERCIAL

## 2024-09-20 DIAGNOSIS — E53.8 VITAMIN B12 DEFICIENCY (NON ANEMIC): Primary | ICD-10-CM

## 2024-09-20 RX ORDER — DEXAMETHASONE SODIUM PHOSPHATE 4 MG/ML
INJECTION, SOLUTION INTRAMUSCULAR; INTRAVENOUS
Status: CANCELLED | OUTPATIENT
Start: 2024-09-20

## 2024-09-20 NOTE — TELEPHONE ENCOUNTER
"There is an rx for 23ga x 1\" syringes; is pt wanting a different size?  Is this for B12?      Holley DEAN BSN, PHN, RN-[facility]  [phone]    "

## 2024-09-20 NOTE — TELEPHONE ENCOUNTER
General Call      Reason for Call:  Pharmacy change    What are your questions or concerns:  [facility] faxed a notice of change for pharmacy for pt. Please send all prescripton to [facility].    Date of last appointment with provider: 8/29/2024

## 2024-09-20 NOTE — TELEPHONE ENCOUNTER
Pt is requesting new rx for    Bd tb syringe 27g x 1/2'1    Did not see on active med list please verify and send new rx. Thank you!     [facility]/mail pharmacy  [phone]

## 2024-09-22 NOTE — TELEPHONE ENCOUNTER
Clinic RN: Please contact patient because  Patient has many meds on his  medication list and we do not want to guess what he wants refills orders on.  In the past when this was done for a patient that patient picked up a medication that they did NOT want and then asked us to pay for it because they could not return it.   Please call patient and ask him to list specifically what he wants refilled.

## 2024-09-26 ENCOUNTER — PATIENT OUTREACH (OUTPATIENT)
Dept: NURSING | Facility: CLINIC | Age: 34
End: 2024-09-26
Payer: COMMERCIAL

## 2024-09-26 ENCOUNTER — PATIENT OUTREACH (OUTPATIENT)
Dept: CARE COORDINATION | Facility: CLINIC | Age: 34
End: 2024-09-26

## 2024-09-26 NOTE — CONFIDENTIAL NOTE
I printed out consent to get records from  and [place]. Is there anywhere else we need records from? Can he get these signed?

## 2024-09-26 NOTE — PROGRESS NOTES
Clinic Care Coordination Contact:  Community Health Worker Follow Up    Reason: [facility] CHW Follow Up Call (follow up current goal's)    Care Gaps:   Health Maintenance Due   Topic Date Due    MEDICARE ANNUAL WELLNESS VISIT  07/17/2021    URINE DRUG SCREEN  04/07/2024    INFLUENZA VACCINE (1) 09/01/2024    COVID-19 Vaccine (6 - 2024-25 season) 09/01/2024    A1C  09/14/2024     Defer to next outreach due to the patient time. Patient is aware to connect with pcp office with any questions.      Care Plan:   Care Plan: Transportation       Problem: Lack of transportation       Goal: Establish reliable transportation       Start Date: 6/18/2024    This Visit's Progress: 60% Recent Progress: 60%    Note:     Barriers: N/A  Strengths: Family support, waiver services  Patient expressed understanding of goal: Yes    Action steps to achieve this goal:  1. I will wait for the [employer] mobility to be mailed to my home and have my family assist me with completing the document if needed. (Resent week of 7/23/2024)- completed  2. I will reschedule my upcoming appts that I am unable to currently make due to transportation concerns and connect with my family for their support for appointments that they can help me get to. (Updated: 09/26/2024)- continues  3. I will connect with CCC team at least once a month to discuss goal progression and address any other needs/concerns that may arise. (Updated: 09/24/2024)- continues  4. I will update CHW once received the metro mobility application and MTM transportation resources (including steps by steps) from [facility] team via mail. (Completed: received application from CHW per pt on 8/09/2024)  5. I will wait to hear from [facility] team regards to transportation service info. (Completed: 7/23/2024)  6. I will fill out my part (patient section) of the [employer] Mobility Application and drop off the application to my PCP in the [facility] week of 8/12/2024 for Dr. Nicolas (PCP) to fill out  "the physician section. (Completed)  7. I will wait to received a copy of the metro mobility form send to me via mail. (Completed- received per pt 9/26/2024)  8. I will wait to hear from [employer] [facility]ility Office for the next step. (Updated: 9/26/2024)- continues                            Care Plan: General- Seek medication review/education       Problem: HP GENERAL PROBLEM       Goal: General Goal - I would like to seek medication review/education with care teams by the next 1-2 week.       Start Date: 9/26/2024    This Visit's Progress: 40%    Note:     Measure of Success: NA  Barriers: Take med wrong.   Strengths: Seek medication education/review with CCC RN 9/26/2024 and seek further advised.   Patient expressed understanding of goal: yes    Action steps to achieve this goal:  1. I will attend appt on 9/26/2024 at 3:00PM with CCC RN via phone visit to seek med review/education and further advise.                                  Chart reviewed:  -Tomorrow, 9/27/2022 is the pt birthday.   -Patient last office visit with PCP on 9/06/2024 via \"video\" per appt desk reviewed.    Appt desk reviewed:  Pt future appt with PCP per appt desk reviewed.  Name: Pacheco David MRN: [id number]     Date: 9/5/2025 Status: Scheduled     Time: 9:20 AM Length: 40     Visit Type: [facility] MEDICARE ANNUAL WELLNESS [1683] Copay: $0.00     Provider: Lary Nicolas MD Department: [facility]       Notes: My nerve pain status and what is going on with me and a few other problems     Patient Outreach Discussion:   [facility] CHW was able to connect with patient today regard to reason above. Wish pt for a early birthday. Check in how patient is doing and any questions or concerns at this time.    Pt couldn't get an appt sooner with PCP per pt. CHW encouraged pt to schedule appt with PCP, PCP providers team, and CCC RN is available if need. Pt appt schedule with CCC RN today, 9/26/2024 at 3:00PM via phone visit per patient preferences to seek " "med education/review and further suggestions. Set new goal. Patient confirmed that he have no thought of hurting self, or other and or killing self or other.    Patient shared:  -Talked with my psychiatrist yesterday. Taking my medication wrong and this is why my pain continues.   -I waited \"8 to 10\" hours to take the next pain medication but my pain comes every 5 to 6 hours.   -Been feeling \"intense\" with pain. This make me feel suicidal thought but I do not have suicidal thought or plan of harming myself.  -Was in the emergency. Locked up in the room. Can't even use the bathroom. Used the commode. Was not attempted to hurt myself or killing myself or others. Was not details about my pain level.   -Pain level increased after 3 years because medication tolerable level change. This affect my mental stage and ruined myself.   -Have appt at 11:00AM with my neuro doctor today. Plan to attend appt.     CHW suggested patient for the following:  -Pt connect CCC/CHW with any additional resources need and PCP office for scheduling appt.   -Connect with 911 emergency line, go to the emergency dept, or seeking help for any crisis situation, mood change or getting worsen.  -Seek CCC RN for further advised in regard to follow up appt with care teams, and med education.   -Patient attend all future appt schedule as recommendation. Pt connect with assigned clinic(s)/Care teams with any questions.     CHW Next Follow-up: Goal's follow up. Informed patient of due care gaps (if any).     Outreach frequency: monthly      CHW Plan:   -Patient attend appt: 9/26/2024 at 3:00PM with CCC RN via phone to seek further med review/education and advise.   -Next CHW outreach plan: 1 month to monitor the progression of their goal(s).        "

## 2024-09-26 NOTE — PROGRESS NOTES
Clinic Care Coordination Contact  Follow Up Progress Note      Assessment: RN CC spoke with pt   Pt shared that neurosurgeon shared that the procedure recommended is very risky but still recommend he consider it     He notes that he needs to talk over options with family since it may be life risk    Postponed visit with RN CC till tomorrow morning   Consider scheduling visit with PCP to discuss     Plan: RN CC will call tomorrow

## 2024-09-26 NOTE — TELEPHONE ENCOUNTER
I printed out consent to get records from  and [place]. Is there anywhere else we need records from? Can he get these signed?

## 2024-09-26 NOTE — PROGRESS NOTES
Clinic Care Coordination Contact:    Coordinate Care:  CHW coordinated with CCC RN and CCC SW/lead care coordinator today, 9/26/2024 at [facility] CCC POD meeting via Teams regards to the pt appt schedule today with CCC RN via phone visit, and patient shared below.     CHW Next Follow-up: Goal's follow up. Informed patient of due care gaps (if any).     Outreach frequency: monthly      CHW Plan:   -Patient attend appt: 9/26/2024 at 3:00PM with CCC RN via phone to seek further med review/education and advise.   -Next CHW outreach plan: 1 month to monitor the progression of their goal(s).

## 2024-09-27 ENCOUNTER — PATIENT OUTREACH (OUTPATIENT)
Dept: NURSING | Facility: CLINIC | Age: 34
End: 2024-09-27
Payer: COMMERCIAL

## 2024-09-27 NOTE — PROGRESS NOTES
"Clinic Care Coordination Contact  Follow Up Progress Note      Assessment: Pt called back   Pt notes that he needs to enroll in medicaid  Pt notes he has signed consent at [facility] on 9.19 to send records to PCP for review.      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 This is causing worsening neurological deficits and severe paraparesis. He recommends revision of your chiari decompression surgery.  \" This is a risky surgery\"     Follow up with Dr. Robert Guadarrama if you decide to proceed with surgery  \"The recovery time is a long process, and only some people can make it through the process and it is really hard\"     If he decided not to do the surgery likely lead to paralysis of legs and arms - 68% survival rate without procedure.     Talked about taking a moment to celebrate today - Birthday   Enjoy sunshine and hoping to spend some time with family this afternoon.     He will take a look at the application on line for medicaid , RN CC set up follow up call with [facility] to see if any questions with application or next steps - if FRW referral needed for support       Plan: [facility] CC outreach schedule for next week  RN CC will be available as needed   "

## 2024-09-27 NOTE — PROGRESS NOTES
Clinic Care Coordination Contact  [facility]/Voicemail    Clinical Data: Care Coordinator Outreach      Chart review notes PCP request for records and consent - left message on  to request signature if needd     Left message on patient's voicemail with call back information and requested return call.    Plan:  RNCC will follow up with lead CC next week and discuss next steps

## 2024-09-30 ENCOUNTER — PATIENT OUTREACH (OUTPATIENT)
Dept: CARE COORDINATION | Facility: CLINIC | Age: 34
End: 2024-09-30
Payer: COMMERCIAL

## 2024-09-30 DIAGNOSIS — Z71.89 OTHER SPECIFIED COUNSELING: Primary | Chronic | ICD-10-CM

## 2024-09-30 NOTE — TELEPHONE ENCOUNTER
Please clarify:  Vitamin B12 injections should be IM, so he needs a longer needle.  Is he doing them at home or coming to the clinic to have this done?  He was supposed to be doing it weekly until the end of October. Is this what is happening?

## 2024-09-30 NOTE — PROGRESS NOTES
Clinic Care Coordination Contact:  Community Health Worker Follow Up    Reason:   Return patient call    Care Gaps:   Health Maintenance Due   Topic Date Due    MEDICARE ANNUAL WELLNESS VISIT  07/17/2021    URINE DRUG SCREEN  04/07/2024    INFLUENZA VACCINE (1) 09/01/2024    COVID-19 Vaccine (6 - 2024-25 season) 09/01/2024    A1C  09/14/2024     Defer to next outreach due to reason of call today.    Care Plan:   Care Plan: Transportation       Problem: Lack of transportation       Goal: Establish reliable transportation       Start Date: 6/18/2024    This Visit's Progress: 60% Recent Progress: 60%    Note:     Barriers: N/A  Strengths: Family support, waiver services  Patient expressed understanding of goal: Yes    Action steps to achieve this goal:  1. I will wait for the [employer] mobility to be mailed to my home and have my family assist me with completing the document if needed. (Resent week of 7/23/2024)- completed  2. I will reschedule my upcoming appts that I am unable to currently make due to transportation concerns and connect with my family for their support for appointments that they can help me get to. (Updated: 09/26/2024)- continues  3. I will connect with CCC team at least once a month to discuss goal progression and address any other needs/concerns that may arise. (Updated: 09/24/2024)- continues  4. I will update CHW once received the metro mobility application and MTM transportation resources (including steps by steps) from [facility] team via mail. (Completed: received application from CHW per pt on 8/09/2024)  5. I will wait to hear from [facility] team regards to transportation service info. (Completed: 7/23/2024)  6. I will fill out my part (patient section) of the Metro Mobility Application and drop off the application to my PCP in the [facility] week of 8/12/2024 for Dr. Nicolas (PCP) to fill out the physician section. (Completed)  7. I will wait to received a copy of the metro mobility  "form send to me via mail. (Completed- received per pt 9/26/2024)  8. I will wait to hear from [facility] Mobility Office for the next step. (Updated: 9/26/2024)- continues    (Updated: 9/30/2024- no update due to reason of call today)                            Care Plan: General- Seek medication review/education       Problem: HP GENERAL PROBLEM       Goal: General Goal - I would like to seek medication review/education with care teams by the next 1-2 week.       Start Date: 9/26/2024    This Visit's Progress: 60% Recent Progress: 40%    Note:     Measure of Success: NA  Barriers: Take med wrong.   Strengths: Seek medication education/review with CCC RN 9/26/2024 and seek further advised.   Patient expressed understanding of goal: yes    Action steps to achieve this goal:  1. I will attend appt on 9/26/2024 at 3:00PM with CCC RN via phone visit to seek med review/education and further advise. (Completed; per pt on 9/30/2024).  2. I will attend appt on 10/01/2024 at 9:00AM with [facility]  via phone visit per [facility] RN suggested to support with medicaid. (Updated: 9/30/2024)                                  Care Plan: General       Problem: HP GENERAL PROBLEM       Goal: General Goal - please update text       Start Date: 9/30/2024    This Visit's Progress: 10%    Note:     Measure of Success: NA  Barriers: Patient shared; Possible renewal issues; not eligible for \"medicaid\" per outgoing called conversation with Medicaid staff on 9/27/2024.   Strengths: Work with CCC SW/CHW team and FRW dept for further support.   Patient expressed understanding of goal: yes    Action steps to achieve this goal:  1. I will attend appt on 10/01/2024 at 9:00AM with [facility]  via phone visit per [facility] RN suggested to support with medicaid. (Updated: 9/30/2024)  2. I will wait to hear from [facility] FRW dept regard to the FRW referral placed on 9/302/2024 by CHW at my request. (Updated: 9/30/2024)  3. I will update status with CHW at the " "next outreach follow up.                              Patient Outreach Discussion:   [facility] CHW was able to connect with patient today regard to reason above. Check in how patient is doing and any questions or concerns at this time. Patient shared info below and set a new goal (above). Updated one current goal (above).    Patient shared:  -Spoke with VOLODYMYR Martinez on 9/27/2024. I remember the nurse suggested that I talk to someone for further support and connect with Medicaid.   -Called Medicaid right after I spoke with the nurse and that I'm not qualified. Will needs to fill out a form online to review eligibility per \"Medicaid staff.\"   -Couldn't renewal health care insurance. (FYI: no details shared per pt time).  -Doing good. No other questions at this time.     Appt reminder:   Patient is reminded to tomorrow,  10/01/2024 appt time at 9:00AM with [facility]  via phone visit per appt desk reviewed.  Name: Pacheco David MRN: [id number]     Date: 10/1/2024 Status: Scheduled     Time: 9:00 AM Length: 60     Visit Type: [facility] PHONE VISIT [3976] Copay: $0.00     Provider: [facility] CHIO Department: [facility] NURSE       Notes: follow up - pt needs to apply for medicaid and may need suppport     Referral place today:   CHW coordinate with [facility] RN and SW via Teams communication today. SW suggested place FRW referral in case of renewal issues.   Patient plan to attend appt tomorrow, 10/01/2024 at 9:00AM with [facility]  to seek support on Medicaid per pt.   Writer placed FRW referral for patient today, 9/30/2024. Patient is aware to wait to hear from FRW dept to discuss/review renewal and possible apply medicaid.     CHW suggested patient for the following:  -Pt connect CCC/CHW with any additional resources need and PCP office for scheduling appt.   -Pt attend tomorrow appt with [facility]  via phone visit to seek further support.   -Pt wait to hear from [facility] FRW team.     CHW Next Follow-up: Goal's follow up. Informed " patient of due care gaps (if any).     Outreach frequency: monthly      CHW Plan:   -Patient attend appt: 10/01/2024 at 9:00AM with [facility]  via phone visit.   -FRW referral: patient wait to hear from the FRW dept for further assistance.   -Next CHW outreach plan: 1 month to monitor the progression of their goal(s).

## 2024-09-30 NOTE — TELEPHONE ENCOUNTER
Writer called patient:  Person who picked up phone stated for writer to hold    Writer unable to stay on hold for patient/whomever picked up call.    Recall at another time.    JUN MalloyN, RN-[facility]

## 2024-09-30 NOTE — PROGRESS NOTES
Clinic Care Coordination Contact:    Today's date: 9/30/2024    Voicemail check today:   Caller:  Pacheco    Reason of called: unknown  Phone: [phone] per CHW caller ID reviewed  Date of voice message: 9/30/2024 at 12:23PM.      CHW Plan:   Return patient call

## 2024-10-01 ENCOUNTER — PATIENT OUTREACH (OUTPATIENT)
Dept: NURSING | Facility: CLINIC | Age: 34
End: 2024-10-01
Payer: COMMERCIAL

## 2024-10-01 ENCOUNTER — PATIENT OUTREACH (OUTPATIENT)
Dept: CARE COORDINATION | Facility: CLINIC | Age: 34
End: 2024-10-01

## 2024-10-01 NOTE — PROGRESS NOTES
Clinic Care Coordination Contact  Follow Up Progress Note      Assessment: CC SW called and connected with pt this afternoon regarding insurance and benefits    Care Gaps:    Health Maintenance Due   Topic Date Due    MEDICARE ANNUAL WELLNESS VISIT  07/17/2021    URINE DRUG SCREEN  04/07/2024    INFLUENZA VACCINE (1) 09/01/2024    COVID-19 Vaccine (6 - 2024-25 season) 09/01/2024    A1C  09/14/2024       Postponed to a later date     Care Plans  Care Plan: Transportation       Problem: Lack of transportation       Goal: Establish reliable transportation       Start Date: 6/18/2024    This Visit's Progress: 60% Recent Progress: 60%    Note:     Barriers: N/A  Strengths: Family support, waiver services  Patient expressed understanding of goal: Yes    Action steps to achieve this goal:  1. I will wait for the [employer] mobility to be mailed to my home and have my family assist me with completing the document if needed. (Resent week of 7/23/2024)- completed  2. I will reschedule my upcoming appts that I am unable to currently make due to transportation concerns and connect with my family for their support for appointments that they can help me get to. (Updated: 09/26/2024)- continues  3. I will connect with CCC team at least once a month to discuss goal progression and address any other needs/concerns that may arise. (Updated: 09/24/2024)- continues  4. I will update CHW once received the metro mobility application and MTM transportation resources (including steps by steps) from [facility] team via mail. (Completed: received application from CHW per pt on 8/09/2024)  5. I will wait to hear from [facility] team regards to transportation service info. (Completed: 7/23/2024)  6. I will fill out my part (patient section) of the [employer] Mobility Application and drop off the application to my PCP in the [facility] week of 8/12/2024 for Dr. Nicolas (PCP) to fill out the physician section. (Completed)  7. I will wait to  "received a copy of the metro mobility form send to me via mail. (Completed- received per pt 9/26/2024)  8. I will wait to hear from [employer] Mobility Office for the next step. (Updated: 9/26/2024)- continues    (Updated: 9/30/2024- no update due to reason of call today)                            Care Plan: General- Seek medication review/education       Problem: HP GENERAL PROBLEM       Goal: General Goal - I would like to seek medication review/education with care teams by the next 1-2 week.       Start Date: 9/26/2024    This Visit's Progress: 60% Recent Progress: 40%    Note:     Measure of Success: NA  Barriers: Take med wrong.   Strengths: Seek medication education/review with CCC RN 9/26/2024 and seek further advised.   Patient expressed understanding of goal: yes    Action steps to achieve this goal:  1. I will attend appt on 9/26/2024 at 3:00PM with CCC RN via phone visit to seek med review/education and further advise. (Completed; per pt on 9/30/2024).  2. I will attend appt on 10/01/2024 at 9:00AM with [facility]  via phone visit per [facility] RN suggested to support with medicaid. (Updated: 9/30/2024)                                  Care Plan: General       Problem: HP GENERAL PROBLEM       Goal: General Goal - please update text       Start Date: 9/30/2024    This Visit's Progress: 10%    Note:     Measure of Success: NA  Barriers: Patient shared; Possible renewal issues; not eligible for \"medicaid\" per outgoing called conversation with Medicaid staff on 9/27/2024.   Strengths: Work with CCC SW/CHW team and FRW dept for further support.   Patient expressed understanding of goal: yes    Action steps to achieve this goal:  1. I will attend appt on 10/01/2024 at 9:00AM with [facility]  via phone visit per [facility] RN suggested to support with medicaid. (Updated: 9/30/2024)  2. I will wait to hear from [facility] FRW dept regard to the FRW referral placed on 9/302/2024 by CHW at my request. (Updated: 9/30/2024)  3. " I will update status with CHW at the next outreach follow up.                              CC SW called and connected with pt this morning to discuss the concerns he had regarding his insurance. Pt reports that he currently is enrolled in medicare part A & B. Pt was wanting to see about changing his coverage (see if eligible for MA) as some of his providers that he sees will not be able to follow up with them due to high co-pays. FRW referral was placed by CHW. SW to follow up with assigned FRW and notify of pt's interest in applying for SNAP benefits. SW to also place FRN referral as pt identified having food scarcity.     Intervention/Education provided during outreach: Cert Pop, SNAP, FRN, FRW    Plan: FRW to reach out to pt in the next week regarding insurance and SNAP. SW to place an FRN referral for additional food support    Care Coordinator will follow up in 4 weeks per standard pt outreach    DAISHA Cross   Social Work Care Coordinator   [facility]  [phone]

## 2024-10-01 NOTE — Clinical Note
Blair Calhoun,  I have a pt that is experiencing food insecurity and was interested in connecting with you to learn more about some of his options.  Thank you,  Eloina Diaz, [facility]  Social Work Care Coordinator  [facility] [phone]

## 2024-10-01 NOTE — PROGRESS NOTES
Food Resource Navigator Contact    FRN - Initial Outreach    Reason for call: Type 2 Diabetes    Food Insecurity: High Risk (8/29/2024)    Food Insecurity     Within the past 12 months, did you worry that your food would run out before you got money to buy more?: Yes     Within the past 12 months, did the food you bought just not last and you didn t have money to get more?: Yes     Housing Stability: Low Risk  (8/29/2024)    Housing Stability     Do you have housing? : Yes     Are you worried about losing your housing?: No     Financial Resource Strain: Unknown (8/29/2024)    Financial Resource Strain     Within the past 12 months, have you or your family members you live with been unable to get utilities (heat, electricity) when it was really needed?: Patient declined     Transportation Needs: High Risk (8/29/2024)    Transportation Needs     Within the past 12 months, has lack of transportation kept you from medical appointments, getting your medicines, non-medical meetings or appointments, work, or from getting things that you need?: Yes       The patient was provided with the following food resources:  [employer] (Medically Tailored Meals)  Community food resources    The patient was provided the following community resources:  None    I have discussed the following goals with the patient: Pacheco to sign LEIGH for [employer] and return via mail to N[facility] will then submit application for medically tailored meal delivery.    Spent 28 minutes in consult with the patient.     Unique Holland   [facility] Food Resource Navigator  [employer] [facility] [employer]   [phone]

## 2024-10-02 ENCOUNTER — TELEPHONE (OUTPATIENT)
Dept: FAMILY MEDICINE | Facility: CLINIC | Age: 34
End: 2024-10-02
Payer: COMMERCIAL

## 2024-10-02 NOTE — TELEPHONE ENCOUNTER
Called and spoke to patient and he does not want rx transferred. He spoke with a RN already, unable to see who he spoke to

## 2024-10-02 NOTE — TELEPHONE ENCOUNTER
Remaining refills transferred.  Please notify patient.  JUN DonisN, PHN, RN-[facility]  [phone]

## 2024-10-02 NOTE — TELEPHONE ENCOUNTER
Patient returned call.  Please refer to encounter dated 10/2/24.  Encounter closed.    Gino Tipton RN  [facility]

## 2024-10-02 NOTE — TELEPHONE ENCOUNTER
11/5/2024 11:20 AM OFFICE VISIT 20 min [facility] FAMILY MEDICINE/OB Lary Nicolas MD   Location Instructions:    [facility] is located at [address] [place] [address] in [place], at the intersection of [facility]. This is one block south of the [facility] [address] [facility]. Free parking is available in the lot directly north of the clinic across [facility]. The clinic is near stops along bus [address] 3 and [address].     \

## 2024-10-03 ENCOUNTER — PATIENT OUTREACH (OUTPATIENT)
Dept: CARE COORDINATION | Facility: CLINIC | Age: 34
End: 2024-10-03
Payer: COMMERCIAL

## 2024-10-03 DIAGNOSIS — F32.2 MAJOR DEPRESSIVE DISORDER, SINGLE EPISODE, SEVERE (H): ICD-10-CM

## 2024-10-03 RX ORDER — ARIPIPRAZOLE 2 MG/1
2 TABLET ORAL DAILY
Qty: 30 TABLET | Refills: 0 | Status: SHIPPED | OUTPATIENT
Start: 2024-10-03

## 2024-10-03 NOTE — PROGRESS NOTES
Clinic Care Coordination Contact    Situation: Patient chart reviewed by care coordinator.    Background: RN CC review for status update     Assessment: Pt engaged with FRN and identified to work with FRW    Plan/Recommendations: [facility] CC lead, RN CC will be available to support pt during planning for upcoming procedure as needed

## 2024-10-08 ENCOUNTER — HOSPITAL ENCOUNTER (OUTPATIENT)
Dept: GENERAL RADIOLOGY | Facility: HOSPITAL | Age: 34
Discharge: HOME | End: 2024-10-08
Attending: FAMILY MEDICINE
Payer: COMMERCIAL

## 2024-10-08 ENCOUNTER — TELEPHONE (OUTPATIENT)
Dept: FAMILY MEDICINE | Facility: CLINIC | Age: 34
End: 2024-10-08
Payer: COMMERCIAL

## 2024-10-08 ENCOUNTER — HOSPITAL ENCOUNTER (OUTPATIENT)
Dept: ULTRASOUND IMAGING | Facility: HOSPITAL | Age: 34
Discharge: HOME | End: 2024-10-08
Attending: FAMILY MEDICINE
Payer: COMMERCIAL

## 2024-10-08 DIAGNOSIS — M79.671 FOOT PAIN, RIGHT: Primary | ICD-10-CM

## 2024-10-08 DIAGNOSIS — M79.671 FOOT PAIN, RIGHT: ICD-10-CM

## 2024-10-08 PROCEDURE — 73630 X-RAY EXAM OF FOOT: CPT | Mod: RT

## 2024-10-08 PROCEDURE — 76882 US LMTD JT/FCL EVL NVASC XTR: CPT | Mod: RT

## 2024-10-08 NOTE — TELEPHONE ENCOUNTER
First attempt: Left message requesting patient return call. Please relay message from Dr. Nicolas if patient calls back.    Deepti Abernathy RN  [facility]      ----- Message from Lary Nicolas sent at 10/8/2024  2:33 PM CDT -----  Please let Pacheco know:  The lump on his foot is some swollen tissue. No sign of blood clot.  If it's not bothering him, we can leave it alone.   If it's bothering him, the next step would be to do an MRI to get more info about what it is.     Future Appointments  11/5/2024  11:20 AM   Lary Nicolas MD         [facility]  9/5/2025   9:20 AM    L[facility] Nicolas MD         [facility]

## 2024-10-10 ENCOUNTER — TELEPHONE (OUTPATIENT)
Dept: FAMILY MEDICINE | Facility: CLINIC | Age: 34
End: 2024-10-10

## 2024-10-10 ENCOUNTER — TELEPHONE (OUTPATIENT)
Dept: FAMILY MEDICINE | Facility: CLINIC | Age: 34
End: 2024-10-10
Payer: COMMERCIAL

## 2024-10-10 NOTE — TELEPHONE ENCOUNTER
FYI - Status Update    Who is Calling: patient    Update: Patient is calling in regards to radiology results. He states there is an overgrowth of tissue in the foot and was instructed to schedule an MRI if pain continued. Patient's family brought up medical history in the family and mentioned heavy history of gout and advised patient to bring awareness of this. Requesting a call back to discuss.    Does caller want a call/response back: Yes     Could we send this information to you in [employer] or would you prefer to receive a phone call?:   Patient would prefer a phone call   Okay to leave a detailed message?: Yes at Cell number on file:    Telephone Information:   Mobile [phone]   Mobile Not on file.

## 2024-10-10 NOTE — TELEPHONE ENCOUNTER
General Call    Contacts       Contact Date/Time Type Contact Phone/Fax    10/10/2024 03:43 PM CDT Phone (Incoming) Pacheco David (Self) [phone] (M)          Reason for Call:  Patient called to inform that he is out of food and looking for food resources. Patient stated that they have passed out a couple of times due to starving. Per patient, care coordinator said that they would get back to patient in regards to some food resources but  have not heard back. Patient states that this is urgent. Patient would like a call back asap.     Could we send this information to you in [employer] or would you prefer to receive a phone call?:   Patient would prefer a phone call   Okay to leave a detailed message?: Yes at Cell number on file:    Telephone Information:   Mobile [phone]   Mobile Not on file.

## 2024-10-10 NOTE — TELEPHONE ENCOUNTER
Dr. Nicolas - patient reports intermittent shooting pain. Patient rates pain 5-6/10. MRI pended below with MRI questions answered. Appreciate review/edits and signing for next steps in POC.    JUN DolanN, RN (she/her)  [facility] RN

## 2024-10-11 ENCOUNTER — PATIENT OUTREACH (OUTPATIENT)
Dept: CARE COORDINATION | Facility: CLINIC | Age: 34
End: 2024-10-11
Payer: COMMERCIAL

## 2024-10-11 NOTE — PROGRESS NOTES
Food Resource Navigator Contact    FRCHUNG - Follow Up    Reason for call: Type 2 Diabetes  Other: food insecurity     Intervention and Education during outreach: Talked with Pacheco to see if he received the LEIGH for [employer] meal delivery. He said he filled it out and sent it back. YENIFER has not received it yet. Pacheco states that he's been running out of food and taking his medications on an empty stomach and having reactions. He did discuss this with clinic yesterday, 10/10.     Food Resource Navigator Plan: YENIFER put in a one time Instacart food delivery with Pacheco today to ensure he has food in the home over the weekend. Discussed that if he's running out of food to let the clinic know. Also discussed that if he's having a medical emergency he may need to call 911.     I have discussed the following goals with the patient: Pacheco to let clinic know if he's running low on food to ensure he isn't taking medications on an empty stomach. YENIFER still working on getting meal delivery set up. Sent Instacart delivery today.     Spent 12 minutes in consult with the patient.     Unique Holland   [facility] Food Resource Navigator  [facility]   [phone]

## 2024-10-11 NOTE — TELEPHONE ENCOUNTER
I think I'd like him to see podiatry first. If they want an MRI they can order it. But he doesn't need any unnecessary tests (he's already spending enough time and money on health care). Referral made. Please help him schedule.

## 2024-10-11 NOTE — PROGRESS NOTES
Clinic Care Coordination Contact  Follow Up Progress Note      Assessment: CC SW returning pt's call    Care Gaps:    Health Maintenance Due   Topic Date Due    MEDICARE ANNUAL WELLNESS VISIT  07/17/2021    URINE DRUG SCREEN  04/07/2024    INFLUENZA VACCINE (1) 09/01/2024    COVID-19 Vaccine (6 - 2024-25 season) 09/01/2024    A1C  09/14/2024       Postponed to a later date due to nature of conversation     Care Plans  Care Plan: Transportation       Problem: Lack of transportation       Goal: Establish reliable transportation       Start Date: 6/18/2024    This Visit's Progress: 60% Recent Progress: 60%    Note:     Barriers: N/A  Strengths: Family support, waiver services  Patient expressed understanding of goal: Yes    Action steps to achieve this goal:  1. I will wait for the [facility]erral [employer] (PRN)ro mobility to be mailed to my home and have my family assist me with completing the document if needed. (Resent week of 7/23/2024)- completed  2. I will reschedule my upcoming appts that I am unable to currently make due to transportation concerns and connect with my family for their support for appointments that they can help me get to. (Updated: 09/26/2024)- continues  3. I will connect with CCC team at least once a month to discuss goal progression and address any other needs/concerns that may arise. (Updated: 09/24/2024)- continues  4. I will update CHW once received the metro mobility application and MTM transportation resources (including steps by steps) from [facility] team via mail. (Completed: received application from CHW per pt on 8/09/2024)  5. I will wait to hear from [facility] team regards to transportation service info. (Completed: 7/23/2024)  6. I will fill out my part (patient section) of the Metro Mobility Application and drop off the application to my PCP in the [facility] week of 8/12/2024 for Dr. Nicolas (PCP) to fill out the physician section. (Completed)  7. I will wait to received a copy of the metro  "mobility form send to me via mail. (Completed- received per pt 9/26/2024)  8. I will wait to hear from [facility] Mobility Office for the next step. (Updated: 9/26/2024)- continues    (Updated: 9/30/2024- no update due to reason of call today)                            Care Plan: General- Seek medication review/education       Problem: HP GENERAL PROBLEM       Goal: General Goal - I would like to seek medication review/education with care teams by the next 1-2 week.       Start Date: 9/26/2024    This Visit's Progress: 60% Recent Progress: 40%    Note:     Measure of Success: NA  Barriers: Take med wrong.   Strengths: Seek medication education/review with CCC RN 9/26/2024 and seek further advised.   Patient expressed understanding of goal: yes    Action steps to achieve this goal:  1. I will attend appt on 9/26/2024 at 3:00PM with CCC RN via phone visit to seek med review/education and further advise. (Completed; per pt on 9/30/2024).  2. I will attend appt on 10/01/2024 at 9:00AM with [facility]  via phone visit per [facility] RN suggested to support with medicaid. (Updated: 9/30/2024)                                  Care Plan: General       Problem: HP GENERAL PROBLEM       Goal: General Goal - please update text       Start Date: 9/30/2024    This Visit's Progress: 10%    Note:     Measure of Success: NA  Barriers: Patient shared; Possible renewal issues; not eligible for \"medicaid\" per outgoing called conversation with Medicaid staff on 9/27/2024.   Strengths: Work with CCC SW/CHW team and FRW dept for further support.   Patient expressed understanding of goal: yes    Action steps to achieve this goal:  1. I will attend appt on 10/01/2024 at 9:00AM with [facility]  via phone visit per [facility] RN suggested to support with medicaid. (Updated: 9/30/2024)  2. I will wait to hear from [facility] FRW dept regard to the FRW referral placed on 9/302/2024 by CHW at my request. (Updated: 9/30/2024)  3. I will update status with " CHW at the next outreach follow up.                              CC SW returned pt's call from yesterday this morning. Pt reports being out of food completely. Per chart review, FRN placed an instacart order for pt for the weekend. SW offered to connect pt with food sloan that may be able to provide delivery and pt states being interested. SW notified pt that there are food boxes/bag at the clinic that we can give out to pts and that if he needs one we can set it up to have a family member come in to grab it. Pt reports that his family's car gets a lot of use and with the cost of gas would not be able to go to the clinic to get it.    Intervention/Education provided during outreach: Food resources    Plan: FRN already connected with pt this morning for immediate food resources. SW to send pt some additional information for nearby food resources that may provide delivery. FRW active with pt for SNAP benefits    Care Coordinator will follow up in 4 weeks     DAISHA Cross   Social Work Care Coordinator   [facility]  [phone]

## 2024-10-16 NOTE — TELEPHONE ENCOUNTER
Attempt # 1  Called Phone # [phone]      Was Call answered? No     Non-detailed voicemail left on October 16, 2024 3:23 PM to call clinic at:: [phone]     On Call Back:     Please relay Dr. Nicolas's message below.      Thank you,  Jose Luis White, Triage RN [facility]  3:23 PM 10/16/2024

## 2024-10-17 ENCOUNTER — PATIENT OUTREACH (OUTPATIENT)
Dept: CARE COORDINATION | Facility: CLINIC | Age: 34
End: 2024-10-17

## 2024-10-17 ENCOUNTER — TELEPHONE (OUTPATIENT)
Dept: FAMILY MEDICINE | Facility: CLINIC | Age: 34
End: 2024-10-17

## 2024-10-17 NOTE — TELEPHONE ENCOUNTER
"Patient calling, \"I got a message from Goldie and I was wondering what the referral was for.\"    Reviewed chart, referral to podiatry to evaluate swollen, painful tissue on foot. Relayed results. Offered to help patient schedule while on the phone, patient declined. Scheduling requested routed to specialty scheduling--see 10/8/24 ARDEN Abernathy RN  [facility]    "

## 2024-10-17 NOTE — TELEPHONE ENCOUNTER
Patient returned call. Writer relayed 10/11 message from Dr. Nicolas, patient verbalizes understanding and agreement. Writer offered to assist with scheduling podiatry appt, patient would like someone to call him at another time to assist with this.    Specialty scheduling -  Please contact patient/mom to schedule with podiatry.    Deepti Abernathy RN  [facility]

## 2024-10-17 NOTE — TELEPHONE ENCOUNTER
#2 Call attempt. LM on VM for Pacheco and also LM on VM for mom (Olivia, CTC on file).     Goldie DEAN RN  [facility]

## 2024-10-17 NOTE — PROGRESS NOTES
Food Resource Navigator Contact    FRN - Follow Up    Reason for call: Type 2 Diabetes  Other: food insecurity     Intervention and Education during outreach: Pacheco called FRN back to discuss medically tailored meals with [employer] [place]. YENIFER still has not received LEIGH in the mail but Pacheco mailed it back weeks ago. Pacheco was having adverse reactions from taking medications on an empty stomach last week and in need of food. Care Cart food delivery was ordered 10/11/2024. Pacheco would like to start medically tailored meals and provides verbal auth to submit his application to [facility] as continuity of care in the setting of type 2 diabetes and food insecurity providing medically tailored meals to ensure he has food to take his medicines with.     Food Resource Navigator Plan: Submit [employer] medically tailored meals request and follow-up with patient 3 months after meal service has started.     Patient provided verbal consent for FRN to share contact information with [facility]: Yes  I have discussed the following goals with the patient: Pacheco to  the phone when [employer] calls to onboard him for medically tailored prepared meal delivery.     Spent 1 minutes in consult with the patient.     Unique Holland   [facility] Food Resource Navigator  Food is Medicine   [phone]

## 2024-10-17 NOTE — PROGRESS NOTES
Food Resource Navigator Contact      Clinical Data: Food Resource Navigator Outreach    Called today to discuss [employer] medically tailored meal delivery. Did leave a voicemail. Will plan to call back week of 10/21.     Unique Holland   [facility] Food Resource Navigator  Food is Medicine   [phone]

## 2024-10-18 ENCOUNTER — TELEPHONE (OUTPATIENT)
Dept: FAMILY MEDICINE | Facility: CLINIC | Age: 34
End: 2024-10-18
Payer: COMMERCIAL

## 2024-10-18 NOTE — TELEPHONE ENCOUNTER
Future Appointments 10/18/2024 - 4/16/2025        Date Visit Type Length Department Provider     11/4/2024 10:20 AM NEW FOOT/ANKLE 20 min [facility] PODIATRY Romaine Bowser DPM    Location Instructions:     We are located in the [facility] at [address], [address], [place], [place].&nbsp; Our building is located across the street from [facility] and offers free parking in our on-site lot. Please take the stairs or elevator to the second floor of the [facility] and check in at the  located in the Specialty Clinic, [address].              11/5/2024 11:20 AM OFFICE VISIT 20 min [facility] FAMILY MEDICINE/OB Lary Nicolas MD    Location Instructions:     [facility] is located at [address] [place] [address] in [place], at the intersection of [facility]. This is one block south of the [facility]. Free parking is available in the lot directly north of the clinic across [facility]. The clinic is near stops along bus [address] 3 and [address].                  No further actions, completing task.

## 2024-10-18 NOTE — TELEPHONE ENCOUNTER
Home Care is calling regarding an established patient with [facility].       Requesting orders from: Lary Nicolas  Provider is following patient: Yes  Is this a 60-day recertification request?  No    Orders Requested    Skilled Nursing  Request for initial evaluation and treatment (one time)   Delaying care until 10/22/2024, was unable to reach patient for visit today.    Physical Therapy  Request for delay in care, service is not able to be provided within same scheduled day.   Delaying care until 10/22/2024, was unable to reach patient for visit today.    Occupational Therapy  Request for delay in care, service is not able to be provided within same scheduled day.   Delaying care until 10/22/2024, was unable to reach patient for visit today.    HHA (Home Health Aide)  Request for delay in care, service is not able to be provided within same scheduled day.   Delaying care until 10/22/2024, was unable to reach patient for visit today.    They would also like to ask Dr. Nicolas to confirm: Dr. Nicolas will follow for Home Care orders?    Orders needed ASAP  Information was gathered and will be sent to provider for review.  RN will contact Home Care with information after provider review.  Confirmed ok to leave a detailed message with call back.  Contact information confirmed and updated as needed.    Cari Garza RN

## 2024-10-22 ENCOUNTER — MEDICAL CORRESPONDENCE (OUTPATIENT)
Dept: HEALTH INFORMATION MANAGEMENT | Facility: CLINIC | Age: 34
End: 2024-10-22

## 2024-10-22 ENCOUNTER — TELEPHONE (OUTPATIENT)
Dept: FAMILY MEDICINE | Facility: CLINIC | Age: 34
End: 2024-10-22
Payer: COMMERCIAL

## 2024-10-22 NOTE — TELEPHONE ENCOUNTER
Home Care is calling regarding an established patient with [facility].       Requesting orders from: Lary Nicolas  Provider is following patient: No       Orders Requested    Skilled Nursing  Frequency: 1 time a week for 3 weeks, then once every other week for 2 weeks.3 PRN vists    Social Work to Evaluate and Treat  Request for initial evaluation and treatment (one time) (first set of orders)       Orders needed by ASAP  Information was gathered and will be sent to provider for review.  RN will contact Home Care with information after provider review.  Confirmed ok to leave a detailed message with call back.  Contact information confirmed and updated as needed.    Cari Garza RN

## 2024-10-23 NOTE — TELEPHONE ENCOUNTER
Writer attempted to call Ashia Home Care Nurse with [facility] back. No answer, left detailed VM with call back number.    If Ashia calls back, please relay Dr. Bales's message to Ashia. Thanks!    JUN AhnN, RN, PHN   [facility]

## 2024-10-23 NOTE — TELEPHONE ENCOUNTER
Writer attempted to call Ashia, with [facility] regarding message below. No answer. Left detailed VM with call back number.    If Ashia call back, please relay Dr. Bales's message to her. Thanks!    JUN AhnN, RN, PHN   [facility]

## 2024-10-24 ENCOUNTER — TELEPHONE (OUTPATIENT)
Dept: FAMILY MEDICINE | Facility: CLINIC | Age: 34
End: 2024-10-24
Payer: COMMERCIAL

## 2024-10-24 NOTE — TELEPHONE ENCOUNTER
Verbal Orders Requested for Home Care     OT  2 times a week for 3 weeks   1 time a week for 2 weeks    Home care requests a call back before the end of the week.     Sejal Connelly RN  [facility]

## 2024-10-25 ENCOUNTER — TELEPHONE (OUTPATIENT)
Dept: FAMILY MEDICINE | Facility: CLINIC | Age: 34
End: 2024-10-25
Payer: COMMERCIAL

## 2024-10-25 NOTE — TELEPHONE ENCOUNTER
Home Care is calling regarding an established patient with [facility].    Requesting orders from: Lary Nicolas  Provider is following patient: Yes  Is this a 60-day recertification request?  No    Orders Requested    Speech Therapy  Request for delay in care, service is not able to be provided within same scheduled day    Patient delayed speech appointment until next week    Information was gathered and will be sent to provider for review.  RN will contact Home Care with information after provider review.  Confirmed ok to leave a detailed message with call back.  Contact information confirmed and updated as needed.    Message sent to PCP, Dr Nicolas for review of delay of speech until next week    Francisca Baca RN  [facility]

## 2024-10-25 NOTE — TELEPHONE ENCOUNTER
Relayed home care orders approval via confidential voicemail.    JUN DolanN, RN (she/her)  [facility] RN

## 2024-10-25 NOTE — TELEPHONE ENCOUNTER
[facility] [place]-  You had referred patient to them    Reason for call:  They are unable to contact patient. His phone number is not taking calls at this time. They will not be calling a 3rd time    Information relayed to patient:  na    Patient has additional questions:  No

## 2024-10-29 ENCOUNTER — TELEPHONE (OUTPATIENT)
Dept: FAMILY MEDICINE | Facility: CLINIC | Age: 34
End: 2024-10-29
Payer: COMMERCIAL

## 2024-10-29 DIAGNOSIS — G95.0 SYRINGOMYELIA (H): ICD-10-CM

## 2024-10-29 DIAGNOSIS — Q07.01 ARNOLD-CHIARI MALFORMATION, TYPE II (H): ICD-10-CM

## 2024-10-29 DIAGNOSIS — R29.898 WEAKNESS OF BOTH LOWER EXTREMITIES: ICD-10-CM

## 2024-10-29 DIAGNOSIS — R51.9 RECURRENT OCCIPITAL HEADACHE: ICD-10-CM

## 2024-10-29 DIAGNOSIS — R25.2 SPASTICITY: Primary | ICD-10-CM

## 2024-10-29 DIAGNOSIS — M79.2 NEUROPATHIC PAIN: ICD-10-CM

## 2024-10-29 NOTE — TELEPHONE ENCOUNTER
Home Care is calling regarding an established patient with [facility].  {  Requesting orders from: Lary Nicolas  Provider is following patient: Yes  Is this a 60-day recertification request?  No    Orders Requested    Physical Therapy  Request for evaluation and treatment this week due to patient being hospitalized last week.     [facility] [phone]   Confirmed ok to leave a detailed message with call back.  Contact information confirmed and updated as needed.    Routed to PCP for review.    Varun MURILLO RN  [facility]

## 2024-10-29 NOTE — TELEPHONE ENCOUNTER
Pacheco was seen today for pt order.    Diagnoses and all orders for this visit:    Spasticity  -     Physical Therapy  Referral; Future    Arnold-Chiari malformation, type II (H)  -     Physical Therapy  Referral; Future    Syringomyelia (H)  -     Physical Therapy  Referral; Future    Recurrent occipital headache  -     Physical Therapy  Referral; Future    Neuropathic pain  -     Physical Therapy  Referral; Future    Weakness of both lower extremities  -     Physical Therapy  Referral; Future

## 2024-10-29 NOTE — TELEPHONE ENCOUNTER
Request for Home Care Verbal Orders    Social Work  2 additional social work visits to help with community resources, HCD, and future planning.     Sejal Connelly RN  [facility]

## 2024-10-30 ENCOUNTER — PATIENT OUTREACH (OUTPATIENT)
Dept: CARE COORDINATION | Facility: CLINIC | Age: 34
End: 2024-10-30
Payer: COMMERCIAL

## 2024-10-30 ENCOUNTER — TELEPHONE (OUTPATIENT)
Dept: FAMILY MEDICINE | Facility: CLINIC | Age: 34
End: 2024-10-30
Payer: COMMERCIAL

## 2024-10-30 DIAGNOSIS — R09.A2 GLOBUS SENSATION: ICD-10-CM

## 2024-10-30 DIAGNOSIS — R13.10: ICD-10-CM

## 2024-10-30 DIAGNOSIS — R13.10 DYSPHAGIA, UNSPECIFIED TYPE: Primary | ICD-10-CM

## 2024-10-30 NOTE — TELEPHONE ENCOUNTER
Dr. Maria Isabel Tatum, SLP from [facility] calling to recommend patient have:  Video swallow study, associated dx dysphagia.  Esophogram to evaluate new signs/symptoms of reflux, globus sensation, feeling of burning when eating.  For insurance purposes, these need to be scheduled after discharge from home care--planned for 11/22/24.    Clinic RN - Orders should be sent to [facility] as they are following patient. Fax: [phone]      Deepti Abernathy RN  [facility]

## 2024-10-30 NOTE — PROGRESS NOTES
Care Coordination Clinician Chart Review    Situation: Patient chart reviewed by Care Coordinator.       Background: Care Coordination Program started: 6/14/2024. Initial assessment completed and patient-centered care plan(s) were developed with participation from patient. Lead CC handed patient off to CHW for continued outreaches.       Assessment: Per chart review, patient outreach completed by CC CHW on 10/30/2024.  Patient is actively working to accomplish goal(s). Patient's goal(s) appropriate and relevant at this time. Patient is not due for updated Plan of Care.  Assessments will be completed annually or as needed/with change of patient status.      Care Plan: Transportation       Problem: Lack of transportation       Goal: Establish reliable transportation       Start Date: 6/18/2024    This Visit's Progress: 60% Recent Progress: 60%    Note:     Barriers: N/A  Strengths: Family support, waiver services  Patient expressed understanding of goal: Yes    Action steps to achieve this goal:  1. I will wait for the metro mobility to be mailed to my home and have my family assist me with completing the document if needed. (Resent week of 7/23/2024)- completed  2. I will reschedule my upcoming appts that I am unable to currently make due to transportation concerns and connect with my family for their support for appointments that they can help me get to. (Updated: 09/26/2024)- continues  3. I will connect with CCC team at least once a month to discuss goal progression and address any other needs/concerns that may arise. (Updated: 09/24/2024)- continues  4. I will update CHW once received the metro mobility application and MTM transportation resources (including steps by steps) from [facility] team via mail. (Completed: received application from CHW per pt on 8/09/2024)  5. I will wait to hear from [facility] team regards to transportation service info. (Completed: 7/23/2024)  6. I will fill out my part (patient section) of the  "[employer] Mobility Application and drop off the application to my PCP in the [facility] week of 8/12/2024 for Dr. Nicolas (PCP) to fill out the physician section. (Completed)  7. I will wait to received a copy of the metro mobility form send to me via mail. (Completed- received per pt 9/26/2024)  8. I will wait to hear from [employer] for the next step. (Updated: 9/26/2024)- continues    (Updated: 10/30/2024- defer to next [facility] outreach follow up due to the pt time per pt request)                            Care Plan: General- Seek medication review/education       Problem: HP GENERAL PROBLEM       Goal: General Goal - I would like to seek medication review/education with care teams by the next 1-2 week.       Start Date: 9/26/2024    This Visit's Progress: 60% Recent Progress: 60%    Note:     Measure of Success: NA  Barriers: Take med wrong.   Strengths: Seek medication education/review with CCC RN 9/26/2024 and seek further advised.   Patient expressed understanding of goal: yes    Action steps to achieve this goal:  1. I will attend appt on 9/26/2024 at 3:00PM with CCC RN via phone visit to seek med review/education and further advise. (Completed; per pt on 9/30/2024).  2. I will attend appt on 10/01/2024 at 9:00AM with [facility]  via phone visit per [facility] RN suggested to support with medicaid. (Updated: 9/30/2024)    (Updated: 10/30/2024- defer to next [facility] outreach follow up due to the pt time per pt request)                              Care Plan: General       Problem: HP GENERAL PROBLEM       Goal: General Goal - I would like to consulted with someone regarding to health care renewal issues and seek further advised before end of December 2024.       Start Date: 9/30/2024    This Visit's Progress: 10% Recent Progress: 10%    Note:     Measure of Success: NA  Barriers: Patient shared; Possible renewal issues; not eligible for \"medicaid\" per outgoing called conversation with " Medicaid staff on 9/27/2024.   Strengths: Work with CCC SW/CHW team and FRW dept for further support.   Patient expressed understanding of goal: yes    Action steps to achieve this goal:  1. I will attend appt on 10/01/2024 at 9:00AM with [facility]  via phone visit per [facility] RN suggested to support with medicaid. (Updated: 9/30/2024)  2. I will wait to hear from [facility] FRW dept regard to the FRW referral placed on 9/302/2024 by CHW at my request. (Updated: 9/30/2024)  3. I will update status with CHW at the next outreach follow up.    (Updated: 10/30/2024- defer to next CCC outreach follow up due to the pt time per pt request)                                   Plan/Recommendations: The patient will continue working with Care Coordination to achieve goal(s) as above. CHW will continue outreaches at minimum every 30 days and will involve Lead CC as needed or if patient is ready to move to Maintenance. Lead CC will continue to monitor CHW outreaches and patient's progress to goal(s) every 6 weeks.     Plan of Care updated and sent to patient: No

## 2024-10-30 NOTE — PROGRESS NOTES
Clinic Care Coordination Contact:  Community Health Worker Follow Up    Reason: [facility] CHW Follow Up Call (follow up current goal's)    Care Gaps:   Health Maintenance Due   Topic Date Due    MEDICARE ANNUAL WELLNESS VISIT  07/17/2021    URINE DRUG SCREEN  04/07/2024    INFLUENZA VACCINE (1) 09/01/2024    COVID-19 Vaccine (6 - 2024-25 season) 09/01/2024    A1C  09/14/2024     Defer to next Care Coordination team outreach follow up with the patient due to the patient time.    Care Plan:   Care Plan: Transportation       Problem: Lack of transportation       Goal: Establish reliable transportation       Start Date: 6/18/2024    This Visit's Progress: 60% Recent Progress: 60%    Note:     Barriers: N/A  Strengths: Family support, waiver services  Patient expressed understanding of goal: Yes    Action steps to achieve this goal:  1. I will wait for the metro mobility to be mailed to my home and have my family assist me with completing the document if needed. (Resent week of 7/23/2024)- completed  2. I will reschedule my upcoming appts that I am unable to currently make due to transportation concerns and connect with my family for their support for appointments that they can help me get to. (Updated: 09/26/2024)- continues  3. I will connect with CCC team at least once a month to discuss goal progression and address any other needs/concerns that may arise. (Updated: 09/24/2024)- continues  4. I will update CHW once received the metro mobility application and MTM transportation resources (including steps by steps) from [facility] team via mail. (Completed: received application from CHW per pt on 8/09/2024)  5. I will wait to hear from [facility] team regards to transportation service info. (Completed: 7/23/2024)  6. I will fill out my part (patient section) of the [employer] Mobility Application and drop off the application to my PCP in the [employer] week of 8/12/2024 for Dr. Nicolas (PCP) to fill out the physician  "section. (Completed)  7. I will wait to received a copy of the metro mobility form send to me via mail. (Completed- received per pt 9/26/2024)  8. I will wait to hear from [employer] [facility] for the next step. (Updated: 9/26/2024)- continues    (Updated: 10/30/2024- defer to next [facility] outreach follow up due to the pt time per pt request)                            Care Plan: General- Seek medication review/education       Problem: HP GENERAL PROBLEM       Goal: General Goal - I would like to seek medication review/education with care teams by the next 1-2 week.       Start Date: 9/26/2024    This Visit's Progress: 60% Recent Progress: 60%    Note:     Measure of Success: NA  Barriers: Take med wrong.   Strengths: Seek medication education/review with [facility] RN 9/26/2024 and seek further advised.   Patient expressed understanding of goal: yes    Action steps to achieve this goal:  1. I will attend appt on 9/26/2024 at 3:00PM with CCC RN via phone visit to seek med review/education and further advise. (Completed; per pt on 9/30/2024).  2. I will attend appt on 10/01/2024 at 9:00AM with [facility]  via phone visit per [facility] RN suggested to support with medicaid. (Updated: 9/30/2024)    (Updated: 10/30/2024- defer to next [facility] outreach follow up due to the pt time per pt request)                              Care Plan: General       Problem: HP GENERAL PROBLEM       Goal: General Goal - I would like to consulted with someone regarding to health care renewal issues and seek further advised before end of December 2024.       Start Date: 9/30/2024    This Visit's Progress: 10% Recent Progress: 10%    Note:     Measure of Success: NA  Barriers: Patient shared; Possible renewal issues; not eligible for \"medicaid\" per outgoing called conversation with Medicaid staff on 9/27/2024.   Strengths: Work with CCC SW/CHW team and FRW dept for further support.   Patient expressed understanding of goal: yes    Action steps to achieve " "this goal:  1. I will attend appt on 10/01/2024 at 9:00AM with [facility]  via phone visit per [facility] RN suggested to support with medicaid. (Updated: 9/30/2024)  2. I will wait to hear from [facility] FRW dept regard to the FRW referral placed on 9/302/2024 by CHW at my request. (Updated: 9/30/2024)  3. I will update status with CHW at the next outreach follow up.    (Updated: 10/30/2024- defer to next [facility] outreach follow up due to the pt time per pt request)                              Patient Outreach Discussion:   [facility] CHW was able to connect with patient today regard to reason(s) above. Check in how patient is doing and any questions or concerns at this time. Patient shared that he is currently in a appt visit with a \"nurse\" and request CHW to call back at a different day or tomorrow. CHW confirmed.     CHW Next Follow-up: Goal(s) follow up. Informed patient of due care gaps (if any).     Outreach frequency: monthly      CHW Plan:   -Next CHW outreach plan: 1-2 business days to monitor the progression of their goal(s).              "

## 2024-10-31 NOTE — TELEPHONE ENCOUNTER
Relayed home care orders approval via confidential voicemail.    JUN DolanN, RN (she/her)  [facility] RN

## 2024-10-31 NOTE — TELEPHONE ENCOUNTER
Orders faxed to  [facility] as request.    Deepti Abernathy RN  [facility]

## 2024-10-31 NOTE — TELEPHONE ENCOUNTER
Relayed OK for orders to [facility].    Routed initial TE note (from 10/30) back to Dr. Maria Isabel MOORE with Home Care requested orders for video swallow study, esophogram orders to be scheduled post home care discharge.    Deepti Abernathy RN  [facility]

## 2024-10-31 NOTE — TELEPHONE ENCOUNTER
The service request has not called into [employer] or uncertain if provider's verbal authorization dated 10/29/24 includes the verbal order ok for PT as well.    Please advice.         Home Care is calling regarding an established patient with [facility].  {  Requesting orders from: Lary Nicolas  Provider is following patient: Yes  Is this a 60-day recertification request?  No     Orders Requested     Physical Therapy  Request for evaluation and treatment this week due to patient being hospitalized last week.      [facility] [phone]   Confirmed ok to leave a detailed message with call back.  Contact information confirmed and updated as needed.             Gino Tipton, RN  [facility]

## 2024-10-31 NOTE — TELEPHONE ENCOUNTER
Pacheco was seen today for orders.    Diagnoses and all orders for this visit:    Dysphagia, unspecified type  -     XR Video Swallow with SLP or OT - Order with Speech Therapy Referral; Future  -     Speech Therapy  Referral; Future    Globus sensation  -     XR Esophagram; Future    Burning feeling when swallowing  -     XR Esophagram; Future

## 2024-10-31 NOTE — TELEPHONE ENCOUNTER
Left detailed message on a secure identifiable VM with home care.     Sejal Connelly RN  [facility]

## 2024-11-01 ENCOUNTER — TELEPHONE (OUTPATIENT)
Dept: FAMILY MEDICINE | Facility: CLINIC | Age: 34
End: 2024-11-01
Payer: COMMERCIAL

## 2024-11-01 ENCOUNTER — PATIENT OUTREACH (OUTPATIENT)
Dept: CARE COORDINATION | Facility: CLINIC | Age: 34
End: 2024-11-01
Payer: COMMERCIAL

## 2024-11-01 NOTE — PROGRESS NOTES
Clinic Care Coordination Contact:  Community Health Worker Follow Up    Reason: CCC CHW Follow Up Call (follow up current goal's)     Care Gaps:   Health Maintenance Due   Topic Date Due    MEDICARE ANNUAL WELLNESS VISIT  07/17/2021    URINE DRUG SCREEN  04/07/2024    INFLUENZA VACCINE (1) 09/01/2024    COVID-19 Vaccine (6 - 2024-25 season) 09/01/2024    A1C  09/14/2024     Patient is aware of due care gaps and agreed to discuss/review completion with PCP/Dr. Nicolas at upcoming appt on 11/05/2024 per pt. CHW noted pt agreed to the pt appt notes (fyi: appt desk).    Care Plan:   Care Plan: Transportation- ([employer] Mobility Application) Completed 11/1/2024      Problem: Lack of transportation  Resolved 11/1/2024      Goal: Establish reliable transportation  Completed 11/1/2024      Start Date: 6/18/2024    This Visit's Progress: 100% Recent Progress: 60%    Note:     Barriers: N/A  Strengths: Family support, waiver services  Patient expressed understanding of goal: Yes    Action steps to achieve this goal:  1. I will wait for the [employer] mobility to be mailed to my home and have my family assist me with completing the document if needed. (Resent week of 7/23/2024)- completed  2. I will reschedule my upcoming appts that I am unable to currently make due to transportation concerns and connect with my family for their support for appointments that they can help me get to. (Completed: 11/01/2024)  3. I will connect with CCC team at least once a month to discuss goal progression and address any other needs/concerns that may arise. (Completed: 11/01/2024)  4. I will update CHW once received the [employer] mobility application and MTM transportation resources (including steps by steps) from [facility] team via mail. (Completed: received application from CHW per pt on 8/09/2024)  5. I will wait to hear from [facility] team regards to transportation service info. (Completed: 11/01/2024)  6. I will fill out my part (patient section) of the [employer]  "Mobility Application and drop off the application to my PCP in the [facility] week of 8/12/2024 for Dr. Nicolas (PCP) to fill out the physician section. (Completed)  7. I will wait to received a copy of the metro mobility form send to me via mail. (Completed- received per pt 9/26/2024)  8. I will wait to hear from [facility] Office for the next step. (Completed: 11/01/2024)    Personal Action Steps:   I will wait to received approval letter and information (re-send) from [facility] to me via mail. I am informed of approval and educated how to request/set up medical ride to future appt. Met goal.   I will connect with [facility] at phone number \"[phone] in 1-4 business days in advance to request ride.\" Aware of fare fee per ride. No other questions.   I will coordinate with family member(s)/mom, dad, and brother with any assistance and attend future medical appt. No other questions at this time.       Coordinate Care (Date: 11/01/2024; CHW):   Reason: follow up patient [employer] Application status  Agency: \"CONTACT:  [facility]  Monday-Friday, 7:30 AM to 4 PM  Phone: [phone]  TTY: [phone]  Email: [email or web address]\"  Note/comment: Patient is approved for transportation service with [facility] with a \"fare\" fee amount of $3.50 per one way per ride during non-rush hour, and $4.50 during rush-hour per coordinate care to [facility] today, 11/01/2024.                            Care Plan: General- Seek medication review/education       Problem: HP GENERAL PROBLEM       Goal: General Goal - I would like to seek medication review/education with care teams by the next 1-2 week.       Start Date: 9/26/2024    This Visit's Progress: 80% Recent Progress: 60%    Note:     Measure of Success: NA  Barriers: Take med wrong.   Strengths: Seek medication education/review with CCC RN 9/26/2024 and seek further " "advised.   Patient expressed understanding of goal: yes    Action steps to achieve this goal:  1. I will attend appt on 9/26/2024 at 3:00PM with CCC RN via phone visit to seek med review/education and further advise. (Completed; per pt on 9/30/2024).  2. I will attend appt on 10/01/2024 at 9:00AM with [facility]  via phone visit per [facility] RN suggested to support with medicaid. (Completed)  3. I will attend appt on 11/05/2024 with Dr. Nicolas (PCP) to review ibuprofen and gabapentin med concerns and further advise. (Updated: 11/01/2024)  4. I will call PCP office and the neurologist office with any other questions. (Updated: 11/01/2024)                              Care Plan: General       Problem: HP GENERAL PROBLEM       Goal: General Goal - I would like to consulted with someone regarding to health care renewal issues and seek further advised before end of December 2024.       Start Date: 9/30/2024    This Visit's Progress: 50% Recent Progress: 10%    Note:     Measure of Success: NA  Barriers: Patient shared; Possible renewal issues; not eligible for \"medicaid\" per outgoing called conversation with Medicaid staff on 9/27/2024.   Strengths: Work with CCC SW/CHW team and FRW dept for further support.   Patient expressed understanding of goal: yes    Action steps to achieve this goal:  1. I will attend appt on 10/01/2024 at 9:00AM with [facility]  via phone visit per [facility] RN suggested to support with medicaid. (Completed)  2. I will wait to hear from [facility] FRW dept regard to the FRW referral placed on 9/302/2024 by CHW at my request. (Completed)  3. I will update status with CHW at the next outreach follow up. (Updated: 11/01/2024)  4. I will continue to work with FRW dept toward goal completion. (Updated: 11/01/2024)                                  Patient Outreach Discussion:   [facility] CHW was able to connect with patient today regard to reason above. Check in how patient is doing and any questions or concerns at " "this time. Pt concerns of transportation service. CHW verified pca service.     Patient request help with connect with [facility] [employer] follow up his application status, enrollment status with [employer] program, and shared medication concerns below. Pt confirmed spoke with [employer] on 10/01/2024 per pt chart reviewed. [employer] contact phone number \"[phone]\" given to pt per pt request. Message routed to [employer] per pt request N to connect with pt for update on [employer]IGH and enrollment status. NOTE/FYI: CHW noted clinical team and or  team to offer pt food bag/box and supplies available within [employer] to pt on 2024.     Patient Shared:  -PCA service update: Active with \"12 hours everyday; 2 pca persons- my mom and dad; service from 7:00AM to 8:00PM.\" They are my pca's and can't bring me to appt.   -Transportation service to appt: My family \"dad and brother\" been taking me to medical appt at this time.   -Have recently surgery done on 2024 with [facility]. Getting better.   -Medication concerns: the way I taking my med ibuprofen and gabapentin is incorrect and are causing pain. Wonder if my doctors can decrease dosage amount? Dr. Nicolas prescribed ibuprofen and Dr. Brown, my neurologist prescribed gabapentin.    -Questions: LEIGH signed and sent it back to [facility] beginning of 2024 with my sister helped. When I will going to start to receiving food package delivery? Request follow up status with [employer].     Coordinated Care:   Reason: follow up patient [employer] Application status  Agency: \"[facility]  [facility] [place]  Monday-Friday, 7:30 AM to 4 PM  Phone: [phone]  TTY: [phone]  Email: [email or web address].\"    Patient gave verbal permission to leave a details message including pt full name,  and phone number with [employer] if no answer per CHW verified prior [facility]    CHW with pt conference call to [facility]" "Mobility Center at phone number above and spoke with representative name pronouncedCriselda. CHW explained reason of call, introduced self, and that pt is in the call with CHW. Pt provides his identities info and confirmed home address. CHW request another approval info to send to patient for patient records. End call with rep. Completed transportation goal due to pt met goal. Updated current goals. NOTE: Pt approval info added to the \"My Sticky Note\" in pt chart.     Criselda shared:  -On 09/24/2024 we sent out approval letter and information to you (fyi: patient) via mail. You have been approved (since September 2024) through 2028 (fyi per CHW verified). You will have to pay Fare fee of $3.50 per one way per ride during non-rush hour, and $4.50 during rush-hour. You needs to contact [phone] in 1-4 business days in advance to request ride.  -Request same approval information to re-send to you via mail.     CHW suggested patient for the following:  -Pt connect CCC/CHW with any additional resources need and PCP office for scheduling appt.   -Patient work with PCP and neurologist office regarding to medication concerns listed above and follow instructions. Pt attend appt on 11/15/2024 with Dr. Nicolas for further advise if needs.   -Patient check mail for approval letter and information from [facility], review and follow instructions.   -Patient work with both PCA persons (mom and dad) within the 12 hours of pca service time frame to assist with set up transportation service with [facility], and schedule appt to care teams (physicians) clinic, and may help brining pt to attend appt (as parents/family member). NOTE/COMMENT: Pt is educated the differences between family member assist/support/help by volunteering, and not as PCA role.     Appt reminder:   Patient is reminded to appt date below with PCP in [facility] per appt desk reviewed. Pt confirmed.   Name: Pacheco David MRN: [id number]     Date: " 11/5/2024 Status: Scheduled     Time: 11:20 AM Length: 20     Visit Type: OFFICE VISIT [2421] Copay: $0.00     Provider: Lary Nicolas MD Department: [facility]       Notes: follow up; review pain med (ibuprofen & gabapentin) per pt on 11/01 & discuss due care gaps     CHW Next Follow-up: Goal(s) follow up. Informed patient of due care gaps (if any).     Outreach frequency: monthly      CHW Plan:   -Patient wait to hear from [employer] with [employer] program for further advised in regards to patient questions above.  -Pt attend appt on 11/05/2024 at 11:20AM with Dr. Nicolas in [facility].   -Message routed to [employer] for further advise regarding to pt questions above per pt request.   -Next CHW outreach plan: 1 month to monitor the progression of their goal(s).

## 2024-11-01 NOTE — TELEPHONE ENCOUNTER
Grover from [facility] calling    Reason for call:  Just to let you know that they have been trying to contact patient. Meals are ready . They just waiting  on the client. They have also emailed him.     Information relayed to patient:  see above    Patient has additional questions:  No        Just an FYI. If we hear from him  please let him know and give him [facility] phone # [phone]    I will share this with Care coordination also

## 2024-11-04 ENCOUNTER — PATIENT OUTREACH (OUTPATIENT)
Dept: CARE COORDINATION | Facility: CLINIC | Age: 34
End: 2024-11-04

## 2024-11-04 ENCOUNTER — TELEPHONE (OUTPATIENT)
Dept: FAMILY MEDICINE | Facility: CLINIC | Age: 34
End: 2024-11-04

## 2024-11-04 NOTE — PROGRESS NOTES
Food Resource Navigator Contact    [facility]N - Follow Up    Reason for call: Type 2 Diabetes  Other: food insecurity     Intervention and Education during outreach: Pacheco let [facility] know that he has medically tailored meals set up for delivery in about 2 weeks. In the meantime, there is food scarcity in the home. He says that parents have a car and drive to get groceries but finances are a barrier.     Food Resource Navigator Plan: YENIFER sent and email to Pacheco with three separate food shelf options that could be used as early as today but all three available this week without appointment. Requested that Pacheco discuss with his parents and use as needed to ensure food in the home. He said that he could do that.     I have discussed the following goals with the patient: Pacheco to discuss local food shelf information with parents so that there is food in the home while they wait for medically tailored meals with [facility] to be delivered. Parents also enrolled in meal delivery as household members.     Spent 9 minutes in consult with the patient.     Unique Holland   [facility] Food Resource Navigator  [employer] [facility]   [phone]

## 2024-11-04 NOTE — TELEPHONE ENCOUNTER
Received call from Wilfredo at [employer] stating they were able to reach pt and have schedule meal delivery on 11/15.     Colleague Unique [address]RE[address] notes, states she was able to talk to pt on today's encounter. Did not outreach further to pt.       Ja David  [facility] Food Resource Navigator  [employer] [facility]   Cell: [phone]

## 2024-11-04 NOTE — TELEPHONE ENCOUNTER
Forms/Letter Request    Type of form/letter: Home Health Certification      Do we have the form/letter: Yes: placed in PCP inbox    Who is the form from? Home care    Where did/will the form come from? form was faxed in    When is form/letter needed by: asap    How would you like the form/letter returned: Fax : [phone]

## 2024-11-07 ENCOUNTER — PATIENT OUTREACH (OUTPATIENT)
Dept: CARE COORDINATION | Facility: CLINIC | Age: 34
End: 2024-11-07
Payer: COMMERCIAL

## 2024-11-07 ENCOUNTER — MEDICAL CORRESPONDENCE (OUTPATIENT)
Dept: HEALTH INFORMATION MANAGEMENT | Facility: CLINIC | Age: 34
End: 2024-11-07
Payer: COMMERCIAL

## 2024-11-07 DIAGNOSIS — Z53.9 DIAGNOSIS NOT YET DEFINED: Primary | ICD-10-CM

## 2024-11-07 PROCEDURE — G0180 MD CERTIFICATION HHA PATIENT: HCPCS | Performed by: FAMILY MEDICINE

## 2024-11-08 ENCOUNTER — NURSE TRIAGE (OUTPATIENT)
Dept: FAMILY MEDICINE | Facility: CLINIC | Age: 34
End: 2024-11-08
Payer: COMMERCIAL

## 2024-11-08 NOTE — TELEPHONE ENCOUNTER
"SEE IN OFFICE TODAY:   * You need to be examined today. Let me give you an appointment.  * No appointments available in clinic today  * IF NO AVAILABLE APPOINTMENTS:  You need to be seen in the Urgent Care Center. Go to the one at [facility]. Go there today. A nearby Urgent Care Center is often a good source of care. Another choice is to go to the Emergency Department.    Usually has a M every other day  Yes, frequently constipated  No pain  Usually hard stools and brown in color.   No blood  Abdomin is bloated and sometimes burp    Francisca Baca RN  [facility]    Reason for Disposition   Abdomen is more swollen than usual    Additional Information   Negative: Abdomen pain is main symptom and male   Negative: Abdomen pain is main symptom and female   Negative: Rectal bleeding or blood in stool is main symptom   Negative: Vomiting bile (green color)   Negative: Patient sounds very sick or weak to the triager   Negative: Constant abdominal pain lasting > 2 hours   Negative: Vomiting and abdomen looks much more swollen than usual   Negative: Rectal pain or fullness from fecal impaction (rectum full of stool) and NOT better after SITZ bath, suppository or enema    Answer Assessment - Initial Assessment Questions  1. STOOL PATTERN OR FREQUENCY: \"How often do you have a bowel movement (BM)?\"  (Normal range: 3 times a day to every 3 days)  \"When was your last BM?\"      Usually has a M every other day  Yes, frequently constipated  No pain  Usually hard stools and brown in color.   No blood  Abdomin is bloated and sometimes burp    2. STRAINING: \"Do you have to strain to have a BM?\"       Yes, frequently constipated    3. RECTAL PAIN: \"Does your rectum hurt when the stool comes out?\" If Yes, ask: \"Do you have hemorrhoids? How bad is the pain?\"  (Scale 1-10; or mild, moderate, severe)      No pain    4. STOOL COMPOSITION: \"Are the stools hard?\"       Usually hard stools and brown in " "color    5. BLOOD ON STOOLS: \"Has there been any blood on the toilet tissue or on the surface of the BM?\" If Yes, ask: \"When was the last time?\"      No blood    6. CHRONIC CONSTIPATION: \"Is this a new problem for you?\"  If No, ask: \"How long have you had this problem?\" (days, weeks, months)       2 weeks no BM    7. CHANGES IN DIET OR HYDRATION: \"Have there been any recent changes in your diet?\" \"How much fluids are you drinking on a daily basis?\"  \"How much have you had to drink today?\"      No changes    8. MEDICINES: \"Have you been taking any new medicines?\" \"Are you taking any narcotic pain medicines?\" (e.g., Dilaudid, morphine, Percocet, Vicodin)      No medication changes    9. LAXATIVES: \"Have you been using any stool softeners, laxatives, or enemas?\"  If Yes, ask \"What, how often, and when was the last time?\"      Has tried laxative and stool softeners    10. ACTIVITY:  \"How much walking do you do every day?\"  \"Has your activity level decreased in the past week?\"           Decreased activity and has decreased mobility    11. CAUSE: \"What do you think is causing the constipation?\"         Unknown,     12. OTHER SYMPTOMS: \"Do you have any other symptoms?\" (e.g., abdomen pain, bloating, fever, vomiting)          Abdomin is bloated and sometimes burp    13. MEDICAL HISTORY: \"Do you have a history of hemorrhoids, rectal fissures, or rectal surgery or rectal abscess?\"          No history    14. PREGNANCY: \"Is there any chance you are pregnant?\" \"When was your last menstrual period?\"        N/a    Protocols used: Constipation-A-OH    "

## 2024-11-11 ENCOUNTER — MYC REFILL (OUTPATIENT)
Dept: FAMILY MEDICINE | Facility: CLINIC | Age: 34
End: 2024-11-11

## 2024-11-11 ENCOUNTER — OFFICE VISIT (OUTPATIENT)
Dept: FAMILY MEDICINE | Facility: CLINIC | Age: 34
End: 2024-11-11
Payer: COMMERCIAL

## 2024-11-11 VITALS
RESPIRATION RATE: 18 BRPM | HEART RATE: 85 BPM | HEIGHT: 66 IN | DIASTOLIC BLOOD PRESSURE: 82 MMHG | WEIGHT: 218 LBS | BODY MASS INDEX: 35.03 KG/M2 | SYSTOLIC BLOOD PRESSURE: 126 MMHG | OXYGEN SATURATION: 97 % | TEMPERATURE: 97.1 F

## 2024-11-11 DIAGNOSIS — E11.9 TYPE 2 DIABETES MELLITUS WITHOUT COMPLICATION, WITH LONG-TERM CURRENT USE OF INSULIN (H): ICD-10-CM

## 2024-11-11 DIAGNOSIS — G95.0 SYRINGOMYELIA (H): ICD-10-CM

## 2024-11-11 DIAGNOSIS — R10.13 EPIGASTRIC PAIN: ICD-10-CM

## 2024-11-11 DIAGNOSIS — K59.00 CONSTIPATION, UNSPECIFIED CONSTIPATION TYPE: Primary | ICD-10-CM

## 2024-11-11 DIAGNOSIS — Z79.4 TYPE 2 DIABETES MELLITUS WITHOUT COMPLICATION, WITH LONG-TERM CURRENT USE OF INSULIN (H): ICD-10-CM

## 2024-11-11 DIAGNOSIS — M79.2 NEUROPATHIC PAIN: ICD-10-CM

## 2024-11-11 DIAGNOSIS — R25.2 SPASTICITY: ICD-10-CM

## 2024-11-11 PROBLEM — Z23 ENCOUNTER FOR IMMUNIZATION: Status: RESOLVED | Noted: 2024-07-08 | Resolved: 2024-11-11

## 2024-11-11 PROCEDURE — 99214 OFFICE O/P EST MOD 30 MIN: CPT | Mod: 25 | Performed by: PHYSICIAN ASSISTANT

## 2024-11-11 PROCEDURE — 90480 ADMN SARSCOV2 VAC 1/ONLY CMP: CPT | Performed by: PHYSICIAN ASSISTANT

## 2024-11-11 PROCEDURE — G0008 ADMIN INFLUENZA VIRUS VAC: HCPCS | Performed by: PHYSICIAN ASSISTANT

## 2024-11-11 PROCEDURE — 91320 SARSCV2 VAC 30MCG TRS-SUC IM: CPT | Performed by: PHYSICIAN ASSISTANT

## 2024-11-11 PROCEDURE — 90656 IIV3 VACC NO PRSV 0.5 ML IM: CPT | Performed by: PHYSICIAN ASSISTANT

## 2024-11-11 RX ORDER — SENNA AND DOCUSATE SODIUM 50; 8.6 MG/1; MG/1
1 TABLET, FILM COATED ORAL AT BEDTIME
Qty: 90 TABLET | Refills: 0 | Status: SHIPPED | OUTPATIENT
Start: 2024-11-11 | End: 2025-02-09

## 2024-11-11 RX ORDER — POLYETHYLENE GLYCOL 3350 17 G/17G
17 POWDER, FOR SOLUTION ORAL DAILY
Qty: 238 G | Refills: 1 | Status: SHIPPED | OUTPATIENT
Start: 2024-11-11

## 2024-11-11 RX ORDER — IBUPROFEN 800 MG/1
800 TABLET, FILM COATED ORAL EVERY 8 HOURS PRN
Qty: 100 TABLET | Refills: 0 | Status: SHIPPED | OUTPATIENT
Start: 2024-11-11

## 2024-11-11 RX ORDER — BACLOFEN 10 MG/1
10 TABLET ORAL 3 TIMES DAILY
Qty: 100 TABLET | Refills: 11 | OUTPATIENT
Start: 2024-11-11

## 2024-11-11 RX ORDER — ACETAMINOPHEN 500 MG
1000 TABLET ORAL 3 TIMES DAILY
Qty: 200 TABLET | Refills: 1 | Status: SHIPPED | OUTPATIENT
Start: 2024-11-11

## 2024-11-11 NOTE — PATIENT INSTRUCTIONS
Start Senna-docusate pill once a day to help with constipation.  I think you may have taken this sometime in the past.    Take Miralax once a day now, until this current episode of constipation has improved.  You can also take it in the future, if needed.    If you haven't had a bowel movement by Thursday, let us know.

## 2024-11-11 NOTE — PROGRESS NOTES
Subjective:    Pacheco David is a 34 year old male with a history of syringomyelia, spinal cord injury, and epilepsy, who presents with chief complaint of constipation.  He has had chronic constipation in the past.  He was taking senna-docusate pills in the past but those were discontinued.  He does not recall taking them.  He does have MiraLAX on his med list.  He notes that he was taking MiraLAX daily up until recently, and he did not think it was helping his constipation much.  His chronic neurological conditions could certainly be affecting his symptoms.  He does also note that he thinks constipation worsened when he increased his Ozempic dose.    Last normal bowel movement was about 5 days ago.  Right now no significant abdominal pain, no fevers.  Appears to be urinating normally.  Normal appetite.  Has an appointment tomorrow with his neurosurgeon.      Patient Active Problem List   Diagnosis    Allergies    Vitamin D deficiency    Hyperlipidemia    Major Depression Severe Single Episode    Arnold-Chiari malformation, type II (H)    Obstructive sleep apnea    Syringomyelia (H)    Epilepsy And Recurrent Seizures    Keloid scar of skin    Dysphagia    Recurrent occipital headache     Shunt    Neuropathic pain    Neurogenic bladder    Neurogenic bowel    Controlled substance agreement signed    Angular cheilitis    Cognitive deficits    Sleep disturbances    Hydrocephalus with operating  shunt (H)    Encounter for therapeutic drug monitoring    Diabetes mellitus, type 2 (H)    Morbid obesity (H)    Bleeds easily (H)    Vitamin B12 deficiency (non anemic)    Spasticity    Abnormal LFTs (liver function tests)    Transportation insecurity    Weakness of both lower extremities    Herpes keratitis of left eye    Cauda equina spinal cord injury, sequela       Current Outpatient Medications:     ACCU-CHEK GUIDE test strip, Use to test blood sugar once daily., Disp: 50 strip, Rfl: 6    acetaminophen (TYLENOL) 500 MG  tablet, Take 2 tablets (1,000 mg) by mouth 3 times daily. With gabapentin, Disp: 200 tablet, Rfl: 1    ARIPiprazole (ABILIFY) 2 MG tablet, TAKE 1 TABLET(2 MG) BY MOUTH DAILY, Disp: 30 tablet, Rfl: 0    baclofen (LIORESAL) 10 MG tablet, Take 1 tablet (10 mg) by mouth 3 times daily., Disp: 100 tablet, Rfl: 11    blood glucose (ACCU-CHEK SOFTCLIX) lancing device, Lancing device to be used with lancets., Disp: 1 each, Rfl: 0    blood glucose monitoring (NO BRAND SPECIFIED) meter device kit, Use to test blood sugar 1 times daily or as directed. Preferred blood glucose meter OR supplies to accompany: Blood Glucose Monitor Brands: per insurance., Disp: 1 kit, Rfl: 0    blood glucose monitoring (SOFTCLIX) lancets, Use to test blood sugar once daily., Disp: 100 each, Rfl: 4    cyanocobalamin (CYANOCOBALAMIN) 1000 mcg/mL injection, Inject 1 mL (1,000 mcg) into the muscle once a week., Disp: 8 mL, Rfl: 0    DULoxetine (CYMBALTA) 60 MG capsule, Take 1 capsule (60 mg) by mouth daily., Disp: 90 capsule, Rfl: 4    DULoxetine (CYMBALTA) 60 MG capsule, Take 60 mg by mouth, Disp: , Rfl:     fluticasone (FLONASE) 50 MCG/ACT nasal spray, Spray 2 sprays into both nostrils 2 times daily, Disp: , Rfl:     gabapentin (NEURONTIN) 600 MG tablet, Take 2 tablets (1,200 mg) by mouth every morning AND 2 tablets (1,200 mg) daily at 2 pm AND 3 tablets (1,800 mg) at bedtime. Take three tablet at bedtime.., Disp: 210 tablet, Rfl: 0    gabapentin (NEURONTIN) 600 MG tablet, Take 2 tablets by mouth 2 times daily Take two tablet in the morning and evening., Disp: , Rfl:     ibuprofen (ADVIL/MOTRIN) 800 MG tablet, Take 1 tablet (800 mg) by mouth every 8 hours as needed for moderate pain. Take with food., Disp: 100 tablet, Rfl: 0    lamoTRIgine (LAMICTAL) 100 MG tablet, Take 1 tablet (100 mg) by mouth 2 times daily., Disp: 180 tablet, Rfl: 3    lidocaine (LIDODERM) 5 % patch, PLACE ONTO THE SKIN EVERY 24 HOURS TO PREVENT LIDOCAINE TOXICITY, PATIENT SHOULD  "BE PATCH FREE FOR 12 HRS DAILY., Disp: 30 patch, Rfl: 4    loratadine (ALLERGY RELIEF) 10 MG tablet, TAKE 1 PILL BY MOUTH EVERY DAY/ NOJ IB LUB IB HNUB PAB ALLERGY, Disp: 90 tablet, Rfl: 2    mometasone (NASONEX) 50 MCG/ACT nasal spray, Spray 2 sprays in nostril daily, Disp: 17 g, Rfl: 4    omeprazole (PRILOSEC) 20 MG DR capsule, TAKE 1 PILL BY MOUTH EVERY DAY/ NOJ IB LUB IB HNUB PAB LUB PLAB, Disp: 90 capsule, Rfl: 4    polyethylene glycol (MIRALAX) 17 GM/Dose powder, Take 17 g by mouth daily. As needed for constipation, Disp: 238 g, Rfl: 1    rosuvastatin (CRESTOR) 20 MG tablet, TAKE 1 PILL BY MOUTH EVERY DAY AT BEDTIME/TXHUA O THAUM MUS PW NOJ 1 LUB. PAB ZOO NTSHAV MUAJ ROJ, Disp: 90 tablet, Rfl: 3    Semaglutide, 2 MG/DOSE, (OZEMPIC) 8 MG/3ML pen, Inject 2 mg subcutaneously every 7 days. After 4 weeks on 1 mg dose., Disp: 9 mL, Rfl: 2    SENNA-docusate sodium (SENNA S) 8.6-50 MG tablet, Take 1 tablet by mouth at bedtime. Hold if diarrhea, Disp: 90 tablet, Rfl: 0    sodium chloride 0.65 % nasal spray, Spray 1-2 sprays in nostril, Disp: , Rfl:     Syringe/Needle, Disp, 23G X 1\" 1 ML MISC, 1 each once a week., Disp: 8 each, Rfl: 0    Syringe/Needle, Disp, 25G X 1-1/2\" 1 ML MISC, 1 each once a week. Use to inject vitamin B12 1000 mcg IM., Disp: 10 each, Rfl: 4    traZODone (DESYREL) 50 MG tablet, Take 100 mg by mouth at bedtime, Disp: , Rfl:     Turmeric 5-1000 MG CAPS, Take 1 Capful by mouth 2 times daily., Disp: 180 capsule, Rfl: 0    vitamin D3 (CHOLECALCIFEROL) 50 mcg (2000 units) tablet, Take 1 tablet (50 mcg) by mouth daily., Disp: 100 tablet, Rfl: 4    Current Facility-Administered Medications:     cyanocobalamin injection 1,000 mcg, 1,000 mcg, Intramuscular, Q30 Days,     cyanocobalamin injection 1,000 mcg, 1,000 mcg, Intramuscular, Q30 Days, , 1,000 mcg at 08/09/24 1007      Objective:   Allergies:  Vancomycin and Pregabalin    Vitals:  Vitals:    11/11/24 0956   BP: 126/82   BP Location: Left arm " "  Patient Position: Sitting   Cuff Size: Adult Large   Pulse: 85   Resp: 18   Temp: 97.1  F (36.2  C)   TempSrc: Temporal   SpO2: 97%   Weight: 98.9 kg (218 lb)   Height: 1.676 m (5' 6\")       Body mass index is 35.19 kg/m .    Vital signs reviewed.  General: Patient is alert and oriented x 3, in no apparent distress  Cardiac: regular rate and rhythm, no murmurs  Pulmonary: lungs clear to auscultation bilaterally, no crackles, rales, rhonchi, or wheezing noted  Abdomen: Exam slightly limited because patient is sitting in electric wheelchair, no pain with palpation, no masses palpable, positive bowel sounds         Assessment and Plan:   1. Constipation, unspecified constipation type (Primary)  Chronic, worsening.  Could be influenced by his neurologic issues, along with increased dose of Ozempic.  He notes that he has had worsening constipation since his Ozempic dose was increased.  He does not think MiraLAX use has been very helpful in the past.  He did take senna-docusate in the past, but that was discontinued for some reason.  He does not recall taking it.  Restart senna daily.  Start with 1 pill daily, could increase to 2 pills daily if needed.  Additionally, I want him to take MiraLAX daily, at least for the next several days, with this current episode of constipation.  Can stop that in the future if symptoms are well-controlled with senna-docusate alone.  He is seeing his neurosurgeon tomorrow.  I asked that he review this plan with his neurosurgeon, to see if they have any additional/alternate recommendations.  If he does not have a bowel movement by this Thursday, he will contact us.  At that time, consider increasing senna and/or increasing MiraLAX.  No worrisome symptoms reported today, exam grossly normal.  I also discussed with him that could consider decreasing Ozempic dose in the future, if that is playing a part in his symptoms.  He will review that with his PCP.  - SENNA-docusate sodium (SENNA S) " 8.6-50 MG tablet; Take 1 tablet by mouth at bedtime. Hold if diarrhea  Dispense: 90 tablet; Refill: 0  - polyethylene glycol (MIRALAX) 17 GM/Dose powder; Take 17 g by mouth daily. As needed for constipation  Dispense: 238 g; Refill: 1     2. Syringomyelia (H)  3. Neuropathic pain  Reviewed in relation to #1.  Follows with neurology and neurosurgery.  - ibuprofen (ADVIL/MOTRIN) 800 MG tablet; Take 1 tablet (800 mg) by mouth every 8 hours as needed for moderate pain. Take with food.  Dispense: 100 tablet; Refill: 0  - acetaminophen (TYLENOL) 500 MG tablet; Take 2 tablets (1,000 mg) by mouth 3 times daily. With gabapentin  Dispense: 200 tablet; Refill: 1      This dictation uses voice recognition software, which may contain typographical errors.

## 2024-11-11 NOTE — TELEPHONE ENCOUNTER
Clinic RN: Please contact patient because for Semaglutide, 2 MG/DOSE, (OZEMPIC) 8 MG/3ML pen the last prescription was a taper dose (not in RN protocol). We need to know what dose patient is taking. Determine the current dose, then route to provider and ask provider to update the SIG and approve or deny the prescription.

## 2024-11-12 ENCOUNTER — PATIENT OUTREACH (OUTPATIENT)
Dept: CARE COORDINATION | Facility: CLINIC | Age: 34
End: 2024-11-12
Payer: COMMERCIAL

## 2024-11-12 RX ORDER — GABAPENTIN 600 MG/1
TABLET ORAL
Qty: 210 TABLET | Refills: 0 | Status: SHIPPED | OUTPATIENT
Start: 2024-11-12

## 2024-11-12 NOTE — PROGRESS NOTES
[facility] Update   11/12/2024 TRESA called patient back after receiving a call and patent stated that he did not want to apply for any county benefits at this this due to the [facility] is closing Case and program

## 2024-11-13 ENCOUNTER — PATIENT OUTREACH (OUTPATIENT)
Dept: CARE COORDINATION | Facility: CLINIC | Age: 34
End: 2024-11-13
Payer: COMMERCIAL

## 2024-11-13 NOTE — PROGRESS NOTES
Clinic Care Coordination Contact    [facility] changed lead care coordinator to CC RN to follow up as [facility] will no longer be part of the system.    DAISHA Cross   Social Work Care Coordinator   [facility]  [phone]

## 2024-11-17 ENCOUNTER — HEALTH MAINTENANCE LETTER (OUTPATIENT)
Age: 34
End: 2024-11-17

## 2024-11-18 ENCOUNTER — OFFICE VISIT (OUTPATIENT)
Dept: PODIATRY | Facility: CLINIC | Age: 34
End: 2024-11-18
Attending: FAMILY MEDICINE
Payer: COMMERCIAL

## 2024-11-18 VITALS — SYSTOLIC BLOOD PRESSURE: 116 MMHG | DIASTOLIC BLOOD PRESSURE: 78 MMHG | OXYGEN SATURATION: 98 % | HEART RATE: 123 BPM

## 2024-11-18 DIAGNOSIS — M79.671 FOOT PAIN, RIGHT: ICD-10-CM

## 2024-11-18 DIAGNOSIS — S90.31XA CONTUSION OF RIGHT FOOT, INITIAL ENCOUNTER: Primary | ICD-10-CM

## 2024-11-18 PROCEDURE — 99214 OFFICE O/P EST MOD 30 MIN: CPT | Performed by: PODIATRIST

## 2024-11-18 ASSESSMENT — PAIN SCALES - GENERAL: PAINLEVEL_OUTOF10: EXTREME PAIN (8)
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
## 2024-12-05 ENCOUNTER — PATIENT OUTREACH (OUTPATIENT)
Dept: CARE COORDINATION | Facility: CLINIC | Age: 34
End: 2024-12-05
Payer: COMMERCIAL

## 2024-12-05 DIAGNOSIS — Z71.89 OTHER SPECIFIED COUNSELING: Primary | Chronic | ICD-10-CM

## 2024-12-05 NOTE — PROGRESS NOTES
Clinic Care Coordination Contact:  Community Health Worker Follow Up    Today's date: 12/05/2024    Reason: [facility] CHW Follow Up Call (follow up current goal's)    Care Gaps:   Health Maintenance Due   Topic Date Due    MEDICARE ANNUAL WELLNESS VISIT  07/17/2021    URINE DRUG SCREEN  04/07/2024    A1C  09/14/2024     Pt is aware to discuss/review due care gaps details and completion with Melissa Nye PA-C on 12/10/2024 during the pt appt time. Pt confirmed.    Care Plan:   Care Plan: General- Seek medication review/education       Problem: HP GENERAL PROBLEM       Goal: General Goal - I would like to seek medication review/education with care teams by the next 1-2 week.       Start Date: 9/26/2024    This Visit's Progress: 80% Recent Progress: 80%    Note:     Measure of Success: NA  Barriers: Take med wrong.   Strengths: Seek medication education/review with CCC RN 9/26/2024 and seek further advised.   Patient expressed understanding of goal: yes    Action steps to achieve this goal:  1. I will attend appt on 9/26/2024 at 3:00PM with CCC RN via phone visit to seek med review/education and further advise. (Completed; per pt on 9/30/2024).  2. I will attend appt on 10/01/2024 at 9:00AM with [facility]  via phone visit per [facility] RN suggested to support with medicaid. (Completed)  3. I will attend appt on 11/05/2024 with Dr. Nicolas (PCP) to review ibuprofen and gabapentin med concerns and further advise. (Completed- provider is unavailable per pt on 12/05/2024)  4. I will call PCP office and the neurologist office with any other questions. (Updated: 11/01/2024)  5. I will attend appt on 12/10/2024 with Melissa Nye PA-C to review medication concerns. (Updated: 12/05/2024)                            Care Plan: General       Problem: HP GENERAL PROBLEM       Goal: General Goal - I would like to consulted with someone regarding to health care renewal issues and seek further advised before end of  "December 2024.       Start Date: 9/30/2024    This Visit's Progress: 50% Recent Progress: 50%    Note:     Measure of Success: NA  Barriers: Patient shared; Possible renewal issues; not eligible for \"medicaid\" per outgoing called conversation with Medicaid staff on 9/27/2024.   Strengths: Work with CCC SW/CHW team and FRW dept for further support.   Patient expressed understanding of goal: yes    Action steps to achieve this goal:  1. I will attend appt on 10/01/2024 at 9:00AM with [facility]  via phone visit per [facility] RN suggested to support with medicaid. (Completed)  2. I will wait to hear from [facility] FRW dept regard to the FRW referral placed on 9/30/2024 by CHW at my request. (Completed)  3. I will update status with CHW at the next outreach follow up. (Updated: 12/05/2024)- continues  4. I will continue to work with FRW dept toward goal completion. (Updated:  12/05/2024)- continues  5. I will wait to hear from FRW dept regarding to referral place on 12/05/2024 to discuss renewal health care insurance concerns. (Updated: 12/05/2024)      Note/comment:   -FRW and pt connected on 11/12/2024. (Date: 12/05/2024; MX)  -Renewal health care concerns continues. New FRW referral placed 12/05/2024. (Date: 12/05/2024; MX)                                Patient Outreach Discussion:   [facility] CHW was able to connect with patient today regard to reason(s) above. Check in how patient is doing and any questions or concerns at this time.     Patient still have renewal health care insurance concerns per current goal follow up. Pt is interested to discuss this with FRW. FRW referral placed today per pt agreed. CHW explained Financial Resource Worker (FRW) referral and suggested pt to seek renewal health care concerns with FRW dept. Pt confirmed.     Referral placed today:   Financial Resource Worker (FRW) referral.   Reason: help with renewal health care insurance.    CHW suggested patient for the following:  -Pt connect CCC/CHW " with any additional resources need and PCP office for scheduling appt.     Appt reminder:   Patient is reminded to appt date(s) below. Pt confirmed. Suggested pt to connect with [facility] and coordinate with family member regarding to transportation to the appt. Pt shared no transportation concerns at this time.   Name: Pacheco David MRN: [id number]     Date: 12/9/2024 Status: Scheduled     Time: 4:45 PM Length: 45     Visit Type: MR FOOT RIGHT WO [[id number]] Copay: $0.00     Provider: [facility] Department: [facility]       Notes: * EPIC ORDER  SB - PATIENT  CLEARED BY [facility] -  PT WILL MAKE FOLLOW UP APPT WITH NEUROLOGY.     Name: Pacheco David MRN: [id number]     Date: 12/10/2024 Status: Scheduled     Time: 12:15 PM Length: 30     Visit Type: MYC OFFICE VISIT [6539] Copay: $0.00     Provider: Melissa Nye PA-C [facility]       Notes: Ozempic refill would like a higher dose because current dose isn't lasting long enough, (& discuss/review due care gaps per pt agreed 12/05/2024)     CHW Next Follow-up: Goal(s) follow up. Informed patient of due care gaps (if any).     Outreach frequency: monthly      CHW Plan:   -Patient attend the following appt schedule above as schedule.   -Pt wait to hear from the FRW dept/team to further assist with health care renewal concerns.   -Next CHW outreach plan: 1 month to monitor the progression of their goal(s).

## 2024-12-10 ENCOUNTER — VIRTUAL VISIT (OUTPATIENT)
Dept: FAMILY MEDICINE | Facility: CLINIC | Age: 34
End: 2024-12-10
Payer: COMMERCIAL

## 2024-12-10 DIAGNOSIS — M79.2 NEUROPATHIC PAIN: ICD-10-CM

## 2024-12-10 DIAGNOSIS — E16.2 HYPOGLYCEMIA: ICD-10-CM

## 2024-12-10 DIAGNOSIS — R10.13 EPIGASTRIC PAIN: ICD-10-CM

## 2024-12-10 DIAGNOSIS — E66.01 SEVERE OBESITY (BMI 35.0-35.9 WITH COMORBIDITY) (H): Primary | ICD-10-CM

## 2024-12-10 DIAGNOSIS — J31.0 CHRONIC RHINITIS: ICD-10-CM

## 2024-12-10 PROCEDURE — G2211 COMPLEX E/M VISIT ADD ON: HCPCS | Mod: 95 | Performed by: PHYSICIAN ASSISTANT

## 2024-12-10 PROCEDURE — 99214 OFFICE O/P EST MOD 30 MIN: CPT | Mod: 95 | Performed by: PHYSICIAN ASSISTANT

## 2024-12-10 RX ORDER — ACETAMINOPHEN 500 MG
1000 TABLET ORAL 3 TIMES DAILY
Qty: 200 TABLET | Refills: 1 | Status: SHIPPED | OUTPATIENT
Start: 2024-12-10

## 2024-12-10 RX ORDER — PHENTERMINE HYDROCHLORIDE 37.5 MG/1
37.5 TABLET ORAL
Qty: 30 TABLET | Refills: 0 | Status: SHIPPED | OUTPATIENT
Start: 2024-12-10

## 2024-12-10 RX ORDER — FLUTICASONE PROPIONATE 50 MCG
2 SPRAY, SUSPENSION (ML) NASAL 2 TIMES DAILY
Qty: 18 ML | Refills: 11 | Status: SHIPPED | OUTPATIENT
Start: 2024-12-10

## 2024-12-10 ASSESSMENT — PATIENT HEALTH QUESTIONNAIRE - PHQ9: SUM OF ALL RESPONSES TO PHQ QUESTIONS 1-9: 12

## 2024-12-10 NOTE — PROGRESS NOTES
Pacheco is a 34 year old who is being evaluated via a billable video visit.    How would you like to obtain your AVS? MyChart  If the video visit is dropped, the invitation should be resent by: Text to cell phone: [phone]  Will anyone else be joining your video visit? No      Assessment & Plan     Severe obesity (BMI 35.0-35.9 with comorbidity) (H)  -on max dose of ozempic for T2DM and suspicious for hypoglycemia at times  -recommend adding on phentermine.  Side effects of medication(s) discussed as well as risks/benefits of taking    -plan to follow-up in clinic in 2-3 weeks to recheck vitals and tolerability.    - phentermine (ADIPEX-P) 37.5 MG tablet; Take 1 tablet (37.5 mg) by mouth every morning (before breakfast).    Hypoglycemia  -describes what sounds like hypoglycemia  -discussed s/s of this and how to treat at home as well as the importance of checking blood sugars when these episodes occur.  Developed a plan of juice or denise crackers (or both) to have on hand at all times and to take when s/s occur.    -discussed alarm signs and symptoms to monitor for and discussed when to be reevaluated in the UC or ED     Neuropathic pain  -stable  - acetaminophen (TYLENOL) 500 MG tablet; Take 2 tablets (1,000 mg) by mouth 3 times daily. With gabapentin    Epigastric pain  - omeprazole (PRILOSEC) 20 MG DR capsule; Take 1 capsule (20 mg) by mouth daily.    Chronic rhinitis  - fluticasone (FLONASE) 50 MCG/ACT nasal spray; Spray 2 sprays into both nostrils 2 times daily.    The longitudinal plan of care for the diagnosis(es)/condition(s) as documented were addressed during this visit. Due to the added complexity in care, I will continue to support Pacheco in the subsequent management and with ongoing continuity of care.     Subjective   Pacheco is a 34 year old, presenting for the following health issues:  Recheck Medication      12/10/2024    12:07 PM   Additional Questions   Roomed by Alondra   Accompanied by self  "    Video Start Time:     -would like an increase in his ozempic dose  -has noticed that his cravings for certain foods increases with days away from his last injection  -has not noticed much weight change  -Wt 218lbs, height 5'6\" BMI 35.2  -started GLP about 3 months ago    -reviewed A1C data, had a significant drop in a short amount of time  -unsure he is checking his sugars regularly  -mentions an episode where he felt suddenly sweaty, anxious and \"like I needed to eat right away\".  He was unsure of what was happening.  Did not check his blood sugar.  Laid down and feel asleep for \"hours\" and when he got up ate something and felt better           Objective           Vitals:  No vitals were obtained today due to virtual visit.    Physical Exam   GENERAL: alert and no distress  EYES: Eyes grossly normal to inspection.  No discharge or erythema, or obvious scleral/conjunctival abnormalities.  RESP: No audible wheeze, cough, or visible cyanosis.    SKIN: Visible skin clear. No significant rash, abnormal pigmentation or lesions.  NEURO: Cranial nerves grossly intact.  Mentation and speech appropriate for age.  PSYCH: Appropriate affect, tone, and pace of words          Video-Visit Details    Type of service:  Video Visit   Video End Time:  Originating Location (pt. Location): Home    Distant Location (provider location):  On-site  Platform used for Video Visit: Lamont  Signed Electronically by: Melissa Nye PA-C     "

## 2024-12-12 ENCOUNTER — PATIENT OUTREACH (OUTPATIENT)
Dept: CARE COORDINATION | Facility: CLINIC | Age: 34
End: 2024-12-12
Payer: COMMERCIAL

## 2024-12-12 NOTE — PROGRESS NOTES
FRW UPDATE:  12/12/24:Frw called pt. Pt has already completed renewal and is on Medicare and [place]. Pt stated he is also on Cadi and does not need FRW needs at this time.

## 2024-12-18 ENCOUNTER — PATIENT OUTREACH (OUTPATIENT)
Dept: CARE COORDINATION | Facility: CLINIC | Age: 34
End: 2024-12-18
Payer: COMMERCIAL

## 2024-12-18 NOTE — PROGRESS NOTES
Clinic Care Coordination Contact  Care Coordination Clinician Chart Review    Situation: Patient chart reviewed by Care Coordinator.       Background: Care Coordination Program started: 6/14/2024. Initial assessment completed and patient-centered care plan(s) were developed with participation from patient. Lead CC handed patient off to [facility] for continued outreaches.       Assessment: Per chart review, patient outreach completed by CC FRWon 12.12.  Patient is actively working to accomplish goal(s). Patient's goal(s) appropriate and relevant at this time. Patient is not due for updated Plan of Care.  Assessments will be completed annually or as needed/with change of patient status.      Care Plan: General- Seek medication review/education       Problem: HP GENERAL PROBLEM       Goal: General Goal - I would like to seek medication review/education with care teams by the next 1-2 week.       Start Date: 9/26/2024    This Visit's Progress: 80% Recent Progress: 80%    Note:     Measure of Success: NA  Barriers: Take med wrong.   Strengths: Seek medication education/review with CCC RN 9/26/2024 and seek further advised.   Patient expressed understanding of goal: yes    Action steps to achieve this goal:  1. I will attend appt on 9/26/2024 at 3:00PM with CCC RN via phone visit to seek med review/education and further advise. (Completed; per pt on 9/30/2024).  2. I will attend appt on 10/01/2024 at 9:00AM with [facility]  via phone visit per [facility] RN suggested to support with medicaid. (Completed)  3. I will attend appt on 11/05/2024 with Dr. Nicolas (PCP) to review ibuprofen and gabapentin med concerns and further advise. (Completed- provider is unavailable per pt on 12/05/2024)  4. I will call PCP office and the neurologist office with any other questions. (Updated: 11/01/2024)  5. I will attend appt on 12/10/2024 with Melissa Nye PA-C to review medication concerns. (Updated: 12/05/2024)                         "    Care Plan: General       Problem: HP GENERAL PROBLEM       Goal: General Goal - I would like to consulted with someone regarding to health care renewal issues and seek further advised before end of December 2024.       Start Date: 9/30/2024    This Visit's Progress: 50% Recent Progress: 50%    Note:     Measure of Success: NA  Barriers: Patient shared; Possible renewal issues; not eligible for \"medicaid\" per outgoing called conversation with Medicaid staff on 9/27/2024.   Strengths: Work with CCC SW/CHW team and FRW dept for further support.   Patient expressed understanding of goal: yes    Action steps to achieve this goal:  1. I will attend appt on 10/01/2024 at 9:00AM with [facility]  via phone visit per [facility] RN suggested to support with medicaid. (Completed)  2. I will wait to hear from [facility] FRW dept regard to the FRW referral placed on 9/30/2024 by CHW at my request. (Completed)  3. I will update status with CHW at the next outreach follow up. (Updated: 12/05/2024)- continues  4. I will continue to work with FRW dept toward goal completion. (Updated:  12/05/2024)- continues  5. I will wait to hear from FRW dept regarding to referral place on 12/05/2024 to discuss renewal health care insurance concerns. (Updated: 12/05/2024)      Note/comment:   -FRW and pt connected on 11/12/2024. (Date: 12/05/2024; MX)  -Renewal health care concerns continues. New FRW referral placed 12/05/2024. (Date: 12/05/2024; MX)                                     Plan/Recommendations: The patient will continue working with Care Coordination to achieve goal(s) as above. CHW will continue outreaches at minimum every 30 days and will involve Lead CC as needed or if patient is ready to move to Maintenance. Lead CC will continue to monitor CHW outreaches and patient's progress to goal(s) every 6 weeks.     Plan of Care updated and sent to patient: Yes, via [employer]      "

## 2024-12-18 NOTE — LETTER
[facility]  Patient Centered Plan of Care  About Me:        Patient Name:  Pacheco Jimenez    YOB: 1990  Age:         34 year old   Brandi MRN:    [id number] Telephone Information:  Home Phone [phone]   Mobile [phone]   Mobile Not on file.   Home Phone Not on file.   Home Phone Not on file.       Address:  [address] [place], [place] [address] Email address:  [email or web address]      Emergency Contact(s)    Name Relationship Lgl Grd Work Phone Home Phone Mobile Phone   1. KECIA JMIENEZ Mother    [phone]   2. GRETCHEN JIMENEZ Brother    [phone]   3. FABIANA JIMENEZ Sister    [phone]           Primary language:  English     needed? No   [employer]:  [phone] op. 1  Other communication barriers:None    Preferred Method of Communication:     Current living arrangement: I live in a private home with family    Mobility Status/ Medical Equipment: Independent w/Device        Health Maintenance  Health Maintenance Reviewed: Due/Overdue   Health Maintenance Due   Topic    MEDICARE ANNUAL WELLNESS VISIT     URINE DRUG SCREEN     A1C            My Access Plan  Medical Emergency 911   Primary Clinic Line [facility] - [phone]   24 Hour Appointment Line [phone] or  [phone]) (toll-free)   24 Hour Nurse Line [phone] (toll-free)   Preferred Urgent Care [facility], [phone]     Preferred Hospital No data recorded   Preferred Pharmacy [facility] - [place], [place] - [address]     Behavioral Health Crisis Line The National Suicide Prevention Lifeline at 1-583.781.8127 or Text/Call 108           My Care Team Members  Patient Care Team         Relationship Specialty Notifications Start End    Lary Nicolas MD PCP - General Family Medicine  3/15/24     Phone: [phone] Fax: [phone]         [address] [place] [address]    Marcial Rojas MD Physician  Neurology  4/14/23     Phone: [phone] Fax: [phone]         [address] [place] [address]    Lary Nicolas MD Assigned PCP   4/15/23     Phone: [phone] Fax: [phone]         [address] [place] [address]    Lucas Hernandez [facility] Therapist Licensed Mental Health  3/15/24     LEIGH faxed to the ED 3/15/24    Phone: [phone] Fax: [phone] [address] [place] [address]    Milton Denise MD Psychiatrist Psychiatry  3/15/24     LEIGH faxed to the ED 3/15/24    Phone: [phone] Fax: [phone] [address] [facility] [address]    Breezy Pathak [facility] Assigned MTM Pharmacist   4/23/24     Phone: [phone] Fax: [phone]         [address] [facility] [address]    Radha Michaud RD Registered Dietitian Diabetes Education  5/28/24     Phone: [phone] Fax: [phone]         [address] [place] [address]    Goldie Sloan OD MD Optometry  5/29/24     Phone: [phone] Fax: [phone]         [address] [facility] [address]    Nancy Pineda CHW Community Health Worker  Admissions 6/19/24     Phone: [phone]         Romaine Bowser DPM Assigned Surgical Provider   7/23/24     Phone: [phone] Fax: [phone]         [address] [facility] [address] [facility] [address]    Charly Chu MD MD Pain Medicine  8/30/24     Phone: [phone] Fax: [phone]         [address] [place] [address]    Michelle Luke RN Lead Care Coordinator Primary Care - CC Admissions 9/26/24     Mine Holland RD Food Resource Navigator   10/1/24     Phone: [phone] Fax: [phone] [address] [facility] [address]    Gabriela Alfonso [facility] Pharmacist   10/8/24                 My Care Plans  Self Management and Treatment Plan    Care Plan  Care Plan: General- Seek medication review/education       Problem: HP GENERAL PROBLEM       Goal: General Goal - I would like to seek medication review/education  "with care teams by the next 1-2 week.       Start Date: 9/26/2024    This Visit's Progress: 80% Recent Progress: 80%    Note:     Measure of Success: NA  Barriers: Take med wrong.   Strengths: Seek medication education/review with CCC RN 9/26/2024 and seek further advised.   Patient expressed understanding of goal: yes    Action steps to achieve this goal:  1. I will attend appt on 9/26/2024 at 3:00PM with CCC RN via phone visit to seek med review/education and further advise. (Completed; per pt on 9/30/2024).  2. I will attend appt on 10/01/2024 at 9:00AM with [facility]  via phone visit per [facility] RN suggested to support with medicaid. (Completed)  3. I will attend appt on 11/05/2024 with Dr. Nicolas (PCP) to review ibuprofen and gabapentin med concerns and further advise. (Completed- provider is unavailable per pt on 12/05/2024)  4. I will call PCP office and the neurologist office with any other questions. (Updated: 11/01/2024)  5. I will attend appt on 12/10/2024 with Melissa Nye PA-C to review medication concerns. (Updated: 12/05/2024)                            Care Plan: General       Problem: HP GENERAL PROBLEM       Goal: General Goal - I would like to consulted with someone regarding to health care renewal issues and seek further advised before end of December 2024.       Start Date: 9/30/2024    This Visit's Progress: 50% Recent Progress: 50%    Note:     Measure of Success: NA  Barriers: Patient shared; Possible renewal issues; not eligible for \"medicaid\" per outgoing called conversation with Medicaid staff on 9/27/2024.   Strengths: Work with CCC SW/CHW team and FRW dept for further support.   Patient expressed understanding of goal: yes    Action steps to achieve this goal:  1. I will attend appt on 10/01/2024 at 9:00AM with [facility]  via phone visit per [facility] RN suggested to support with medicaid. (Completed)  2. I will wait to hear from [facility] FRW dept regard to the FRW referral " placed on 9/30/2024 by CHW at my request. (Completed)  3. I will update status with CHW at the next outreach follow up. (Updated: 12/05/2024)- continues  4. I will continue to work with FRW dept toward goal completion. (Updated:  12/05/2024)- continues  5. I will wait to hear from FRW dept regarding to referral place on 12/05/2024 to discuss renewal health care insurance concerns. (Updated: 12/05/2024)      Note/comment:   -FRW and pt connected on 11/12/2024. (Date: 12/05/2024; MX)  -Renewal health care concerns continues. New FRW referral placed 12/05/2024. (Date: 12/05/2024; MX)                                  Action Plans on File:                       Advance Care Plans/Directives:   Advanced Care Plan/Directives on file: No    Discussed with patient/caregiver(s): Declined Further Information             My Medical and Care Information  Problem List   Patient Active Problem List   Diagnosis    Allergies    Vitamin D deficiency    Hyperlipidemia    Major Depression Severe Single Episode    Arnold-Chiari malformation, type II (H)    Obstructive sleep apnea    Syringomyelia (H)    Epilepsy And Recurrent Seizures    Keloid scar of skin    Dysphagia    Recurrent occipital headache     Shunt    Neuropathic pain    Neurogenic bladder    Neurogenic bowel    Controlled substance agreement signed    Angular cheilitis    Cognitive deficits    Sleep disturbances    Hydrocephalus with operating  shunt (H)    Encounter for therapeutic drug monitoring    Diabetes mellitus, type 2 (H)    Morbid obesity (H)    Bleeds easily (H)    Vitamin B12 deficiency (non anemic)    Spasticity    Abnormal LFTs (liver function tests)    Transportation insecurity    Weakness of both lower extremities    Herpes keratitis of left eye    Cauda equina spinal cord injury, sequela      Current Medications:  Please refer to the most recent medication list provided to you by your medical team and reach out to your provider with any questions or to  make any corrections.    Care Coordination Start Date: 6/14/2024   Frequency of Care Coordination: monthly, more frequently as needed     Form Last Updated: 12/18/2024

## 2024-12-24 ENCOUNTER — VIRTUAL VISIT (OUTPATIENT)
Dept: PHARMACY | Facility: CLINIC | Age: 34
End: 2024-12-24

## 2024-12-24 DIAGNOSIS — Z79.4 TYPE 2 DIABETES MELLITUS WITHOUT COMPLICATION, WITH LONG-TERM CURRENT USE OF INSULIN (H): ICD-10-CM

## 2024-12-24 DIAGNOSIS — K59.00 CONSTIPATION, UNSPECIFIED CONSTIPATION TYPE: ICD-10-CM

## 2024-12-24 DIAGNOSIS — Z91.09 OTHER ALLERGY, OTHER THAN TO MEDICINAL AGENTS: Primary | ICD-10-CM

## 2024-12-24 DIAGNOSIS — E66.01 SEVERE OBESITY (BMI 35.0-35.9 WITH COMORBIDITY) (H): ICD-10-CM

## 2024-12-24 DIAGNOSIS — E66.01 MORBID OBESITY (H): ICD-10-CM

## 2024-12-24 DIAGNOSIS — E11.9 TYPE 2 DIABETES MELLITUS WITHOUT COMPLICATION, WITH LONG-TERM CURRENT USE OF INSULIN (H): ICD-10-CM

## 2024-12-24 PROCEDURE — 99207 PR NO CHARGE LOS: CPT | Mod: 95

## 2024-12-24 RX ORDER — SENNA AND DOCUSATE SODIUM 50; 8.6 MG/1; MG/1
1 TABLET, FILM COATED ORAL AT BEDTIME
Qty: 90 TABLET | Refills: 3 | Status: SHIPPED | OUTPATIENT
Start: 2024-12-24

## 2024-12-24 RX ORDER — TIRZEPATIDE 5 MG/.5ML
5 INJECTION, SOLUTION SUBCUTANEOUS
Qty: 2 ML | Refills: 2 | Status: SHIPPED | OUTPATIENT
Start: 2024-12-24

## 2024-12-24 NOTE — Clinical Note
Hi Dr. Nicolas,  I met with Pacheco for a med review follow up and he mentioned appetite has increased since starting Ozempic. I sent an order for Mounjaro and will have him switch to that if covered by insurance. I will follow up with him again in Feb.  Thanks much! Gabriela Alfonso (Hailie), ZahraD, MPH Medication Therapy Management Pharmacist

## 2024-12-24 NOTE — Clinical Note
Blair Louis,  You may have already been contacted/received a referral for this patient, but he said he's supposed to have a cadi waver and is wondering what the status of this is. He's spoken with a  before and mentioned that someone from the county was supposed to reach out, but he has not heard from them yet. I'm hoping this is something our care coordination team can help with?  Regards, Gabriela (Sharona) Naty, PharmD, MPH Medication Therapy Management Pharmacist

## 2024-12-24 NOTE — Clinical Note
Wu Louis,  Sorry for another message, but I realized this patient is not a [facility] patient so I will reach out to the  he's met with before.  Thanks!! Sharona

## 2024-12-24 NOTE — Clinical Note
Blair Onofre,   It looks like you've worked with Pacheco before and I'm hoping you may be able to help - I met with him for a med review and he said he's supposed to have a cadi waver and that someone from the county was supposed to reach out, but he has not heard from them yet. Are you able to connect with him to help make sure his cadi waver is in effect?  Regards,  Gabriela (Sharona) Naty, PharmD, MPH  Medication Therapy Management Pharmacist

## 2024-12-24 NOTE — Clinical Note
Blair Torres,   I met with Pacheco for a med review and he would like the rx for phentermine to be sent to [facility] (it was sent to [facility] pharmacy on 12/10). Is this something you could reroute for him?  Also, just as an FYI, I am seeing if I can get him switched from Ozempic to Mounjaro for additional weight loss benefit.  Regards, Gabriela Alfonso (Hailie), PharmD, MPH Medication Therapy Management Pharmacist

## 2024-12-24 NOTE — PROGRESS NOTES
Medication Therapy Management (MTM) Encounter    ASSESSMENT:                            Medication Adherence/Access: See below for considerations    Diabetes, Weight Management  A1c is at goal of <7%, but is due for recheck (lab order placed by PCP).  Although patient reports no weight loss with Ozempic, he is down ~57 lbs since April (when Ozempic was started). Regardless, he may benefit from switching from Ozempic to Mounjaro for additional appetite suppression/weight loss benefit. Since Miralax isn't effective for constipation, patient would benefit from taking Senna S daily (refills sent) and Miralax as needed. I will ask prescriber to resent prescription for phentermine to the correct pharmacy. Due to episodes of hypoglycemia in the past, advised patient to check blood sugar if having symptoms of hypoglycemia and reviewed how to treat.    PLAN:                            If Mounjaro is covered by insurance, stop Ozempic and start Mounjaro 5 mg weekly.  Take Senna S 1 tablet daily, every day. Okay to take Miralax daily as needed.  I will ask Melissa Nye to resend your prescription for phentermine to [facility].  Check blood sugar if having symptoms of low blood sugar, eat a couple pieces of candy, or a glass of juice or regular soda if blood sugar is less than 70 mg/dL.  I will ask care coordination team to reach out to follow up on the cadi waver.    Follow-up: via video on:  Friday Feb 28, 2025   Appt at 1:00 PM (1 hr)  Video      SUBJECTIVE/OBJECTIVE:                          Pacheco David is a 34 year old male seen via secure video for a follow-up visit. Patient was previously following with MTM pharmacist, Breezy Pathak.    Reason for visit: Diabetes/weight management follow up.    Allergies/ADRs: Reviewed in chart  Past Medical History: Reviewed in chart  Tobacco: He reports that he has never smoked. He has never been exposed to tobacco smoke. He has never used smokeless tobacco.  Alcohol:  "none    Medication Adherence/Access: Patient states his prescription copays range from $10-40. He notes having a cadi waver and wonders if this is supposed to bring the cost of his copays down.    Diabetes , Weight Management  Ozempic 2 mg weekly  Phentermine 37.5 mg every morning - hasn't started  Patient is not experiencing side effects, other than constipation.  Miralax doesn't provide much benefit, but Senna S does.  He has not started phentermine yet because it was sent to the wrong pharmacy.  Fasting blood sugar is typically 98 mg/dL, afternoon blood sugar is 100-120 mg/dL. Reports no blood sugar readings less than 70 mg/dL. He feels shaky, sweaty, and weak when blood sugar is low, notes this last happened in October.  He's been on Ozempic since April 2024 and feels it's only provided a slight benefit.  Cravings and appetite are reduced for 1-2 days after dose, but then he starts having cravings again. He reports he hasn't lost any weight on Ozempic.     Medication considerations:  Metformin caused diarrhea     Eye exam is up to date  Foot exam is up to date    Lab Results   Component Value Date    A1C 5.9 06/14/2024    A1C 9.5 03/27/2024    A1C 7.4 04/07/2023    A1C 6.2 03/07/2022    A1C 5.6 07/17/2020     Wt Readings from Last 4 Encounters:   11/11/24 218 lb (98.9 kg)   04/16/24 275 lb (124.7 kg)   03/27/24 275 lb (124.7 kg)   03/15/24 275 lb (124.7 kg)     Estimated body mass index is 35.19 kg/m  as calculated from the following:    Height as of 11/11/24: 5' 6\" (1.676 m).    Weight as of 11/11/24: 218 lb (98.9 kg).    Today's Vitals: There were no vitals taken for this visit.  ----------------    I spent 24 minutes with this patient today. All changes were made via collaborative practice agreement with Lary Nicolas MD.     A summary of these recommendations was sent via [employer].    Gabriela Alfonso (Hailie), ZahraD, MPH  Medication Therapy Management Pharmacist     Telemedicine Visit Details  The patient's " medications can be safely assessed via a telemedicine encounter.  Type of service:  Video Conference via [employer]Well  Originating Location (pt. Location): Home    Distant Location (provider location):  Off-site  Start Time:  11:00 AM  End Time:  11:24 AM     Medication Therapy Recommendations  Constipation, unspecified constipation type   1 Current Medication: polyethylene glycol (MIRALAX) 17 GM/Dose powder   Current Medication Sig: Take 17 g by mouth daily. As needed for constipation   Rationale: More effective medication available - Ineffective medication - Effectiveness   Recommendation: Change Medication - Senna S 8.6-50 MG Tabs   Status: Accepted per CPA   Identified Date: 12/24/2024 Completed Date: 12/24/2024         Morbid obesity (H)   1 Current Medication: Semaglutide, 2 MG/DOSE, (OZEMPIC) 8 MG/3ML pen   Current Medication Sig: Inject 2 mg subcutaneously every 7 days. After 4 weeks on 1 mg dose.   Rationale: More effective medication available - Ineffective medication - Effectiveness   Recommendation: Change Medication - Mounjaro 5 MG/0.5ML Soaj   Status: Accepted per CPA   Identified Date: 12/24/2024 Completed Date: 12/24/2024

## 2024-12-26 NOTE — PROGRESS NOTES
"Medication Therapy Management (MTM) Encounter    ASSESSMENT:                            Medication Adherence/Access: No issues identified    Diabetes:   Patient is now tolerating metformin so should continue. Since stuart was not covered will plan to monitor blood glucose through means of A1c.    PLAN:                            Continue metformin  Increase Ozempic to 0.5 mg weekly as planned    Follow-up: Return in about 4 weeks (around 5/24/2024) for Follow up, with me, using a phone visit.    SUBJECTIVE/OBJECTIVE:                          Pacheco David is a 33 year old male called for a follow-up visit.       Reason for visit: diabetes follow-up.    Allergies/ADRs: Reviewed in chart  Past Medical History: Reviewed in chart  Tobacco: He reports that he has never smoked. He has never been exposed to tobacco smoke. He has never used smokeless tobacco.  Alcohol: none    Medication Adherence/Access: no issues reported    Diabetes   Ozempic 0.25 mg once weekly. Currently on 4th week and planning to increase to the 0.5 mg next.   Metformin XL 2000 mg daily - no longer getting diarrhea.     Patient is not experiencing side effects.  Blood sugar monitoring: never  Current diabetes symptoms: polydipsia, polyphagia, fatigue, and numbness/tingling    Diet/Exercise: Reports that his parents are helping him to reduce carbs. They have been giving him \"the appropriate foods to eat\"       Eye exam in the last 12 months? No    Foot exam is up to date  Urine Albumin:   Lab Results   Component Value Date    UMALCR 14.53 03/27/2024      Lab Results   Component Value Date    A1C 9.5 (H) 03/27/2024         Today's Vitals: There were no vitals taken for this visit.  ----------------      I spent 5 minutes with this patient today. All changes were made via collaborative practice agreement with Lary Nicolas MD. A copy of the visit note was provided to the patient's provider(s).    A summary of these recommendations was sent via " Pharmacist Intervention IV to PO Note    Carlos Chahal is a 35 y.o. male being treated with IV medication famotidine    Patient Data:    Vital Signs (Most Recent):  Temp: 97.3 °F (36.3 °C) (12/26/24 1016)  Pulse: 76 (12/26/24 1123)  Resp: 20 (12/26/24 1123)  BP: (!) 93/54 (12/26/24 1016)  SpO2: 100 % (12/26/24 1123) Vital Signs (72h Range):  Temp:  [96.5 °F (35.8 °C)-98.2 °F (36.8 °C)]   Pulse:  [75-96]   Resp:  [8-23]   BP: ()/(50-86)   SpO2:  [96 %-100 %]      CBC:  Recent Labs   Lab 12/25/24  0345 12/26/24  0412 12/26/24  0510   WBC 14.55* 17.38* 17.74*   RBC 2.67* 2.29* 2.44*   HGB 7.3* 6.3* 6.7*   HCT 23.4* 22.9* 21.3*    170 171   MCV 88 100* 87   MCH 27.3 27.5 27.5   MCHC 31.2* 27.5* 31.5*     CMP:     Recent Labs   Lab 12/24/24  0413 12/25/24  0345 12/26/24  0500   * 297* 265*   CALCIUM 9.8 10.2 10.7*   ALBUMIN <0.6* <0.6* <0.6*   PROT 5.7* 5.5* 5.0*   * 133* 131*   K 5.1 4.7 4.5   CO2 28 31* 29    104 102   BUN 12 18 27*   CREATININE 1.1 1.0 1.0   ALKPHOS 461* 386* 326*   ALT 10 6* <6*   AST 23 18 18   BILITOT 0.2 0.2 0.2       Dietary Orders:  Diet Orders            Tube Feedings/Formulas [facility]; Glucerna 1.2; 15; Other (see comments) (Dobbhoff Tube); Tube Feeding Bag: Tube Feeding (I) starting at 12/26 0806    Amino acid 5% - dextrose 20% (CLINIMIX-E) solution  (1L provides 50gm AA, 200gm CHO (680 kcal/L dextrose), Na 35, K 30, Mg 5, Ca 4.5, Acetate 80, Cl 39, Phos 15) (880 total kcal/L) at 80 mL/hr starting at 12/25 1600    Dietary nutrition supplements per G tube; BID (start now); Néstor - Any flavor starting at 12/23 0032            Based on the following criteria, this patient qualifies for intravenous to oral conversion:  [x] The patients gastrointestinal tract is functioning (tolerating medications via oral or enteral route for 24 hours and tolerating food or enteral feeds for 24 hours).  [x] The patient is hemodynamically stable for 24 hours (heart rate  <100 beats per minute, systolic blood pressure >99 mm Hg, and respiratory rate <20 breaths per minute).  [x] The patient shows clinical improvement (afebrile for at least 24 hours and white blood cell count downtrending or normalized). Additionally, the patient must be non-neutropenic (absolute neutrophil count >500 cells/mm3).  [x] For antimicrobials, the patient has received IV therapy for at least 24 hours.    IV medication famotidine will be changed to oral medication  famotidine    Pharmacist's Name: Dong Dubose  Pharmacist's Extension: [phone]     Camilo.    Breezy Pathak, Pharm. D., BCACP  Medication Therapy Management Pharmacist      Telemedicine Visit Details  Type of service:  Telephone visit  Start Time:  334 pm  End Time: 3:39 PM     Medication Therapy Recommendations  No medication therapy recommendations to display

## 2024-12-30 RX ORDER — PHENTERMINE HYDROCHLORIDE 37.5 MG/1
37.5 TABLET ORAL
Qty: 30 TABLET | Refills: 2 | Status: SHIPPED | OUTPATIENT
Start: 2024-12-30

## 2024-12-30 NOTE — PATIENT INSTRUCTIONS
"Recommendations from today's MTM visit:                                                       If Mounjaro is covered by insurance, stop Ozempic and start Mounjaro 5 mg weekly.  Take Senna S 1 tablet daily, every day. Okay to take Miralax daily as needed.  I will ask Melissa Alegrebrigid to resend your prescription for phentermine to [facility].  Check blood sugar if having symptoms of low blood sugar, eat a couple pieces of candy, or a glass of juice or regular soda if blood sugar is less than 70 mg/dL.  I will ask care coordination team to reach out to follow up on the cadi waver.    Follow-up: via video on:  Friday Feb 28, 2025   Appt at 1:00 PM (1 hr)  Video      It was great speaking with you today.  I value your experience and would be very thankful for your time in providing feedback in our clinic survey. In the next few days, you may receive an email or text message from [employer] with a link to a survey related to your  clinical pharmacist.\"     To schedule another MTM appointment, please call the clinic directly or you may call the MTM scheduling line at [phone] or toll-free at [phone].     My Clinical Pharmacist's contact information:                                                      Please feel free to contact me with any questions or concerns you have.      Gabriela Alfonso (Hailie), PharmD, MPH  Medication Therapy Management Pharmacist    "

## 2025-01-07 ENCOUNTER — PATIENT OUTREACH (OUTPATIENT)
Dept: CARE COORDINATION | Facility: CLINIC | Age: 35
End: 2025-01-07
Payer: COMMERCIAL

## 2025-01-07 NOTE — PROGRESS NOTES
"Clinic Care Coordination Contact    Situation: Patient chart reviewed by care coordinator.    Background: CCC team received message from FRW team and care team     Assessment: Pt reported to [facility] that     \"he's supposed to have a cadi waver and that someone from the county was supposed to reach out, but he has not heard from them yet.\"    Pt had been working with  CC in past, no longer with clinic so RN CC assigned as lead.    No current goal around Cadi  Waiver    Plan/Recommendations: RN CC will send message to Coverage  CC and request outreach to patient to assess if any additional CC support needed on this topic   Community Health Worker to outreach per standard work and updated on goal progression  RN CC will review in 4-6 weeks to support ongoing recommendations and plan of care will be available sooner if needed.    "

## 2025-01-07 NOTE — PROGRESS NOTES
Reviewed [place]-ITS. Pt has [place] Dx, QM, [facility] SNBC program, Medicare. No waiver. PMI [id number].    CHIO CC sent msg to [facility] via intake form to see if pt has pending/past assessment for waiver program: Thank you for submitting a [employer] intake form. A staff member will be in contact with you within two business days. If you filled out this form, please do not call the [employer] number. Duplicate inquiries delay our ability to respond quickly.    CHIO TOMLINSON will await response.     [facility] plan provides SW/care coordinator. If pt does not know who this is, they can call #[phone].    Gila Thacker, [facility] [place]   Social Work Primary Care Clinic Care Coordinator   [phone]  [email or web address]

## 2025-01-08 ENCOUNTER — TELEPHONE (OUTPATIENT)
Dept: PHARMACY | Facility: CLINIC | Age: 35
End: 2025-01-08
Payer: COMMERCIAL

## 2025-01-15 ENCOUNTER — PATIENT OUTREACH (OUTPATIENT)
Dept: CARE COORDINATION | Facility: CLINIC | Age: 35
End: 2025-01-15
Payer: COMMERCIAL

## 2025-01-15 NOTE — PROGRESS NOTES
Clinic Care Coordination Contact:  Community Health Worker Follow Up    Today's date: 1/15/2025    Reason: [facility] CHW Follow Up Call (follow up current goal's)    Care Gaps:   Health Maintenance Due   Topic Date Due    MEDICARE ANNUAL WELLNESS VISIT  07/17/2021    URINE DRUG SCREEN  04/07/2024    A1C  09/14/2024    DEPRESSION 6 MO INDEX REPEAT PHQ-9  12/31/2024     Patient is aware of due care gaps. Suggested pt to review details and discuss completion at next office visit appt date 2/13/2025 with Dr. Nicolas in [facility]. Pt agreed. Pt annual wellness visit appt is schedule 9/05/2025 with Dr. Nicolas in [facility] (appt schedule prior to today's date).     Care Plan:   Care Plan: Transportation- (Metro Mobility Application) Completed 11/1/2024      Problem: Lack of transportation  Resolved 11/1/2024      Goal: Establish reliable transportation  Completed 11/1/2024      Start Date: 6/18/2024    This Visit's Progress: 100% Recent Progress: 60%    Note:     Barriers: N/A  Strengths: Family support, waiver services  Patient expressed understanding of goal: Yes    Action steps to achieve this goal:  1. I will wait for the metro mobility to be mailed to my home and have my family assist me with completing the document if needed. (Resent week of 7/23/2024)- completed  2. I will reschedule my upcoming appts that I am unable to currently make due to transportation concerns and connect with my family for their support for appointments that they can help me get to. (Completed: 11/01/2024)  3. I will connect with CCC team at least once a month to discuss goal progression and address any other needs/concerns that may arise. (Completed: 11/01/2024)  4. I will update CHW once received the metro mobility application and MTM transportation resources (including steps by steps) from [facility] team via mail. (Completed: received application from CHW per pt on 8/09/2024)  5. I will wait to hear from [facility] team regards to transportation service info. (Completed:  "11/01/2024)  6. I will fill out my part (patient section) of the [employer] Mobility Application and drop off the application to my PCP in the [facility] week of 8/12/2024 for Dr. Nicolas (PCP) to fill out the physician section. (Completed)  7. I will wait to received a copy of the metro mobility form send to me via mail. (Completed- received per pt 9/26/2024)  8. [facility] next step. (Completed: 11/01/2024)    Personal Action Steps:   I will wait to received approval letter and information (re-send) from [facility] to me via mail. I am informed of approval and educated how to request/set up medical ride to future appt. Met goal.   I will connect with [facility] [employer] at phone number \"[phone] in 1-4 business days in advance to request ride.\" Aware of fare fee per ride. No other questions.   I will coordinate with family member(s)/mom, dad, and brother with any assistance and attend future medical appt. No other questions at this time.       Coordinate Care (Date: 11/01/2024; CHW):   Reason: follow up patient [employer] Application status  Agency: \"CONTACT:  [facility] [employer]  [facility]  Monday-Friday, 7:30 AM to 4 PM  Phone: [phone]  TTY: [phone]  Email: [email or web address]\"  Note/comment: Patient is approved for transportation service with [facility] [employer] with a \"fare\" fee amount of $3.50 per one way per ride during non-rush hour, and $4.50 during rush-hour per coordinate care to [facility] today, 11/01/2024.                            Care Plan: General- Seek medication review/education       Problem: HP GENERAL PROBLEM       Goal: General Goal - I would like to seek medication review/education with care teams by the next 1-2 week.       Start Date: 9/26/2024    This Visit's Progress: 90% Recent Progress: 80%    Note:     Measure of Success: NA  Barriers: Take med wrong.   Strengths: " "Seek medication education/review with CCC RN 9/26/2024 and seek further advised.   Patient expressed understanding of goal: yes    Action steps to achieve this goal:  1. I will attend appt on 9/26/2024 at 3:00PM with [facility] RN via phone visit to seek med review/education and further advise. (Completed; per pt on 9/30/2024).  2. I will attend appt on 10/01/2024 at 9:00AM with [facility]  via phone visit per [facility] RN suggested to support with medicaid. (Completed)  3. I will attend appt on 11/05/2024 with Dr. Nicolas (PCP) to review ibuprofen and gabapentin med concerns and further advise. (Completed- provider is unavailable per pt on 12/05/2024)  4. I will call PCP office and the neurologist office with any other questions. (Completed: neurologist refer me to the pain clinic to help with pain management per pt on 1/15/2025)  5. I will attend appt on 12/10/2024 with Melissa Nye PA-C to review medication concerns. (Completed per pt on 1/15/2025)  6. I will continue to follow up with the pain clinic regarding to pain medication management as recommendation. (Updated: 1/15/2025)  7. I will attend appt on 2/13/2025 with Dr. Nicolas in [facility] follow up DM, review due care gaps details/completion, and address any med concerns. (Updated: 1/15/2025)                            Care Plan: General- renewal health care insurance Completed 1/15/2025      Problem: HP GENERAL PROBLEM  Resolved 1/15/2025      Goal: General Goal - I would like to consulted with someone regarding to health care renewal issues and seek further advised before end of December 2024.  Completed 1/15/2025      Start Date: 9/30/2024 Expected End Date: 1/15/2025    This Visit's Progress: 100% Recent Progress: 50%    Note:     Measure of Success: NA  Barriers: Patient shared; Possible renewal issues; not eligible for \"medicaid\" per outgoing called conversation with Medicaid staff on 9/27/2024.   Strengths: Work with CCC SW/CHW team and W dept for further " "support.   Patient expressed understanding of goal: yes    Action steps to achieve this goal:  1. I will attend appt on 10/01/2024 at 9:00AM with [facility]  via phone visit per [facility] RN suggested to support with medicaid. (Completed)  2. I will wait to hear from [facility] FRW dept regard to the FRW referral placed on 9/30/2024 by CHW at my request. (Completed)  3. I will update status with CHW at the next outreach follow up. (Completed)  4. I will continue to work with FRW dept toward goal completion. (Completed)  5. I will wait to hear from FRW dept regarding to referral place on 12/05/2024 to discuss renewal health care insurance concerns. (Completed)    Note/comment:   -FRW and pt connected on 11/12/2024. (Date: 12/05/2024; MX)  -Renewal health care concerns continues. New FRW referral placed 12/05/2024. (Date: 12/05/2024; MX)    Personal actions steps:   I have been working with the W and the [facility] and completed with renewal health care insurance. Able to switched my insurance to [employer]. Problem solved. Met goal.   I will connect with assigned county worker in the future with any questions.                             Per renewal goal reviewed;   New FRW referral placed 12/05/2024 by writer/CHW.    Patient chart reviewed:  -Per CC FRW encounter dated 12/12/2024 notes reviewed;   \"FRW UPDATE:  12/12/24:Frw called pt. Pt has already completed renewal and is on Medicare and [place]. Pt stated he is also on Cadi and does not need FRW needs at this time.\"    Patient Outreach Discussion:   CC CHW was able to connect with patient today regard to reason(s) above. Check in how patient is doing and any questions or concerns at this time. Updated and completed goals above with patient.     Patient shared:  -Medication review/education goal updated: Completed visit appt with the neurologist and refer to pain clinic. Spoke with pain clinic today about pain medication and make some dosage adjustment.   -Health care insurance " renewal goal updated: have been working with FRW and the [facility] and health care insurance renewal completed. Health Care insurance switched to [facility] beginning of January 2025. No concerns at this time.  -Doing good.     CHW suggested patient for the following:  -Pt connect CCC/CHW with any additional resource needs and PCP office for scheduling appt.     Appt reminder:   Patient is reminded to future appt date(s) below. Pt is aware hat he will get a appt reminder when it get closer to the appt date. Pt confirmed.  Name: Pacheco David MRN: [id number]     Date: 2/13/2025 Status: Scheduled     Time: 1:40 PM Length: 20     Visit Type: OFFICE VISIT [2421] Copay: $0.00     Provider: [facility] Nicolas MD [facility]       Notes: DM check & (review due care gaps per pt on 1/15/2025)              Pt is aware annual wellness visit appt is schedule (prior to today's date) and is not until September 05, 2025. Pt confirmed.  Name: Pacheco David MRN: [id number]     Date: 9/5/2025 Status: Scheduled     Time: 9:20 AM Length: 40     Visit Type: MYC MEDICARE ANNUAL WELLNESS [2503] Copay: $0.00     Provider: Lary Nicolas MD [facility]       Notes: My nerve pain status and what is going on with me and a few other problems       CHW Next Follow-up: Goal(s) follow up. Informed patient of due care gaps (if any).     Outreach frequency: monthly      CHW Plan:   -Next CHW outreach plan: 1 month to monitor the progression of their goal(s).

## 2025-02-05 ENCOUNTER — TRANSFERRED RECORDS (OUTPATIENT)
Dept: HEALTH INFORMATION MANAGEMENT | Facility: CLINIC | Age: 35
End: 2025-02-05
Payer: COMMERCIAL

## 2025-02-19 ENCOUNTER — PATIENT OUTREACH (OUTPATIENT)
Dept: CARE COORDINATION | Facility: CLINIC | Age: 35
End: 2025-02-19
Payer: COMMERCIAL

## 2025-02-19 NOTE — PROGRESS NOTES
Clinic Care Coordination Contact  Care Coordination Clinician Chart Review    Situation: Patient chart reviewed by Care Coordinator.       Background: Care Coordination Program started: 6/14/2024. Initial assessment completed and patient-centered care plan(s) were developed with participation from patient. Lead CC handed patient off to CHW for continued outreaches.       Assessment: Per chart review, patient outreach completed by CC CHW on 1.15  Pt canceled schedule visit with PCP   Future Appointments   Date Time Provider Department Center   2/28/2025  1:00 PM Gabriela Alfonso, [facility]   4/7/2025  9:40 AM Lary Nicolas MD [facility]   9/5/2025  9:20 AM Lary Nicolas MD [facility]         .  Patient is actively working to accomplish goal(s). Patient's goal(s) appropriate and relevant at this time. Patient is not due for updated Plan of Care.  Assessments will be completed annually or as needed/with change of patient status.      Care Plan: General- Seek medication review/education       Problem: HP GENERAL PROBLEM       Goal: General Goal - I would like to seek medication review/education with care teams by the next 1-2 week.       Start Date: 9/26/2024    This Visit's Progress: 90% Recent Progress: 80%    Note:     Measure of Success: NA  Barriers: Take med wrong.   Strengths: Seek medication education/review with CCC RN 9/26/2024 and seek further advised.   Patient expressed understanding of goal: yes    Action steps to achieve this goal:  1. I will attend appt on 9/26/2024 at 3:00PM with CCC RN via phone visit to seek med review/education and further advise. (Completed; per pt on 9/30/2024).  2. I will attend appt on 10/01/2024 at 9:00AM with [facility]  via phone visit per CCC RN suggested to support with medicaid. (Completed)  3. I will attend appt on 11/05/2024 with Dr. Nicolas (PCP) to review ibuprofen and gabapentin med concerns and further advise. (Completed- provider is  unavailable per pt on 12/05/2024)  4. I will call PCP office and the neurologist office with any other questions. (Completed: neurologist refer me to the pain clinic to help with pain management per pt on 1/15/2025)  5. I will attend appt on 12/10/2024 with Melissa Nye PA-C to review medication concerns. (Completed per pt on 1/15/2025)  6. I will continue to follow up with the pain clinic regarding to pain medication management as recommendation. (Updated: 1/15/2025)  7. I will attend appt on 2/13/2025 with Dr. Nicolas in [facility] follow up DM, review due care gaps details/completion, and address any med concerns. (Updated: 1/15/2025)                                 Plan/Recommendations: The patient will continue working with Care Coordination to achieve goal(s) as above. CHW will continue outreaches at minimum every 30 days and will involve Lead CC as needed or if patient is ready to move to Maintenance. Lead CC will continue to monitor CHW outreaches and patient's progress to goal(s) every 6 weeks.     Plan of Care updated and sent to patient: No

## 2025-02-20 ENCOUNTER — MYC REFILL (OUTPATIENT)
Dept: FAMILY MEDICINE | Facility: CLINIC | Age: 35
End: 2025-02-20
Payer: COMMERCIAL

## 2025-02-20 ENCOUNTER — MYC REFILL (OUTPATIENT)
Dept: PHARMACY | Facility: CLINIC | Age: 35
End: 2025-02-20
Payer: COMMERCIAL

## 2025-02-20 DIAGNOSIS — Z79.4 TYPE 2 DIABETES MELLITUS WITHOUT COMPLICATION, WITH LONG-TERM CURRENT USE OF INSULIN (H): ICD-10-CM

## 2025-02-20 DIAGNOSIS — M79.2 NEUROPATHIC PAIN: ICD-10-CM

## 2025-02-20 DIAGNOSIS — E78.5 HYPERLIPIDEMIA, UNSPECIFIED HYPERLIPIDEMIA TYPE: Chronic | ICD-10-CM

## 2025-02-20 DIAGNOSIS — J31.0 CHRONIC RHINITIS: ICD-10-CM

## 2025-02-20 DIAGNOSIS — E66.01 SEVERE OBESITY (BMI 35.0-35.9 WITH COMORBIDITY) (H): ICD-10-CM

## 2025-02-20 DIAGNOSIS — R10.13 EPIGASTRIC PAIN: ICD-10-CM

## 2025-02-20 DIAGNOSIS — E11.9 TYPE 2 DIABETES MELLITUS WITHOUT COMPLICATION, WITH LONG-TERM CURRENT USE OF INSULIN (H): ICD-10-CM

## 2025-02-20 RX ORDER — IBUPROFEN 800 MG/1
800 TABLET, FILM COATED ORAL EVERY 8 HOURS PRN
Qty: 100 TABLET | Refills: 0 | Status: SHIPPED | OUTPATIENT
Start: 2025-02-20

## 2025-02-20 RX ORDER — ROSUVASTATIN CALCIUM 20 MG/1
20 TABLET, COATED ORAL DAILY
Qty: 90 TABLET | Refills: 0 | Status: SHIPPED | OUTPATIENT
Start: 2025-02-20

## 2025-02-20 RX ORDER — TIRZEPATIDE 5 MG/.5ML
5 INJECTION, SOLUTION SUBCUTANEOUS
Qty: 6 ML | Refills: 4 | Status: SHIPPED | OUTPATIENT
Start: 2025-02-20

## 2025-02-20 RX ORDER — ACETAMINOPHEN 500 MG
1000 TABLET ORAL 3 TIMES DAILY
Qty: 200 TABLET | Refills: 0 | Status: SHIPPED | OUTPATIENT
Start: 2025-02-20

## 2025-02-20 RX ORDER — GABAPENTIN 600 MG/1
TABLET ORAL
Qty: 210 TABLET | Refills: 0 | Status: SHIPPED | OUTPATIENT
Start: 2025-02-20

## 2025-02-20 RX ORDER — OMEPRAZOLE 20 MG/1
20 CAPSULE, DELAYED RELEASE ORAL DAILY
Qty: 90 CAPSULE | Refills: 3 | Status: SHIPPED | OUTPATIENT
Start: 2025-02-20

## 2025-02-20 RX ORDER — PHENTERMINE HYDROCHLORIDE 37.5 MG/1
37.5 TABLET ORAL
Qty: 30 TABLET | Refills: 2 | OUTPATIENT
Start: 2025-02-20

## 2025-02-20 RX ORDER — TRAZODONE HYDROCHLORIDE 100 MG/1
100 TABLET ORAL AT BEDTIME
Qty: 90 TABLET | Refills: 4 | Status: SHIPPED | OUTPATIENT
Start: 2025-02-20

## 2025-02-20 RX ORDER — FLUTICASONE PROPIONATE 50 MCG
2 SPRAY, SUSPENSION (ML) NASAL 2 TIMES DAILY
Qty: 18 ML | Refills: 8 | Status: SHIPPED | OUTPATIENT
Start: 2025-02-20

## 2025-02-20 RX ORDER — LAMOTRIGINE 100 MG/1
100 TABLET ORAL 2 TIMES DAILY
Qty: 180 TABLET | Refills: 3 | Status: SHIPPED | OUTPATIENT
Start: 2025-02-20

## 2025-02-20 NOTE — TELEPHONE ENCOUNTER
"A1c at goal in June.   Due for labs.    Hemoglobin A1c  Lab Results   Component Value Date    A1C 5.9 (H) 06/14/2024    A1C 9.5 (H) 03/27/2024    A1C 7.4 (H) 04/07/2023     Creatinine  Lab Results   Component Value Date    CR 0.64 (L) 08/29/2024     Microalbumin:Cr Ratio  No results found for: \"MSHCGMOM\"  TSH  TSH   Date Value Ref Range Status   06/14/2024 3.80 0.30 - 4.20 uIU/mL Final   07/17/2020 4.69 0.30 - 5.00 uIU/mL Final      LDL  Cholesterol   Date Value Ref Range Status   03/27/2024 169 <200 mg/dL Final   04/07/2023 251 (H) <200 mg/dL Final     Direct Measure HDL   Date Value Ref Range Status   03/27/2024 39 (L) >=40 mg/dL Final   04/07/2023 51 >=40 mg/dL Final     LDL Cholesterol Calculated   Date Value Ref Range Status   03/27/2024 64 <=100 mg/dL Final   04/07/2023 163 (H) <=100 mg/dL Final     LDL Cholesterol Direct   Date Value Ref Range Status   06/14/2024 66 <100 mg/dL Final     Comment:     Age 2-19 years:  Desirable: 0-110 mg/dL   Borderline high: 110-129 mg/dL   High: >= 130 mg/dL    Age 20 years and older:  Desirable: <100mg/dL  Above desirable: 100-129 mg/dL   Borderline high: 130-159 mg/dL   High: 160-189 mg/dL   Very high: >= 190 mg/dL     Triglycerides   Date Value Ref Range Status   03/27/2024 328 (H) <150 mg/dL Final   04/07/2023 185 (H) <150 mg/dL Final     No results found for: \"CHOLHDLRATIO\"    "

## 2025-02-20 NOTE — TELEPHONE ENCOUNTER
Rx transferred.  FABIENNE Johnson, BSN, PHN, RN-[facility]  [phone]

## 2025-02-20 NOTE — TELEPHONE ENCOUNTER
Pain meds refilled.    Pain medication plan:  Gabapentin 1200 mg 7 AM, 1200 mg 2 pm, 1800 mg 10 pm  Tylenol 1000 mg (two 500 mg tabs), three times a day with gabapentin.  Duloxetine 60 mg daily  Lamotrigine 100 twice daily  Ibuprofen 800 three times daily with food  Turmeric 1000 mg twice daily     Future Appointments   Date Time Provider Department Center   2/28/2025  1:00 PM Gabriela Alfonso [facility]   4/7/2025  9:40 AM Lary Nicolas MD [facility]   9/5/2025  9:20 AM Lary Nicolas MD [facility]      Pacheco was seen today for refill request.    Diagnoses and all orders for this visit:    Hyperlipidemia, unspecified hyperlipidemia type  -     rosuvastatin (CRESTOR) 20 MG tablet; Take 1 tablet (20 mg) by mouth daily. TAKE 1 PILL BY MOUTH EVERY DAY AT BEDTIME/TXHUA HMO THAUM MUS PW NOJ 1 LUB. PAB ZOO NTSHAV MUAJ ROJ    Neuropathic pain  -     traZODone (DESYREL) 100 MG tablet; Take 1 tablet (100 mg) by mouth at bedtime.  -     lamoTRIgine (LAMICTAL) 100 MG tablet; Take 1 tablet (100 mg) by mouth 2 times daily.  -     gabapentin (NEURONTIN) 600 MG tablet; Take 2 tablets (1,200 mg) by mouth every morning AND 2 tablets (1,200 mg) daily at 2 pm AND 3 tablets (1,800 mg) at bedtime. Take three tablet at bedtime..

## 2025-02-24 ENCOUNTER — PATIENT OUTREACH (OUTPATIENT)
Dept: CARE COORDINATION | Facility: CLINIC | Age: 35
End: 2025-02-24
Payer: COMMERCIAL

## 2025-02-24 NOTE — PROGRESS NOTES
Food Resource Navigator Contact    FRN - Follow Up    Reason for call: Type 2 Diabetes  Other: food insecurity     Intervention and Education during outreach: Called today to follow-up on how things are going with [employer] medically tailored prepared meals program. Pacheco says that it's going great. It feels like there is just the right amount of food. Sometimes when he has questions he will email [employer] directly. Feels that he does not have additional concerns at this time related to food access/adequacy.     Food Resource Navigator Plan: Follow up as needed    I have discussed the following goals with the patient: Pacheco to continue to receive medically tailored prepared meals through [employer] to improve food access.     Spent 3 minutes in consult with the patient.     Unique Holland   [facility] Food Resource Navigator  [employer]   [phone]

## 2025-02-24 NOTE — PROGRESS NOTES
"Clinic Care Coordination Contact:  Community Health Worker Follow Up    Today's date: 2/24/2025     Reason(s):   -[facility] CHW Follow Up Call (follow up current goal's)- Note attached to CHW outreach today; \"See 1/7 RN CC note.\"  -Verify CC Gila BARROSO notes found in the encounter dated 1/07/2025 with patient    Care Gaps:   Health Maintenance Due   Topic Date Due    MEDICARE ANNUAL WELLNESS VISIT  07/17/2021    URINE DRUG SCREEN  04/07/2024    A1C  09/14/2024    DEPRESSION 6 MO INDEX REPEAT PHQ-9  12/31/2024     Patient agreed to review/discuss details with Dr. Nicolas in clinic at next office visit appt 4/07/2025. Noted pt agreed to the pt appt date (fyi: appt desk).    Care Plan:   Care Plan: General- Seek medication review/education       Problem: HP GENERAL PROBLEM       Goal: General Goal - I would like to seek medication review/education with care teams by the next 1-2 week.       Start Date: 9/26/2024    This Visit's Progress: 90% Recent Progress: 90%    Note:     Measure of Success: NA  Barriers: Take med wrong.   Strengths: Seek medication education/review with CCC RN 9/26/2024 and seek further advised.   Patient expressed understanding of goal: yes    Action steps to achieve this goal:  1. I will attend appt on 9/26/2024 at 3:00PM with CCC RN via phone visit to seek med review/education and further advise. (Completed; per pt on 9/30/2024).  2. I will attend appt on 10/01/2024 at 9:00AM with [facility]  via phone visit per [facility] RN suggested to support with medicaid. (Completed)  3. I will attend appt on 11/05/2024 with Dr. Nicolas (PCP) to review ibuprofen and gabapentin med concerns and further advise. (Completed- provider is unavailable per pt on 12/05/2024)  4. I will call PCP office and the neurologist office with any other questions. (Completed: neurologist refer me to the pain clinic to help with pain management per pt on 1/15/2025)  5. I will attend appt on 12/10/2024 with Melissa Nye PA-C to " review medication concerns. (Completed per pt on 1/15/2025)  6. I will continue to follow up with the pain clinic regarding to pain medication management as recommendation. (Updated: 2/24/2025)- continues  7. I will attend appt on 2/13/2025 with Dr. Nicolas in [facility] follow up DM, review due care gaps details/completion, and address any med concerns. (Completed)  8. I will attend appt on 2/28/2025 with MTM dept and seek med concern/questions if any. (Updated: 2/24/2025)  9. I will attend appt on 4/07/2025 and 9/05/2025 at 9:20AM with Dr. Nicolas in clinic as schedule. Will review med and seek medical questions if any. (Updated: 2/24/2025)                            Care Plan: General       Problem: HP GENERAL PROBLEM       Goal: General Goal - I would like to find out CADI Waiver status and verify waiver eligibility/available at this time with [facility] dept by the next 1-3 months.       Start Date: 2/24/2025    This Visit's Progress: 10%    Note:     Measure of Success: NA  Barriers: Patient shared; seeking service/waiver to helps paying for medical appt coverage.   Strengths: Working with [facility] deptLeno for further assistance and verify CADI Waiver eligibility. Apply if hasn't yet.   Patient expressed understanding of goal: yes    Action steps to achieve this goal  1. I will wait to hear from Leno Pineda, representative with [facility] dept.   2. I will follow up with Leno Pineda with [facility] dept at [phone] if need.   3. I will update status with Care Coordination team at the next outreach follow up.     Note/comment:   -FYI: See CC RN encounnter dated 1/07/2025 for CC SW notes details if need. (CHW; 2/24/2025)                             Patient Outreach:   CHW was able to connect with patient regarding to reason's above. Verified and relayed CC SW notes found in CC RN encounter dated 1/07/2025 with patient. Check in how patient is doing.     Patient shared:  "  -Hasn't hear from Leno Pineda with the [facility]. Would like to verify CADI waiver status, and verify type of waiver available for me and to helps with going to medical appt.   -Doing good. Have a phone appt with [facility] at 1:15PM.     Coordinate Care:   Reason: CADI Waiver and verify waiver(s) available for patient at this time per pt request.     12:52PM: Patient and CHW conference call to Leno Pineda, with \"[facility]\" at phone number [phone] (fyi: phone number found in CC RN encounter dated 2025) and no answer. Left a voice message on Leno Pineda voicemail today follow up CC SW voice message left for Leno on \"2025\" and request Leno to return call to patient (preferences) for further update. Pt full name,  and phone number includes per patient verbal permission. CHW name and office phone number included to the voice message if needs. End call with conference call to Leno.    Patient is reminded to all future appt date(s) below. Pt add a new goal- verify CADI Waiver status and eligibility. Updated current goal above.     Suggested pt to seek medication concerns and medical questions/concerns with MTM/PharmD and PCP and take all med as instructions, pt may connect with CC team if additional med education/med review needs with CC RN, wait to hear from Leno Bernalong with [facility] dept for return call, and pt may follow up status with Leno Pineda if need.     Patient confirmed and have no other questions at this time.     Appt desk reviewed:   Patient is reminded to all future appt date(s) below. Pt confirmed.  Name: Pacheco David MRN: [id number]     Date: 2025 Status: Scheduled     Time: 1:00 PM Length: 60     Visit Type: MTM NEW [1788] Copay: $0.00     Provider: Gabriela Alfonso RPH Department:  MTM PRIMARY       Notes: mtm follow up (CMR)     Name: Pacheco David MRN: [id number]     Date: 2025 Status: Scheduled     Time: 9:40 AM Length: 20     Visit Type: OFFICE VISIT " [2421] Copay: $0.00     Provider: Lary Nicolas MD Department: [facility]       Notes: DM check & (review due care gaps per pt on 1/15/2025 & 2/24/2025)     Name: Pacheco David MRN: [id number]     Date: 9/5/2025 Status: Scheduled     Time: 9:20 AM Length: 40     Visit Type: MYC MEDICARE ANNUAL WELLNESS [8068] Copay: $0.00     Provider: Lary Nicolas MD Department: [facility] FAMILY MEDICINE/OB       Notes: My nerve pain status and what is going on with me and a few other problems     CHW Next Follow-up: Goal(s) follow up. Informed patient of due care gaps (if any).      Outreach frequency: monthly      CHW Plan:   -Next CHW outreach plan: 1 month to monitor the progression of their goal(s).

## 2025-03-08 ENCOUNTER — HEALTH MAINTENANCE LETTER (OUTPATIENT)
Age: 35
End: 2025-03-08

## 2025-03-18 ENCOUNTER — PATIENT OUTREACH (OUTPATIENT)
Dept: CARE COORDINATION | Facility: CLINIC | Age: 35
End: 2025-03-18
Payer: COMMERCIAL

## 2025-03-18 NOTE — LETTER
[facility]  Patient Centered Plan of Care  About Me:        Patient Name:  Pacheco Jimenez    YOB: 1990  Age:         34 year old   Brandi MRN:    [id number] Telephone Information:  Home Phone [phone]   Mobile [phone]   Mobile Not on file.   Home Phone Not on file.   Home Phone Not on file.       Address:  [address] [place] [address] Email address:  [email or web address]      Emergency Contact(s)    Name Relationship Lgl Grd Work Phone Home Phone Mobile Phone   1. KECIA JIMENEZ Mother    [phone]   2. GRETCHEN JIMENEZ Brother    [phone]   3. FABIANA JIMENEZ Sister    [phone]           Primary language:  English     needed? No   [facility]:  [phone] op. 1  Other communication barriers:None    Preferred Method of Communication:     Current living arrangement: I live in a private home with family    Mobility Status/ Medical Equipment: Independent w/Device        Health Maintenance  Health Maintenance Reviewed: Due/Overdue   Health Maintenance Due   Topic    MEDICARE ANNUAL WELLNESS VISIT     URINE DRUG SCREEN     A1C     DEPRESSION 6 MO INDEX REPEAT PHQ-9     LIPID     MICROALBUMIN     DIABETIC FOOT EXAM            My Access Plan  Medical Emergency 911   Primary Clinic Line [facility] - [phone]   24 Hour Appointment Line [phone] or  [phone]) (toll-free)   24 Hour Nurse Line [phone] (toll-free)   Preferred Urgent Care [facility], [phone]     Preferred Hospital No data recorded   Preferred Pharmacy [facility] - [place], [place] - [address]     Behavioral Health Crisis Line The National Suicide Prevention Lifeline at 1-200.827.5439 or Text/Call 768           My Care Team Members  Patient Care Team         Relationship Specialty Notifications Start End    Lary Nicolas MD PCP - General Family Medicine  3/15/24     Phone: [phone] Fax:  [phone]         [address] [place] [address]    Marcial Rojas MD Physician Neurology  4/14/23     Phone: [phone] Fax: [phone]         [address] [place] [address]    Lary Nicolas MD Assigned PCP   4/15/23     Phone: [phone] Fax: [phone]         [address] [place] [address]    Lucas Hernandez [facility] Therapist Licensed Mental Health  3/15/24     LEIGH faxed to the ED 3/15/24    Phone: [phone] Fax: [phone] [address] [place] [address]    Milton Denise MD Psychiatrist Psychiatry  3/15/24     LEIGH faxed to the ED 3/15/24    Phone: [phone] Fax: [phone] [address] [facility] [address]    Breezy Pathak PENNIE Assigned MTM Pharmacist   4/23/24     Phone: [phone] Fax: [phone]         [address] [facility] [address]    Radha Michaud RD Registered Dietitian Diabetes Education  5/28/24     Phone: [phone] Fax: [phone]         [address] [place] [address]    Goldie Sloan OD MD Optometry  5/29/24     Phone: [phone] Fax: [phone] [address] [facility] [address]    Nancy Pineda, W Community Health Worker  Admissions 6/19/24     Phone: [phone]         Romaine Bowser DPM Assigned Surgical Provider   7/23/24     Phone: [phone] Fax: [phone]         [address] [facility] [address] [facility] [address]    Charly Chu MD MD Pain Medicine  8/30/24     Phone: [phone] Fax: [phone]         [address] [place] [address]    Michelle Luke RN Lead Care Coordinator Primary Care - CC Admissions 9/26/24     Mine Holland RD Food Resource Navigator   10/1/24     Phone: [phone] Fax: [phone] [address] [facility] [address]    Gabriela Alfonso [facility] Pharmacist   10/8/24                 My Care Plans  Self Management and Treatment Plan    Care Plan  Care Plan: General- Seek medication review/education       Problem: HP GENERAL  PROBLEM       Goal: General Goal - I would like to seek medication review/education with care teams by the next 1-2 week.       Start Date: 9/26/2024    Recent Progress: 90%    Note:     Measure of Success: NA  Barriers: Take med wrong.   Strengths: Seek medication education/review with CCC RN  and seek further advised.   Patient expressed understanding of goal: yes    Action steps to achieve this goal:  1. I will attend appt on 9/26/2024 at 3:00PM with CCC RN via phone visit to seek med review/education and further advise. (Completed; per pt on 9/30/2024).  2. I will attend appt on 10/01/2024 at 9:00AM with [facility]  via phone visit per [facility] RN suggested to support with medicaid. (Completed)  3. I will attend appt on 11/05/2024 with Dr. Nicolas (PCP) to review ibuprofen and gabapentin med concerns and further advise. (Completed- provider is unavailable per pt on 12/05/2024)  4. I will call PCP office and the neurologist office with any other questions. (Completed: neurologist refer me to the pain clinic to help with pain management per pt on 1/15/2025)  5. I will attend appt on 12/10/2024 with Melissa Nye PA-C to review medication concerns. (Completed per pt on 1/15/2025)  6. I will continue to follow up with the pain clinic regarding to pain medication management as recommendation. (Updated: 2/24/2025)- continues  7. I will attend appt on 2/13/2025 with Dr. Nicolas in [facility] follow up DM, review due care gaps details/completion, and address any med concerns. (Completed)  8. I will attend appt on 2/28/2025 with MTM dept and seek med concern/questions if any. (Updated: 2/24/2025)  9. I will attend appt on 4/07/2025 and 9/05/2025 at 9:20AM with Dr. Nicolas in clinic as schedule. Will review med and seek medical questions if any. (Updated: 2/24/2025)                            Care Plan: General       Problem: HP GENERAL PROBLEM       Goal: General Goal - I would like to find out CADI Waiver status and verify  waiver eligibility/available at this time with [facility] dept by the next 1-3 months.       Start Date: 2/24/2025    This Visit's Progress: 10%    Note:     Measure of Success: NA  Barriers: Patient shared; seeking service/waiver to helps paying for medical appt coverage.   Strengths: Working with [facility] dept, Leno for further assistance and verify CADI Waiver eligibility. Apply if hasn't yet.   Patient expressed understanding of goal: yes    Action steps to achieve this goal  1. I will wait to hear from Leno Pineda, representative with [facility] dept.   2. I will follow up with Leno Pineda with [facility] dept at [phone] if need.   3. I will update status with Care Coordination team at the next outreach follow up.     Note/comment:   -FYI: See CC RN encounnter dated 1/07/2025 for CC SW notes details if need. (CHW; 2/24/2025)                               Action Plans on File:                       Advance Care Plans/Directives:   Advanced Care Plan/Directives on file: No    Discussed with patient/caregiver(s): Declined Further Information               My Medical and Care Information  Problem List   Patient Active Problem List   Diagnosis    Allergies    Vitamin D deficiency    Hyperlipidemia    Major Depression Severe Single Episode    Arnold-Chiari malformation, type II (H)    Obstructive sleep apnea    Syringomyelia (H)    Epilepsy And Recurrent Seizures    Keloid scar of skin    Dysphagia    Recurrent occipital headache     Shunt    Neuropathic pain    Neurogenic bladder    Neurogenic bowel    Controlled substance agreement signed    Angular cheilitis    Cognitive deficits    Sleep disturbances    Hydrocephalus with operating  shunt (H)    Encounter for therapeutic drug monitoring    Diabetes mellitus, type 2 (H)    Morbid obesity (H)    Bleeds easily    Vitamin B12 deficiency (non anemic)    Spasticity    Abnormal LFTs (liver function tests)     Transportation insecurity    Weakness of both lower extremities    Herpes keratitis of left eye    Cauda equina spinal cord injury, sequela      Current Medications:  Please refer to the most recent medication list provided to you by your medical team and reach out to your provider with any questions or to make any corrections.    Care Coordination Start Date: 6/14/2024   Frequency of Care Coordination: monthly, more frequently as needed     Form Last Updated: 03/18/2025

## 2025-03-18 NOTE — PROGRESS NOTES
Clinic Care Coordination Contact  Care Coordination Clinician Chart Review    Situation: Patient chart reviewed by Care Coordinator.       Background: Care Coordination Program started: 6/14/2024. Initial assessment completed and patient-centered care plan(s) were developed with participation from patient. Lead CC handed patient off to CHW for continued outreaches.       Assessment: Per chart review, patient outreach completed by CC CHW on 2.24.  Patient is actively working to accomplish goal(s). Patient's goal(s) appropriate and relevant at this time. Patient is due for updated Plan of Care.  Assessments will be completed annually or as needed/with change of patient status.      Care Plan: General- Seek medication review/education       Problem: HP GENERAL PROBLEM       Goal: General Goal - I would like to seek medication review/education with care teams by the next 1-2 week.       Start Date: 9/26/2024    Recent Progress: 90%    Note:     Measure of Success: NA  Barriers: Take med wrong.   Strengths: Seek medication education/review with CCC RN  and seek further advised.   Patient expressed understanding of goal: yes    Action steps to achieve this goal:  1. I will attend appt on 9/26/2024 at 3:00PM with CCC RN via phone visit to seek med review/education and further advise. (Completed; per pt on 9/30/2024).  2. I will attend appt on 10/01/2024 at 9:00AM with [facility]  via phone visit per [facility] RN suggested to support with medicaid. (Completed)  3. I will attend appt on 11/05/2024 with Dr. Nicolas (PCP) to review ibuprofen and gabapentin med concerns and further advise. (Completed- provider is unavailable per pt on 12/05/2024)  4. I will call PCP office and the neurologist office with any other questions. (Completed: neurologist refer me to the pain clinic to help with pain management per pt on 1/15/2025)  5. I will attend appt on 12/10/2024 with Melissa Nye PA-C to review medication concerns.  (Completed per pt on 1/15/2025)  6. I will continue to follow up with the pain clinic regarding to pain medication management as recommendation. (Updated: 2/24/2025)- continues  7. I will attend appt on 2/13/2025 with Dr. Nicolas in [facility] follow up DM, review due care gaps details/completion, and address any med concerns. (Completed)  8. I will attend appt on 2/28/2025 with MTM dept and seek med concern/questions if any. (Updated: 2/24/2025)  9. I will attend appt on 4/07/2025 and 9/05/2025 at 9:20AM with Dr. Nicolas in clinic as schedule. Will review med and seek medical questions if any. (Updated: 2/24/2025)                            Care Plan: General       Problem: HP GENERAL PROBLEM       Goal: General Goal - I would like to find out CADI Waiver status and verify waiver eligibility/available at this time with [facility] dept by the next 1-3 months.       Start Date: 2/24/2025    This Visit's Progress: 10%    Note:     Measure of Success: NA  Barriers: Patient shared; seeking service/waiver to helps paying for medical appt coverage.   Strengths: Working with [facility] deptLeno for further assistance and verify CADI Waiver eligibility. Apply if hasn't yet.   Patient expressed understanding of goal: yes    Action steps to achieve this goal  1. I will wait to hear from Leno Pineda, representative with [facility] dept.   2. I will follow up with Leno Pineda with [facility] dept at [phone] if need.   3. I will update status with Care Coordination team at the next outreach follow up.     Note/comment:   -FYI: See CC RN encounnter dated 1/07/2025 for CC SW notes details if need. (CHW; 2/24/2025)                                  Plan/Recommendations: The patient will continue working with Care Coordination to achieve goal(s) as above. CHW will continue outreaches at minimum every 30 days and will involve Lead CC as needed or if patient is ready to move to Maintenance. Lead CC  will continue to monitor CHW outreaches and patient's progress to goal(s) every 6 weeks.     Plan of Care updated and sent to patient: Yes, via [employer]

## 2025-03-19 ENCOUNTER — MYC REFILL (OUTPATIENT)
Dept: FAMILY MEDICINE | Facility: CLINIC | Age: 35
End: 2025-03-19
Payer: COMMERCIAL

## 2025-03-19 DIAGNOSIS — R09.81 NASAL CONGESTION: Primary | ICD-10-CM

## 2025-03-19 DIAGNOSIS — E55.9 VITAMIN D DEFICIENCY: ICD-10-CM

## 2025-03-19 DIAGNOSIS — R25.2 SPASTICITY: ICD-10-CM

## 2025-03-19 DIAGNOSIS — R10.13 EPIGASTRIC PAIN: ICD-10-CM

## 2025-03-19 DIAGNOSIS — M79.2 NEUROPATHIC PAIN: ICD-10-CM

## 2025-03-20 RX ORDER — IBUPROFEN 800 MG/1
800 TABLET, FILM COATED ORAL EVERY 8 HOURS PRN
Qty: 100 TABLET | Refills: 0 | Status: SHIPPED | OUTPATIENT
Start: 2025-03-20

## 2025-03-20 RX ORDER — ECHINACEA PURPUREA EXTRACT 125 MG
2 TABLET ORAL DAILY PRN
Qty: 30 ML | Refills: 4 | Status: SHIPPED | OUTPATIENT
Start: 2025-03-20

## 2025-03-20 RX ORDER — BACLOFEN 10 MG/1
10 TABLET ORAL 3 TIMES DAILY
Qty: 100 TABLET | Refills: 11 | Status: SHIPPED | OUTPATIENT
Start: 2025-03-20

## 2025-03-20 RX ORDER — ACETAMINOPHEN 500 MG
1000 TABLET ORAL 3 TIMES DAILY
Qty: 200 TABLET | Refills: 0 | Status: SHIPPED | OUTPATIENT
Start: 2025-03-20

## 2025-03-20 RX ORDER — CHOLECALCIFEROL (VITAMIN D3) 50 MCG
1 TABLET ORAL DAILY
Qty: 100 TABLET | Refills: 4 | OUTPATIENT
Start: 2025-03-20

## 2025-03-20 RX ORDER — OMEPRAZOLE 20 MG/1
20 CAPSULE, DELAYED RELEASE ORAL DAILY
Qty: 90 CAPSULE | Refills: 3 | OUTPATIENT
Start: 2025-03-20

## 2025-03-20 NOTE — TELEPHONE ENCOUNTER
Future Appointments   Date Time Provider Department Center   4/1/2025  1:30 PM Gabriela Alfonso, [facility]   4/7/2025  9:40 AM Lary Nicolas MD [facility]   9/5/2025  9:20 AM Lary Nicolas MD [facility]      Health Maintenance Due   Topic Date Due    MEDICARE ANNUAL WELLNESS VISIT  07/17/2021    URINE DRUG SCREEN  04/07/2024    A1C  09/14/2024    DEPRESSION 6 MO INDEX REPEAT PHQ-9  12/31/2024    LIPID  03/27/2025    MICROALBUMIN  03/27/2025    DIABETIC FOOT EXAM  03/27/2025    ANNUAL REVIEW OF HM ORDERS  03/27/2025     BP Readings from Last 3 Encounters:   11/18/24 116/78   11/11/24 126/82   08/29/24 125/89     Pacheco was seen today for refill request.    Diagnoses and all orders for this visit:    Nasal congestion  -     sodium chloride 0.65 % nasal spray; Spray 2 sprays in nostril daily as needed for congestion.    Vitamin D deficiency    Spasticity  -     baclofen (LIORESAL) 10 MG tablet; Take 1 tablet (10 mg) by mouth 3 times daily.    Neuropathic pain  -     ibuprofen (ADVIL/MOTRIN) 800 MG tablet; Take 1 tablet (800 mg) by mouth every 8 hours as needed for moderate pain. Take with food.

## 2025-03-24 ENCOUNTER — PATIENT OUTREACH (OUTPATIENT)
Dept: CARE COORDINATION | Facility: CLINIC | Age: 35
End: 2025-03-24
Payer: COMMERCIAL

## 2025-03-24 ENCOUNTER — MYC REFILL (OUTPATIENT)
Dept: FAMILY MEDICINE | Facility: CLINIC | Age: 35
End: 2025-03-24
Payer: COMMERCIAL

## 2025-03-24 DIAGNOSIS — M79.2 NEUROPATHIC PAIN: ICD-10-CM

## 2025-03-24 NOTE — PROGRESS NOTES
Clinic Care Coordination Contact:  [facility]/Voicemail    Today's date: 3/24/2025    Reason: [facility] CHW Follow Up Call (follow up current goal's)    Clinical Data: Care Coordinator Outreach:    Outreach Documentation Number of Outreach Attempt   3/24/2025  10:47 AM 1     Patient Outreach:  Attempted #1: CHW attempted to connect with patient and no answer. Left message on patient's voicemail with call back information and requested return call.    Plan:   Attempt #2: Care Coordinator team will try to reach patient again in 2-3 weeks.

## 2025-03-25 RX ORDER — GABAPENTIN 600 MG/1
TABLET ORAL
Qty: 210 TABLET | Refills: 11 | Status: SHIPPED | OUTPATIENT
Start: 2025-03-25

## 2025-03-27 DIAGNOSIS — B86 SCABIES: Primary | ICD-10-CM

## 2025-03-27 RX ORDER — IVERMECTIN 3 MG/1
18 TABLET ORAL WEEKLY
Qty: 12 TABLET | Refills: 0 | Status: SHIPPED | OUTPATIENT
Start: 2025-03-27 | End: 2025-04-04

## 2025-03-27 NOTE — PROGRESS NOTES
Multiple family members, including Pacheco, have symptoms of scabies (itchy red bumps on hands and feet, including web spaces).     Diagnoses and all orders for this visit:    Scabies  -     ivermectin (STROMECTOL) 3 MG TABS tablet; Take 6 tablets (18 mg) by mouth once a week for 2 doses.

## 2025-03-30 ENCOUNTER — MYC REFILL (OUTPATIENT)
Dept: FAMILY MEDICINE | Facility: CLINIC | Age: 35
End: 2025-03-30
Payer: COMMERCIAL

## 2025-03-30 DIAGNOSIS — M79.2 NEUROPATHIC PAIN: ICD-10-CM

## 2025-03-31 RX ORDER — ACETAMINOPHEN 500 MG
1000 TABLET ORAL 3 TIMES DAILY
Qty: 200 TABLET | Refills: 5 | Status: SHIPPED | OUTPATIENT
Start: 2025-03-31

## 2025-04-06 ASSESSMENT — ANXIETY QUESTIONNAIRES
1. FEELING NERVOUS, ANXIOUS, OR ON EDGE: SEVERAL DAYS
8. IF YOU CHECKED OFF ANY PROBLEMS, HOW DIFFICULT HAVE THESE MADE IT FOR YOU TO DO YOUR WORK, TAKE CARE OF THINGS AT HOME, OR GET ALONG WITH OTHER PEOPLE?: VERY DIFFICULT
GAD7 TOTAL SCORE: 9
2. NOT BEING ABLE TO STOP OR CONTROL WORRYING: SEVERAL DAYS
4. TROUBLE RELAXING: SEVERAL DAYS
5. BEING SO RESTLESS THAT IT IS HARD TO SIT STILL: SEVERAL DAYS
6. BECOMING EASILY ANNOYED OR IRRITABLE: SEVERAL DAYS
7. FEELING AFRAID AS IF SOMETHING AWFUL MIGHT HAPPEN: MORE THAN HALF THE DAYS
7. FEELING AFRAID AS IF SOMETHING AWFUL MIGHT HAPPEN: MORE THAN HALF THE DAYS
IF YOU CHECKED OFF ANY PROBLEMS ON THIS QUESTIONNAIRE, HOW DIFFICULT HAVE THESE PROBLEMS MADE IT FOR YOU TO DO YOUR WORK, TAKE CARE OF THINGS AT HOME, OR GET ALONG WITH OTHER PEOPLE: VERY DIFFICULT
GAD7 TOTAL SCORE: 9
3. WORRYING TOO MUCH ABOUT DIFFERENT THINGS: MORE THAN HALF THE DAYS
GAD7 TOTAL SCORE: 9

## 2025-04-06 ASSESSMENT — PATIENT HEALTH QUESTIONNAIRE - PHQ9
SUM OF ALL RESPONSES TO PHQ QUESTIONS 1-9: 13
10. IF YOU CHECKED OFF ANY PROBLEMS, HOW DIFFICULT HAVE THESE PROBLEMS MADE IT FOR YOU TO DO YOUR WORK, TAKE CARE OF THINGS AT HOME, OR GET ALONG WITH OTHER PEOPLE: VERY DIFFICULT
SUM OF ALL RESPONSES TO PHQ QUESTIONS 1-9: 13

## 2025-04-07 ENCOUNTER — OFFICE VISIT (OUTPATIENT)
Dept: FAMILY MEDICINE | Facility: CLINIC | Age: 35
End: 2025-04-07
Payer: COMMERCIAL

## 2025-04-07 ENCOUNTER — PATIENT OUTREACH (OUTPATIENT)
Dept: CARE COORDINATION | Facility: CLINIC | Age: 35
End: 2025-04-07

## 2025-04-07 VITALS
SYSTOLIC BLOOD PRESSURE: 112 MMHG | HEIGHT: 66 IN | RESPIRATION RATE: 16 BRPM | HEART RATE: 110 BPM | BODY MASS INDEX: 35.03 KG/M2 | WEIGHT: 218 LBS | TEMPERATURE: 97.2 F | DIASTOLIC BLOOD PRESSURE: 68 MMHG | OXYGEN SATURATION: 98 %

## 2025-04-07 DIAGNOSIS — E11.9 TYPE 2 DIABETES MELLITUS WITHOUT COMPLICATION, WITH LONG-TERM CURRENT USE OF INSULIN (H): Primary | ICD-10-CM

## 2025-04-07 DIAGNOSIS — Z51.81 ENCOUNTER FOR THERAPEUTIC DRUG MONITORING: ICD-10-CM

## 2025-04-07 DIAGNOSIS — G91.9 HYDROCEPHALUS, UNSPECIFIED TYPE (H): ICD-10-CM

## 2025-04-07 DIAGNOSIS — E53.8 VITAMIN B12 DEFICIENCY (NON ANEMIC): ICD-10-CM

## 2025-04-07 DIAGNOSIS — B00.52 HERPES KERATITIS OF LEFT EYE: ICD-10-CM

## 2025-04-07 DIAGNOSIS — G40.909 NONINTRACTABLE EPILEPSY WITHOUT STATUS EPILEPTICUS, UNSPECIFIED EPILEPSY TYPE (H): ICD-10-CM

## 2025-04-07 DIAGNOSIS — K13.0 ANGULAR CHEILITIS: ICD-10-CM

## 2025-04-07 DIAGNOSIS — Z79.4 TYPE 2 DIABETES MELLITUS WITHOUT COMPLICATION, WITH LONG-TERM CURRENT USE OF INSULIN (H): Primary | ICD-10-CM

## 2025-04-07 DIAGNOSIS — L29.9 ITCHING OF EAR: ICD-10-CM

## 2025-04-07 DIAGNOSIS — M79.2 NEUROPATHIC PAIN: ICD-10-CM

## 2025-04-07 PROBLEM — T85.618A SHUNT MALFUNCTION: Status: ACTIVE | Noted: 2024-10-12

## 2025-04-07 PROBLEM — T85.618A SHUNT MALFUNCTION: Status: RESOLVED | Noted: 2024-10-12 | Resolved: 2025-04-07

## 2025-04-07 LAB
CHOLEST SERPL-MCNC: 131 MG/DL
EST. AVERAGE GLUCOSE BLD GHB EST-MCNC: 126 MG/DL
FASTING STATUS PATIENT QL REPORTED: YES
HBA1C MFR BLD: 6 % (ref 0–5.6)
HDLC SERPL-MCNC: 41 MG/DL
LDLC SERPL CALC-MCNC: 44 MG/DL
NONHDLC SERPL-MCNC: 90 MG/DL
TRIGL SERPL-MCNC: 228 MG/DL
VIT B12 SERPL-MCNC: 240 PG/ML (ref 232–1245)

## 2025-04-07 PROCEDURE — 3074F SYST BP LT 130 MM HG: CPT | Performed by: FAMILY MEDICINE

## 2025-04-07 PROCEDURE — 87186 SC STD MICRODIL/AGAR DIL: CPT | Performed by: FAMILY MEDICINE

## 2025-04-07 PROCEDURE — 80061 LIPID PANEL: CPT | Performed by: FAMILY MEDICINE

## 2025-04-07 PROCEDURE — 87070 CULTURE OTHR SPECIMN AEROBIC: CPT | Performed by: FAMILY MEDICINE

## 2025-04-07 PROCEDURE — 83921 ORGANIC ACID SINGLE QUANT: CPT | Performed by: FAMILY MEDICINE

## 2025-04-07 PROCEDURE — 3078F DIAST BP <80 MM HG: CPT | Performed by: FAMILY MEDICINE

## 2025-04-07 PROCEDURE — 82607 VITAMIN B-12: CPT | Performed by: FAMILY MEDICINE

## 2025-04-07 PROCEDURE — 99214 OFFICE O/P EST MOD 30 MIN: CPT | Performed by: FAMILY MEDICINE

## 2025-04-07 PROCEDURE — G2211 COMPLEX E/M VISIT ADD ON: HCPCS | Performed by: FAMILY MEDICINE

## 2025-04-07 PROCEDURE — 87077 CULTURE AEROBIC IDENTIFY: CPT | Performed by: FAMILY MEDICINE

## 2025-04-07 PROCEDURE — 36415 COLL VENOUS BLD VENIPUNCTURE: CPT | Performed by: FAMILY MEDICINE

## 2025-04-07 PROCEDURE — 83036 HEMOGLOBIN GLYCOSYLATED A1C: CPT | Performed by: FAMILY MEDICINE

## 2025-04-07 PROCEDURE — 87101 SKIN FUNGI CULTURE: CPT | Performed by: FAMILY MEDICINE

## 2025-04-07 RX ORDER — LAMOTRIGINE 100 MG/1
100 TABLET ORAL 2 TIMES DAILY
COMMUNITY
Start: 2025-04-07

## 2025-04-07 RX ORDER — DIAPER,BRIEF,INFANT-TODD,DISP
EACH MISCELLANEOUS 2 TIMES DAILY PRN
Qty: 30 G | Refills: 0 | Status: SHIPPED | OUTPATIENT
Start: 2025-04-07

## 2025-04-07 RX ORDER — LANOLIN ALCOHOL/MO/W.PET/CERES
1000 CREAM (GRAM) TOPICAL DAILY
Qty: 90 TABLET | Refills: 4 | Status: SHIPPED | OUTPATIENT
Start: 2025-04-07

## 2025-04-07 RX ORDER — FLASH GLUCOSE SCANNING READER
EACH MISCELLANEOUS
Qty: 1 EACH | Refills: 0 | Status: SHIPPED | OUTPATIENT
Start: 2025-04-07

## 2025-04-07 RX ORDER — FLASH GLUCOSE SENSOR
KIT MISCELLANEOUS
Qty: 6 EACH | Refills: 5 | Status: SHIPPED | OUTPATIENT
Start: 2025-04-07

## 2025-04-07 RX ORDER — LAMOTRIGINE 25 MG/1
25 TABLET ORAL 2 TIMES DAILY
COMMUNITY

## 2025-04-07 NOTE — PROGRESS NOTES
Office Visit  [facility]  Lary Nicolas M.D. Family Medicine  Date of Service: Apr 7, 2025      Subjective   Pacheco David is a 34 year old male who presents for   Chief Complaint   Patient presents with    Diabetes     Diabetes  Not checking blood sugars because loss of sensation in fingers makes it difficult for him to do the testing.  He would like a continuous glucometer to reduce fear about that he has to use his hands to check his blood sugar.    Chronic neuropathic pain  The patient has been working with neurology, neurosurgery, and pain clinic.  He had a  shunt replaced.  But underlying condition continues to cause significant symptoms.  He has pain across his body and out about this from his chest down.  He is getting injured due to his inability to feel his environment.  He is interested in getting a second opinion from neurology and neurosurgery.    Vitamin B12 deficiency.  He would like to try using vitamin B12 pills because he is not able to manipulate the syringes to give himself the vitamin B12 shots.  Current Outpatient Medications   Medication Instructions    ACCU-CHEK GUIDE test strip Use to test blood sugar once daily.    acetaminophen (TYLENOL) 1,000 mg, Oral, 3 TIMES DAILY, With gabapentin    baclofen (LIORESAL) 10 mg, Oral, 3 TIMES DAILY    blood glucose (ACCU-CHEK SOFTCLIX) lancing device Lancing device to be used with lancets.    blood glucose monitoring (NO BRAND SPECIFIED) meter device kit Use to test blood sugar 1 times daily or as directed. Preferred blood glucose meter OR supplies to accompany: Blood Glucose Monitor Brands: per insurance.    blood glucose monitoring (SOFTCLIX) lancets Use to test blood sugar once daily.    cyanocobalamin (CYANOCOBALAMIN) 1,000 mcg, Intramuscular, WEEKLY    DULoxetine (CYMBALTA) 60 mg, 2 TIMES DAILY    eszopiclone (LUNESTA) 2 mg, AT BEDTIME    fluticasone (FLONASE) 50 MCG/ACT nasal spray 2 sprays, Both Nostrils, 2 TIMES DAILY     "gabapentin (NEURONTIN) 600 MG tablet Take 2 tablets (1,200 mg) by mouth every morning AND 2 tablets (1,200 mg) daily at 2 pm AND 3 tablets (1,800 mg) at bedtime. Take three tablet at bedtime..    ibuprofen (ADVIL/MOTRIN) 800 mg, Oral, EVERY 8 HOURS PRN, Take with food.    lamoTRIgine (LAMICTAL) 100 mg, Oral, 2 TIMES DAILY    lidocaine (LIDODERM) 5 % patch Transdermal, EVERY 24 HOURS, To prevent lidocaine toxicity, patient should be patch free for 12 hrs daily.    loratadine (ALLERGY RELIEF) 10 MG tablet TAKE 1 PILL BY MOUTH EVERY DAY/ NOJ IB LUB IB HNUB PAB ALLERGY    omeprazole (PRILOSEC) 20 mg, Oral, DAILY    phentermine (ADIPEX-P) 37.5 mg, Oral, EVERY MORNING BEFORE BREAKFAST    polyethylene glycol (MIRALAX) 17 g, Oral, DAILY, As needed for constipation    rosuvastatin (CRESTOR) 20 mg, Oral, DAILY, TAKE 1 PILL BY MOUTH EVERY DAY AT BEDTIME/TXHUA HMO THAUM MUS PW NOJ 1 LUB. PAB ZOO NTSHAV MUAJ ROJ    SENNA-docusate sodium (SENNA S) 8.6-50 MG tablet 1 tablet, Oral, AT BEDTIME, Hold if diarrhea    sodium chloride 0.65 % nasal spray 2 sprays, Nasal, DAILY PRN    Syringe/Needle, Disp, 23G X 1\" 1 ML MISC 1 each, Does not apply, WEEKLY    Syringe/Needle, Disp, 25G X 1-1/2\" 1 ML MISC 1 each, Does not apply, WEEKLY, Use to inject vitamin B12 1000 mcg IM.    Tirzepatide (MOUNJARO) 7.5 mg, Subcutaneous, WEEKLY    topiramate (TOPAMAX) 50 mg, 2 TIMES DAILY    traZODone (DESYREL) 100 mg, Oral, AT BEDTIME    Turmeric 5-1000 MG CAPS 1 Capful, Oral, 2 TIMES DAILY    vitamin D3 (CHOLECALCIFEROL) 50 mcg, Oral, DAILY      Objective   /68   Pulse 110   Temp 97.2  F (36.2  C) (Temporal)   Resp 16   Ht 1.676 m (5' 6\")   Wt 98.9 kg (218 lb)   SpO2 98%   BMI 35.19 kg/m   He reports that he has never smoked. He has never been exposed to tobacco smoke. He has never used smokeless tobacco.  Wt Readings from Last 6 Encounters:   04/07/25 98.9 kg (218 lb)   11/11/24 98.9 kg (218 lb)   04/16/24 124.7 kg (275 lb)   03/27/24 124.7 " kg (275 lb)   03/15/24 124.7 kg (275 lb)   03/03/24 122.5 kg (270 lb)       Gen: Alert, no apparent distress.    Results for orders placed or performed in visit on 04/07/25   HEMOGLOBIN A1C     Status: Abnormal   Result Value Ref Range    Estimated Average Glucose 126 (H) <117 mg/dL    Hemoglobin A1C 6.0 (H) 0.0 - 5.6 %   [id number] - Urine Drug Confirmation Panel (Comprehensive)     Status: None ()    Narrative    The following orders were created for panel order [id number] - Urine Drug Confirmation Panel (Comprehensive).  Procedure                               Abnormality         Status                     ---------                               -----------         ------                     Urine Drug Confirmation...[[id number]]                                                 Urine Creatinine for DrLiza..[[id number]]                                                 Cannabinoids qualitativ...[[id number]]                                                   Please view results for these tests on the individual orders.     Assessment & Plan     Type 2 diabetes, well-controlled with lifestyle management.  Sensory loss makes him unable to manipulate the testing equipment.  He would like to use a continuous glucometer as it will be easier to manipulate with his sensation loss.  Chronic neuropathic pain.  Following with neurology, neurosurgery, and pain care.  Vitamin B12 deficiency.  Not able to self inject vitamin B12.  Switch to vitamin B12 pills and monitor vitamin B12 level.  Depression.  Due to chronic illness and chronic pain.  He is seeing a psychiatrist and therapist.      Order Summary                                                      Type 2 diabetes mellitus without complication, with long-term current use of insulin (H)  -     Lipid panel reflex to direct LDL Non-fasting; Future  -     Albumin Random Urine Quantitative with Creat Ratio; Future  -     Hemoglobin A1c; Future  -     Continuous Glucose   (FREESTYLE ANNA 14 DAY READER) YOANA; Use to read blood sugars as per 's instructions.  -     Continuous Glucose Sensor (FREESTYLE ANNA 14 DAY SENSOR) MISC; Change every 14 days.  -     Lipid panel reflex to direct LDL Non-fasting    Vitamin B12 deficiency (non anemic)  -     Vitamin B12; Future  -     Methylmalonic Acid; Future  -     cyanocobalamin (VITAMIN B-12) 1000 MCG tablet; Take 1 tablet (1,000 mcg) by mouth daily.  -     Vitamin B12  -     Methylmalonic Acid    Herpes keratitis of left eye  -     Adult Eye  Referral; Future    Neuropathic pain  -     lamoTRIgine (LAMICTAL) 100 MG tablet; Take 1 tablet (100 mg) by mouth 2 times daily. Take with 25 mg tablet to equal 125 mg twice daily.    Itching of ear  -     hydrocortisone (CORTAID) 1 % external ointment; Apply topically 2 times daily as needed for itching (left ear canal).    Encounter for therapeutic drug monitoring  -     [id number] - Urine Drug Confirmation Panel (Comprehensive)    Angular cheilitis  -     Fungus Culture,  skin, hair, or nail  -     Swab Aerobic Bacterial Culture Routine Without Gram Stain; Future  -     Swab Aerobic Bacterial Culture Routine Without Gram Stain    Hydrocephalus, unspecified type (H)    Nonintractable epilepsy without status epilepticus, unspecified epilepsy type (H)    Body mass index (BMI) 40.0-44.9, adult (H)    Other orders  -     REVIEW OF HEALTH MAINTENANCE PROTOCOL ORDERS  -     lamoTRIgine (LAMICTAL) 25 MG tablet; Take 25 mg by mouth 2 times daily. Take with 100 mg tablet to equal 125 mg twice daily.  -     HEMOGLOBIN A1C            Future Appointments   Date Time Provider Department Center   4/7/2025  9:40 AM Lary Nicolas MD [facility]   5/14/2025  1:00 PM Gabriela Alfonso, [facility]   9/5/2025  9:20 AM Lary Nicolas MD [facility]       Completed by: Lary Nicolas M.D., [facility]. 4/7/2025 9:32 AM. The longitudinal plan of care for the  diagnosis(es)/condition(s) as documented were addressed during this visit. Due to the added complexity in care, I will continue to support Pacheco in the subsequent management and with ongoing continuity of care.   This transcription uses voice recognition software, which may contain typographical errors.  MDM: GRICELDA neighborhood: [facility] [id number], language: English. .    History of Present Illness       Mental Health Follow-up:  Patient presents to follow-up on Depression & Anxiety.Patient's depression since last visit has been:  Medium  The patient is not having other symptoms associated with depression.  Patient's anxiety since last visit has been:  Worse  The patient is not having other symptoms associated with anxiety.  Any significant life events: No  Patient is not feeling anxious or having panic attacks.  Patient has no concerns about alcohol or drug use.    Diabetes:   He presents for follow up of diabetes.    He is not checking blood glucose.         He has no concerns regarding his diabetes at this time.   He is not experiencing numbness or burning in feet, excessive thirst, blurry vision, weight changes or redness, sores or blisters on feet.           Reason for visit:  Follow up    He eats 2-3 servings of fruits and vegetables daily.He consumes 4 sweetened beverage(s) daily.He exercises with enough effort to increase his heart rate 9 or less minutes per day.  He exercises with enough effort to increase his heart rate 3 or less days per week.   He is taking medications regularly.

## 2025-04-07 NOTE — PROGRESS NOTES
Clinic Care Coordination Contact:  Community Health Worker Follow Up    Today's date: Monday, 4/7/2025    Reason: [facility] CHW Follow Up Call (follow up current goal's)    Care Gaps:   Health Maintenance Due   Topic Date Due    MEDICARE ANNUAL WELLNESS VISIT  07/17/2021    URINE DRUG SCREEN  04/07/2024    LIPID  03/27/2025    MICROALBUMIN  03/27/2025    DIABETIC FOOT EXAM  03/27/2025     Suggested pt to connect with PCP office if any questions and continues to follow up with PCP as instructions. Pt confirmed.    Care Plan:   Care Plan: General- Seek medication review/education       Problem: HP GENERAL PROBLEM       Goal: General Goal - I would like to seek medication review/education with care teams by the next 1-2 week.       Start Date: 9/26/2024    This Visit's Progress: 90% Recent Progress: 90%    Note:     Measure of Success: NA  Barriers: Take med wrong.   Strengths: Seek medication education/review with CCC RN  and seek further advised.   Patient expressed understanding of goal: yes    Action steps to achieve this goal:  1. I will attend appt on 9/26/2024 at 3:00PM with CCC RN via phone visit to seek med review/education and further advise. (Completed; per pt on 9/30/2024).  2. I will attend appt on 10/01/2024 at 9:00AM with [facility]  via phone visit per [facility] RN suggested to support with medicaid. (Completed)  3. I will attend appt on 11/05/2024 with Dr. Nicolas (PCP) to review ibuprofen and gabapentin med concerns and further advise. (Completed- provider is unavailable per pt on 12/05/2024)  4. I will call PCP office and the neurologist office with any other questions. (Completed: neurologist refer me to the pain clinic to help with pain management per pt on 1/15/2025)  5. I will attend appt on 12/10/2024 with Melissa Nye PA-C to review medication concerns. (Completed per pt on 1/15/2025)  6. I will continue to follow up with the pain clinic regarding to pain medication management as  recommendation. (Updated: 2/24/2025)- continues  7. I will attend appt on 2/13/2025 with Dr. Nicolas in [facility] follow up DM, review due care gaps details/completion, and address any med concerns. (Completed)  8. I will attend appt on 2/28/2025 with [facility] dept and seek med concern/questions if any. (Updated: 2/24/2025)  9. I will attend appt on 4/07/2025 and 9/05/2025 at 9:20AM with Dr. Nicolas in clinic as schedule. Will review med and seek medical questions if any. (Updated: 2/24/2025)  10. I will connect with PCP/Dr. Nicolas office at [phone] if any questions. (Updated: 4/07/2025)                            Care Plan: General       Problem: HP GENERAL PROBLEM       Goal: General Goal - I would like to find out CADI Waiver status and verify waiver eligibility/available at this time with [facility] dept by the next 1-3 months.       Start Date: 2/24/2025    This Visit's Progress: 20% Recent Progress: 10%    Note:     Measure of Success: NA  Barriers: Patient shared; seeking service/waiver to helps paying for medical appt coverage.   Strengths: Working with [facility] dept[facility] for further assistance and verify CADI Waiver eligibility. Apply if hasn't yet.   Patient expressed understanding of goal: yes    Action steps to achieve this goal:  1. I will wait to hear from Leno Pineda, representative with [facility] dept. (Updated: 4/07/2025)- continues  2. I will follow up with Leno Pineda with [facility] dept at [phone] if need. (Updated: 4/07/2025)- continues  3. I will update status with Care Coordination team at the next outreach follow up. (Updated: 4/07/2025)- continues    Note/comment:   -FYI: See CC RN encounnter dated 1/07/2025 for CC SW notes details if need. (CHW; 2/24/2025)   -CHW and pt conference call to Leno Pineda and reach voicemail. Left a voice message on 4/07/25 request Leno to return call to patient directly for update status of CADI Waiver. (CHW; 4/07/2025).     "                         Patient Outreach Discussion:   CC CHW was able to connect with patient today regard to reason(s) above. Check in how patient is doing and any questions or concerns at this time. Patient shared info below. Pt shared unable to reach [facility] Waiver dept with the [facility] for months. Pt agreed to CHW support with conference call to find out status.     Patient shared:  -Process of applying PCA service. My health care insurance will not cover for service. [facility]. They hasn't connect with me for months. KERA Lopez hasn't returning any of my called/voice messages or e-mail communication. My sister said that she hasn't see any respond  message via e-mail either.   -PCA intake dept call and spoke with me and my family last week. I told the caller that my insurance won't cover for PCA service and we're having a hard time to connect with the waiver team by e-mail or phone. Plan: I need to work with CADARNOLD John for the next step. PCA intake dept will connect with me again.   -\"Started to have speech problem and memory loss.\" Saw a neurologist with [facility] and it's out of the scope of this doctor practice and that I should see someone else. Spoke with Dr. Nicolas about this \"just today\"  and that I should see a Neurologist and neurosurgery with [place] and they have MRI at one place for the next step. Will schedule if [facility] call.   -Next CHW team outreach plan below: I sleep in the morning. Please call me in the afternoon.  -Doing fine. No other questions.     Coordinate Care:   11:03AM: Pt and CHW conference call to Leno Pineda with [facility] dept at [phone] and no answer. Left a voice message including pt full name, , pt phone number and reason of call (follow up CADI Waiver status and update waiver dept that pca intake dept connected with pt last week) per pt request and verbal permission. Request Leno to return patient call. RSC/pt pcp " main office phone number provided if any medical questions. Pt confirmed no other questions in voice message left for Leno Pineda.      CHW suggestions for patient today:  -Pt to follow Dr. Nicolas instructions and schedule appt with [facility] as needed. Patient may connect with Dr. Nicolas office at [phone] and [facility] if any questions.   -Patient may connect CCC/CHW with any additional resource needs.  -Pt wait to hear from Leno Pineda with [facility] regard to CADI Waiver status. May update status with CC team. Refer Leno Pineda to connect with PCP office if any medical records info requires.     CHW Next Follow-up: Goal(s) follow up. Informed patient of due care gaps (if any).     Outreach frequency: monthly      CHW Plan:   -See CHW suggestions above.   -Next CHW outreach plan: 1 month to monitor the progression of their goal(s). Call patient in the afternoon per pt request 4/07/2025.

## 2025-04-09 ENCOUNTER — TELEPHONE (OUTPATIENT)
Dept: FAMILY MEDICINE | Facility: CLINIC | Age: 35
End: 2025-04-09
Payer: COMMERCIAL

## 2025-04-09 DIAGNOSIS — K13.0 ANGULAR CHEILITIS: ICD-10-CM

## 2025-04-09 LAB — METHYLMALONATE SERPL-SCNC: 0.37 UMOL/L (ref 0–0.4)

## 2025-04-09 RX ORDER — MUPIROCIN 20 MG/G
OINTMENT TOPICAL 3 TIMES DAILY
Qty: 15 G | Refills: 0 | Status: SHIPPED | OUTPATIENT
Start: 2025-04-09

## 2025-04-09 RX ORDER — MUPIROCIN 20 MG/G
OINTMENT TOPICAL 3 TIMES DAILY
Qty: 15 G | Refills: 0 | Status: SHIPPED | OUTPATIENT
Start: 2025-04-09 | End: 2025-04-09

## 2025-04-09 NOTE — RESULT ENCOUNTER NOTE
Please tell Pacheco that the sores on the corner of his mouth are growing a bacterial infection called Staphylococcus aureus.  I will send in mupirocin ointment.  I want him to use it 3 times a day on both corners of the mouth for 10 days.

## 2025-04-09 NOTE — TELEPHONE ENCOUNTER
----- Message from Lary Nicolas sent at 4/9/2025 11:23 AM CDT -----  Please tell Pacheco that the sores on the corner of his mouth are growing a bacterial infection called Staphylococcus aureus.  I will send in mupirocin ointment.  I want him to use it 3 times a day on both corners of the mouth for 10 days.

## 2025-04-09 NOTE — TELEPHONE ENCOUNTER
Called patient, relayed message.  Patient verbalizes understanding and agreement.  Patient asked writer to send medication to alternate pharmacy - done.  Writer advised patient to follow-up if not improving after 10 days, or if any concerns develop before then.    Deepti Abernathy RN  [facility]

## 2025-04-10 PROBLEM — K13.0 ANGULAR CHEILITIS DUE TO BACTERIAL INFECTION: Status: ACTIVE | Noted: 2020-07-17

## 2025-04-10 PROBLEM — B96.89 ANGULAR CHEILITIS DUE TO BACTERIAL INFECTION: Status: ACTIVE | Noted: 2020-07-17

## 2025-04-10 LAB
BACTERIA SKIN AEROBE CULT: ABNORMAL
BACTERIA SKIN AEROBE CULT: NORMAL

## 2025-04-14 DIAGNOSIS — J30.2 SEASONAL ALLERGIC RHINITIS, UNSPECIFIED TRIGGER: Primary | ICD-10-CM

## 2025-04-14 RX ORDER — LORATADINE 10 MG/1
TABLET ORAL
Qty: 90 TABLET | Refills: 2 | Status: SHIPPED | OUTPATIENT
Start: 2025-04-14

## 2025-04-14 NOTE — TELEPHONE ENCOUNTER
Current Outpatient Medications   Medication Instructions    ACCU-CHEK GUIDE test strip Use to test blood sugar once daily.    acetaminophen (TYLENOL) 1,000 mg, Oral, 3 TIMES DAILY, With gabapentin    baclofen (LIORESAL) 10 mg, Oral, 3 TIMES DAILY    blood glucose (ACCU-CHEK SOFTCLIX) lancing device Lancing device to be used with lancets.    blood glucose monitoring (NO BRAND SPECIFIED) meter device kit Use to test blood sugar 1 times daily or as directed. Preferred blood glucose meter OR supplies to accompany: Blood Glucose Monitor Brands: per insurance.    blood glucose monitoring (SOFTCLIX) lancets Use to test blood sugar once daily.    Continuous Glucose  (FREESTYLE ANNA 14 DAY READER) YOANA Use to read blood sugars as per 's instructions.    Continuous Glucose Sensor (FREESTYLE ANNA 14 DAY SENSOR) MISC Change every 14 days.    cyanocobalamin (VITAMIN B-12) 1,000 mcg, Oral, DAILY    DULoxetine (CYMBALTA) 60 mg, Oral, 2 TIMES DAILY    fluticasone (FLONASE) 50 MCG/ACT nasal spray 2 sprays, Both Nostrils, 2 TIMES DAILY    gabapentin (NEURONTIN) 600 MG tablet Take 2 tablets (1,200 mg) by mouth every morning AND 2 tablets (1,200 mg) daily at 2 pm AND 3 tablets (1,800 mg) at bedtime. Take three tablet at bedtime..    hydrocortisone (CORTAID) 1 % external ointment Topical, 2 TIMES DAILY PRN    ibuprofen (ADVIL/MOTRIN) 800 mg, Oral, EVERY 8 HOURS PRN, Take with food.    lamoTRIgine (LAMICTAL) 25 mg, Oral, 2 TIMES DAILY, Take with 100 mg tablet to equal 125 mg twice daily.    lamoTRIgine (LAMICTAL) 100 mg, Oral, 2 TIMES DAILY, Take with 25 mg tablet to equal 125 mg twice daily.    lidocaine (LIDODERM) 5 % patch Transdermal, EVERY 24 HOURS, To prevent lidocaine toxicity, patient should be patch free for 12 hrs daily.    loratadine (ALLERGY RELIEF) 10 MG tablet TAKE 1 PILL BY MOUTH EVERY DAY/ NOJ IB LUB IB HNUB PAB ALLERGY    mupirocin (BACTROBAN) 2 % external ointment Topical, 3 TIMES DAILY, Apply to  both corners of the mouth for Staph aureus skin infection.    omeprazole (PRILOSEC) 20 mg, Oral, DAILY    polyethylene glycol (MIRALAX) 17 g, Oral, DAILY, As needed for constipation    rosuvastatin (CRESTOR) 20 mg, Oral, DAILY, TAKE 1 PILL BY MOUTH EVERY DAY AT BEDTIME/TXHUA O THAUM MUS PW NOJ 1 LUB. PAB ZOO NTSHAV MUAJ ROJ    SENNA-docusate sodium (SENNA S) 8.6-50 MG tablet 1 tablet, Oral, AT BEDTIME, Hold if diarrhea    sodium chloride 0.65 % nasal spray 2 sprays, Nasal, DAILY PRN    Tirzepatide (MOUNJARO) 7.5 mg, Subcutaneous, WEEKLY    topiramate (TOPAMAX) 50 mg, 2 TIMES DAILY    traZODone (DESYREL) 100 mg, Oral, AT BEDTIME    Turmeric 5-1000 MG CAPS 1 Capful, Oral, 2 TIMES DAILY    vitamin D3 (CHOLECALCIFEROL) 50 mcg, Oral, DAILY

## 2025-04-16 ENCOUNTER — PATIENT OUTREACH (OUTPATIENT)
Dept: CARE COORDINATION | Facility: CLINIC | Age: 35
End: 2025-04-16
Payer: COMMERCIAL

## 2025-04-16 ENCOUNTER — TELEPHONE (OUTPATIENT)
Dept: FAMILY MEDICINE | Facility: CLINIC | Age: 35
End: 2025-04-16
Payer: COMMERCIAL

## 2025-04-16 NOTE — TELEPHONE ENCOUNTER
General Call    Contacts       Contact Date/Time Type Contact Phone/Fax    04/16/2025 10:00 AM CDT Phone (Incoming) Pacheco David (Self) [phone] (M)          Reason for Call:  Patient called to give an update on his mouth condition.     What are your questions or concerns:  corners of mouth. Right side is worse compared to the left. There was a cut on the right corner, after applying for 10 days, getting white like puss around it whenever he applies the ointment. In general it seems to be getter better but the cut is still there. It stopped bleeding.     Patient would like a call back with any questions. [phone]    Patient is also checking up on having a nurse come out to his house to help him with his medications. Would like to know where PCP is with that.     Date of last appointment with provider: 4/7/25    Could we send this information to you in [place] or would you prefer to receive a phone call?:   Patient would prefer a phone call   Okay to leave a detailed message?: Yes at Cell number on file:    Telephone Information:   Mobile [phone]   Mobile Not on file.

## 2025-04-16 NOTE — PROGRESS NOTES
Clinic Care Coordination Contact:  Community Health Worker Follow Up    Today's date: 4/16/2025    Reason: returning call    Care Gaps:   Health Maintenance Due   Topic Date Due    MEDICARE ANNUAL WELLNESS VISIT  07/17/2021    URINE DRUG SCREEN  04/07/2024    MICROALBUMIN  03/27/2025    DIABETIC FOOT EXAM  03/27/2025     Defer to next Care Teams outreach follow up due to reason of call today.    Care Plan:   Care Plan: General- Seek medication review/education       Problem: HP GENERAL PROBLEM       Goal: General Goal - I would like to seek medication review/education with care teams by the next 1-2 week.       Start Date: 9/26/2024    This Visit's Progress: 90% Recent Progress: 90%    Note:     Measure of Success: NA  Barriers: Take med wrong.   Strengths: Seek medication education/review with [facility] RN  and seek further advised.   Patient expressed understanding of goal: yes    Action steps to achieve this goal:  1. I will attend appt on 9/26/2024 at 3:00PM with CCC RN via phone visit to seek med review/education and further advise. (Completed; per pt on 9/30/2024).  2. I will attend appt on 10/01/2024 at 9:00AM with [facility]  via phone visit per [facility] RN suggested to support with medicaid. (Completed)  3. I will attend appt on 11/05/2024 with Dr. Nicolas (PCP) to review ibuprofen and gabapentin med concerns and further advise. (Completed- provider is unavailable per pt on 12/05/2024)  4. I will call PCP office and the neurologist office with any other questions. (Completed: neurologist refer me to the pain clinic to help with pain management per pt on 1/15/2025)  5. I will attend appt on 12/10/2024 with Melissa Nye PA-C to review medication concerns. (Completed per pt on 1/15/2025)  6. I will continue to follow up with the pain clinic regarding to pain medication management as recommendation. (Updated: 4/16/2025)- continues  7. I will attend appt on 2/13/2025 with Dr. Nicolas in [facility] follow up DM, review  due care gaps details/completion, and address any med concerns. (Completed)  8. I will attend appt on 2/28/2025 with MTM dept and seek med concern/questions if any. (Updated: 2/24/2025)  9. I will attend appt on 4/07/2025 and 9/05/2025 at 9:20AM with Dr. Nicolas in clinic as schedule. Will review med and seek medical questions if any. (Updated: 4/16/2025)  10. I will connect with PCP/Dr. Nicolas office at [phone] if any questions. (Updated: 4/16/2025)                            Care Plan: General       Problem: HP GENERAL PROBLEM       Goal: General Goal - I would like to find out CADI Waiver status and verify waiver eligibility/available at this time with [facility] dept by the next 1-3 months.       Start Date: 2/24/2025    This Visit's Progress: 20% Recent Progress: 20%    Note:     Measure of Success: NA  Barriers: Patient shared; seeking service/waiver to helps paying for medical appt coverage.   Strengths: Working with [facility] deptLeno for further assistance and verify CADI Waiver eligibility. Apply if hasn't yet.   Patient expressed understanding of goal: yes    Action steps to achieve this goal:  1. I will wait to hear from Leno Pineda, representative with [facility] dept. (Updated: 4/16/2025)- continues  2. I will follow up with Leno Pineda with [facility] dept at [phone] if need. (Updated: 4/16/2025)- continues  3. I will update status with Care Coordination team at the next outreach follow up. (Updated: 4/16/2025)- continues    Note/comment:   -FYI: See CC RN encounntfaustino dated 1/07/2025 for CC CHIO notes details if need. (CHW; 2/24/2025)   -CHW and pt conference call to L[place] Pineda and reach voicemail. Left a voice message on 4/07/25 request Leno to return call to patient directly for update status of CADI Waiver. (CHW; 4/07/2025).                             Patient chart reviewed:   -CHW and pt conference call to the county on 4/07/2025 and left a voice  message on Leno francis with Intake dept at [phone] to return call to patient per pt chart reviewed.    Patient Outreach:   Pt left a voice message on CHW/writer voicemail and request a return call per pt. Pt provides call back phone number and full name.     CHW return call and spoke with pt. Verified reason of call. Pt shared info below and seeking helps. Pt is working toward PCA service coverage and needs CADI. Pt confirmed received no return call from Leno with intake dept as well. Pt agreed to CHW plan below.      Patient shared; concerns- hasn't received a return call from the [facility] or CADI waiver dept for voice messages that left for them. Been months.     CHW Plan:   -Seeking assigned CC lead and CC SW advise per patient request.   -CHW connect with patient again tomorrow, 4/17/2025 per pt request.

## 2025-04-17 LAB — BACTERIA SKIN AEROBE CULT: NORMAL

## 2025-04-17 NOTE — PROGRESS NOTES
Clinic Care Coordination Contact:    Today's date: 4/17/2025    Patient Outreach:   CHW call and spoke with patient. Pt is aware message route to assigned CC lead care coordinator and CC SW for further advise. Pt agreed.     CHW Plan:  -Message route to assigned CC lead care coordinator for further advise. Plan: CC team discuss additional resources to support for patient to connect with the county.

## 2025-04-21 NOTE — TELEPHONE ENCOUNTER
Pt return call. States cut on right side of mouth is healed over. No more crust or puss and swelling has gone done. Over all, symptoms are better. Writer offered appt, but pt declined, states he wants to give it a few more days. Will call clinic back if symptoms worsen.     Goldie DEAN RN  [facility]

## 2025-04-21 NOTE — TELEPHONE ENCOUNTER
RN attempted to contact patient, but no answer. Left message on patient's voice mail to call clinic back.    If patient calls back, please triage patient or schedule an appointment . Thanks    Francisca Baca RN  [facility]      What are your questions or concerns:  corners of mouth. Right side is worse compared to the left. There was a cut on the right corner, after applying for 10 days, getting white like puss around it whenever he applies the ointment. In general it seems to be getter better but the cut is still there. It stopped bleeding.

## 2025-04-23 ENCOUNTER — TELEPHONE (OUTPATIENT)
Dept: FAMILY MEDICINE | Facility: CLINIC | Age: 35
End: 2025-04-23
Payer: COMMERCIAL

## 2025-04-23 DIAGNOSIS — M79.2 NEUROPATHIC PAIN: Primary | ICD-10-CM

## 2025-04-23 DIAGNOSIS — G95.0 SYRINGOMYELIA (H): ICD-10-CM

## 2025-04-23 NOTE — TELEPHONE ENCOUNTER
Order/Referral Request    Who is requesting: Patient    Orders being requested: Neurosurgery    Reason service is needed/diagnosis: N/A    When are orders needed by: asap    Has this been discussed with Provider: N/A    Does patient have a preference on a Group/Provider/Facility? [facility]    Does patient have an appointment scheduled?: No    Where to send orders: Fax [phone], [phone]    Could we send this information to you in [place] or would you prefer to receive a phone call?:   Patient would prefer a phone call   Okay to leave a detailed message?: Yes at Cell number on file:    Telephone Information:   Mobile [phone]   Mobile Not on file.

## 2025-04-23 NOTE — TELEPHONE ENCOUNTER
Call pt. LM on  requesting call back.     If pt return call, please ask indication for neuro surgery referral.     Goldie DEAN RN  [facility]

## 2025-04-23 NOTE — TELEPHONE ENCOUNTER
Dr. Nicolas - Patient requesting referral to [facility] re: second opinion on chronic neuropathic pain   Patient would like to be seen sooner than scheduled follow-up in September 2025 as symptoms are increasingly impacting ADLs.    Patient's next appointment with neurology isn't until September 2025.  Patient was telling his mother about his symptoms, mom feels that patient should be seen before September.  Patient tried to schedule appointment at [facility], but was told he needs a referral from PCP.    Reports he discussed symptoms with PCP at 4/7/25 OV, but notes decreasing ability to perform ADLs.     Deepti Abernathy RN  [facility]

## 2025-04-24 LAB — BACTERIA SKIN AEROBE CULT: NORMAL

## 2025-04-26 ENCOUNTER — MYC REFILL (OUTPATIENT)
Dept: FAMILY MEDICINE | Facility: CLINIC | Age: 35
End: 2025-04-26
Payer: COMMERCIAL

## 2025-04-26 DIAGNOSIS — R25.2 SPASTICITY: ICD-10-CM

## 2025-04-26 DIAGNOSIS — M79.2 NEUROPATHIC PAIN: ICD-10-CM

## 2025-04-26 DIAGNOSIS — R10.13 EPIGASTRIC PAIN: ICD-10-CM

## 2025-04-26 RX ORDER — OMEPRAZOLE 20 MG/1
20 CAPSULE, DELAYED RELEASE ORAL DAILY
Qty: 90 CAPSULE | Refills: 2 | Status: SHIPPED | OUTPATIENT
Start: 2025-04-26

## 2025-04-28 ENCOUNTER — PATIENT OUTREACH (OUTPATIENT)
Dept: CARE COORDINATION | Facility: CLINIC | Age: 35
End: 2025-04-28
Payer: COMMERCIAL

## 2025-04-28 ENCOUNTER — MYC MEDICAL ADVICE (OUTPATIENT)
Dept: FAMILY MEDICINE | Facility: CLINIC | Age: 35
End: 2025-04-28
Payer: COMMERCIAL

## 2025-04-28 DIAGNOSIS — K59.2 NEUROGENIC BOWEL: ICD-10-CM

## 2025-04-28 DIAGNOSIS — G40.909 NONINTRACTABLE EPILEPSY WITHOUT STATUS EPILEPTICUS, UNSPECIFIED EPILEPSY TYPE (H): ICD-10-CM

## 2025-04-28 DIAGNOSIS — G91.9 HYDROCEPHALUS WITH OPERATING SHUNT (H): ICD-10-CM

## 2025-04-28 DIAGNOSIS — S34.3XXS CAUDA EQUINA SPINAL CORD INJURY, SEQUELA: ICD-10-CM

## 2025-04-28 DIAGNOSIS — Q07.01 ARNOLD-CHIARI MALFORMATION, TYPE II (H): ICD-10-CM

## 2025-04-28 DIAGNOSIS — N31.9 NEUROGENIC BLADDER: ICD-10-CM

## 2025-04-28 DIAGNOSIS — R29.898 WEAKNESS OF BOTH LOWER EXTREMITIES: Primary | ICD-10-CM

## 2025-04-28 DIAGNOSIS — G95.0 SYRINGOMYELIA (H): ICD-10-CM

## 2025-04-28 DIAGNOSIS — M79.2 NEUROPATHIC PAIN: ICD-10-CM

## 2025-04-28 RX ORDER — GABAPENTIN 600 MG/1
TABLET ORAL
Qty: 210 TABLET | Refills: 11 | Status: SHIPPED | OUTPATIENT
Start: 2025-04-28

## 2025-04-28 RX ORDER — BACLOFEN 10 MG/1
10 TABLET ORAL 3 TIMES DAILY
Qty: 100 TABLET | Refills: 11 | Status: SHIPPED | OUTPATIENT
Start: 2025-04-28

## 2025-04-28 NOTE — PROGRESS NOTES
Clinic Care Coordination Contact:  Community Health Worker Follow Up    Today's date: 4/28/2025    Reason: return call    Care Gaps:   Health Maintenance Due   Topic Date Due    MEDICARE ANNUAL WELLNESS VISIT  07/17/2021    URINE DRUG SCREEN  04/07/2024    MICROALBUMIN  03/27/2025    DIABETIC FOOT EXAM  03/27/2025    DEPRESSION 6 MO INDEX REPEAT PHQ-9  04/21/2025     Defer to next CC team outreach due to today's reason of call.    Care Plan:   Care Plan: General- Seek medication review/education       Problem: HP GENERAL PROBLEM       Goal: General Goal - I would like to seek medication review/education with care teams by the next 1-2 week.       Start Date: 9/26/2024    This Visit's Progress: 90% Recent Progress: 90%    Note:     Measure of Success: NA  Barriers: Take med wrong.   Strengths: Seek medication education/review with CCC RN  and seek further advised.   Patient expressed understanding of goal: yes    Action steps to achieve this goal:  1. I will attend appt on 9/26/2024 at 3:00PM with CCC RN via phone visit to seek med review/education and further advise. (Completed; per pt on 9/30/2024).  2. I will attend appt on 10/01/2024 at 9:00AM with [facility]  via phone visit per [facility] RN suggested to support with medicaid. (Completed)  3. I will attend appt on 11/05/2024 with Dr. Nicolas (PCP) to review ibuprofen and gabapentin med concerns and further advise. (Completed- provider is unavailable per pt on 12/05/2024)  4. I will call PCP office and the neurologist office with any other questions. (Completed: neurologist refer me to the pain clinic to help with pain management per pt on 1/15/2025)  5. I will attend appt on 12/10/2024 with Melissa Nye PA-C to review medication concerns. (Completed per pt on 1/15/2025)  6. I will continue to follow up with the pain clinic regarding to pain medication management as recommendation. (Updated: 4/16/2025)- continues  7. I will attend appt on 2/13/2025 with  Dr. Nicolas in [facility] follow up DM, review due care gaps details/completion, and address any med concerns. (Completed)  8. I will attend appt on 2/28/2025 with MTM dept and seek med concern/questions if any. (Updated: 2/24/2025)  9. I will attend appt on 4/07/2025 and 9/05/2025 at 9:20AM with Dr. Nicolas in clinic as schedule. Will review med and seek medical questions if any. (Updated: 4/28/2025)  10. I will connect with PCP/Dr. Nicolas office at [phone] if any questions. (Updated: 4/28/2025)                            Care Plan: General       Problem: HP GENERAL PROBLEM       Goal: General Goal - I would like to find out CADI Waiver status and verify waiver eligibility/available at this time with [facility] dept by the next 1-3 months.       Start Date: 2/24/2025    This Visit's Progress: 30% Recent Progress: 20%    Note:     Measure of Success: NA  Barriers: Patient shared; seeking service/waiver to helps paying for medical appt coverage.   Strengths: Working with [facility] deptLeno for further assistance and verify CADI Waiver eligibility. Apply if hasn't yet.   Patient expressed understanding of goal: yes    Action steps to achieve this goal:  1. I will wait to hear from Leno Pineda, representative with [facility] dept. (Updated: 4/16/2025)- continues  2. I will follow up with Leno Pineda with [facility] dept at [phone] if need. (Updated: 4/16/2025)- continues  3. I will update status with Care Coordination team at the next outreach follow up. (Updated: 4/16/2025)- continues  3. I will wait to hear from Leno Pineda or [facility] CADI waiver dept to connect with me. (Updated: 4/28/2025)    Note/comment:   -FYI: See CC RN encounnter dated 1/07/2025 for CC SW notes details if need. (CHW; 2/24/2025)   -CHW and pt conference call to Leno Pineda and reach voicemail. Left a voice message on 4/07/25 request [place] to return call to patient directly for update status of CADI Waiver.  (CHW; 4/07/2025).   -Pt shared; pt sister is informed pt is on the CADI Waiver waitlist due to shot staff. (CHW; 4/28/2025)                            Patient Outreach Discussion:   CC CHW received a voice message from patient . CHW return call and spoke with patient regard to reason above. Pt shared info below and request writer to route pt request below to Dr. Nicolas. Updated goals above. Pt is aware to follow up pt request with Dr. Maria Isabel morris. Pt confirmed.     Patient shared:  -CADI Waiver goal update: Check in with my sister. My sister said that she is informed by the [facility] CADI Waiver dept that I'm in the CADI waitlist due to short staff. Will be contact once get to my turn. Will wait at time.   -Patient request: Filled out form with [facility] that sent to me online and submitted back a month ago. Follow up status. I speak with a different person every time and to re-fill the same form over and over. I told them that I've completed the form and [facility] told me it's a requirement. It don't make sense. Found out there is no referral placed by Dr. Nicolas. Please have Dr. Nicolas place a referral for me. Reason: Dr. Nicolas want me to see by the Neurologist and Neurosurgery team with [facility] for nerve and other health condition concerns.   -Every time I call [facility] follow up paperwork filled out a month ago, they send me the same form to re-fill. I don't understand. Very hard. Would like a referral place by Dr. Nicolas to [facility].     CHW suggestions for patient today:  -Pt may connect CCC/CHW with any additional resource needs and PCP office for scheduling appt.   -Patient may connect with [facility]     CHW Next Follow-up: Goal(s) follow up.    Outreach frequency: monthly      CHW Plan:   -Pt request above: route message to Dr. Nicolas for further advise and clinical team to update status with patient per pt request.   -Next CHW outreach plan: 1 month to monitor the progression of their goal(s).

## 2025-04-28 NOTE — TELEPHONE ENCOUNTER
Writer contacted pt., no answer, left vm informing that writer will also send him a [employer] message. If pt has any questions, he can respond to [employer] message or call clinic and ask to speak with a nurse. See [employer] message dated 4/28/25.     Christiano David, BSN, PHN, RN-[facility]

## 2025-04-28 NOTE — TELEPHONE ENCOUNTER
Pacheco was seen today for referral.    Diagnoses and all orders for this visit:    Neuropathic pain  -     Adult Neurology  Referral; Future    Syringomyelia (H)  -     Adult Neurology  Referral; Future

## 2025-04-29 ENCOUNTER — PATIENT OUTREACH (OUTPATIENT)
Dept: CARE COORDINATION | Facility: CLINIC | Age: 35
End: 2025-04-29
Payer: COMMERCIAL

## 2025-04-29 NOTE — TELEPHONE ENCOUNTER
This is appropriate. The patient has progressive weakness and progressive severe centrally mediated pain in the setting of history of meningitis 2009, arnold chiari type II, cauda equina, syringomyelia thoracic to conus, hydrocephalus w/  shunt, neurogenic bladder/bowel.     He has received good care and is looking for additional opinions and treatment options due to the progressive nature and severity of his symptoms.    Pacheco was seen today for clinic care coordination - follow-up.    Diagnoses and all orders for this visit:    Weakness of both lower extremities  -     Adult Neurology  Referral; Future  -     Adult Neurosurgery  Referral; Future    Neuropathic pain  -     Adult Neurology  Referral; Future  -     Adult Neurosurgery  Referral; Future    Hydrocephalus with operating  shunt (H)  -     Adult Neurology  Referral; Future  -     Adult Neurosurgery  Referral; Future    Arnold-Chiari malformation, type II (H)  -     Adult Neurology  Referral; Future  -     Adult Neurosurgery  Referral; Future    Syringomyelia (H)  -     Adult Neurology  Referral; Future  -     Adult Neurosurgery  Referral; Future    Nonintractable epilepsy without status epilepticus, unspecified epilepsy type (H)  -     Adult Neurology  Referral; Future  -     Adult Neurosurgery  Referral; Future    Cauda equina spinal cord injury, sequela  -     Adult Neurology  Referral; Future  -     Adult Neurosurgery  Referral; Future    Neurogenic bowel  -     Adult Neurology  Referral; Future  -     Adult Neurosurgery  Referral; Future    Neurogenic bladder  -     Adult Neurology  Referral; Future  -     Adult Neurosurgery  Referral; Future

## 2025-04-30 ENCOUNTER — PATIENT OUTREACH (OUTPATIENT)
Dept: CARE COORDINATION | Facility: CLINIC | Age: 35
End: 2025-04-30
Payer: COMMERCIAL

## 2025-04-30 NOTE — PROGRESS NOTES
Clinic Care Coordination Contact  Care Coordination Clinician Chart Review    Situation: Patient chart reviewed by Care Coordinator.       Background: Care Coordination Program started: 6/14/2024. Initial assessment completed and patient-centered care plan(s) were developed with participation from patient. Lead CC handed patient off to CHW for continued outreaches.       Assessment: Per chart review, patient outreach completed by CC CHW on 04/28/2025.  Patient is actively working to accomplish goal(s). Patient's goal(s) appropriate and relevant at this time. Patient is due for updated Plan of Care.  Assessments will be completed annually or as needed/with change of patient status.      Care Plan: General- Seek medication review/education       Problem: HP GENERAL PROBLEM       Goal: General Goal - I would like to seek medication review/education with care teams by the next 1-2 week.       Start Date: 9/26/2024    This Visit's Progress: 90% Recent Progress: 90%    Note:     Measure of Success: NA  Barriers: Take med wrong.   Strengths: Seek medication education/review with CCC RN  and seek further advised.   Patient expressed understanding of goal: yes    Action steps to achieve this goal:  1. I will attend appt on 9/26/2024 at 3:00PM with CCC RN via phone visit to seek med review/education and further advise. (Completed; per pt on 9/30/2024).  2. I will attend appt on 10/01/2024 at 9:00AM with [facility]  via phone visit per [facility] RN suggested to support with medicaid. (Completed)  3. I will attend appt on 11/05/2024 with Dr. Nicolas (PCP) to review ibuprofen and gabapentin med concerns and further advise. (Completed- provider is unavailable per pt on 12/05/2024)  4. I will call PCP office and the neurologist office with any other questions. (Completed: neurologist refer me to the pain clinic to help with pain management per pt on 1/15/2025)  5. I will attend appt on 12/10/2024 with Melissa Nye PA-C to  review medication concerns. (Completed per pt on 1/15/2025)  6. I will continue to follow up with the pain clinic regarding to pain medication management as recommendation. (Updated: 4/16/2025)- continues  7. I will attend appt on 2/13/2025 with Dr. Nicolas in [facility] follow up DM, review due care gaps details/completion, and address any med concerns. (Completed)  8. I will attend appt on 2/28/2025 with MTM dept and seek med concern/questions if any. (Updated: 2/24/2025)  9. I will attend appt on 4/07/2025 and 9/05/2025 at 9:20AM with Dr. Nicolas in clinic as schedule. Will review med and seek medical questions if any. (Updated: 4/28/2025)  10. I will connect with PCP/Dr. Nicolas office at [phone] if any questions. (Updated: 4/28/2025)                            Care Plan: General       Problem: HP GENERAL PROBLEM       Goal: General Goal - I would like to find out CADI Waiver status and verify waiver eligibility/available at this time with [facility] dept by the next 1-3 months.       Start Date: 2/24/2025    This Visit's Progress: 30% Recent Progress: 20%    Note:     Measure of Success: NA  Barriers: Patient shared; seeking service/waiver to helps paying for medical appt coverage.   Strengths: Working with [facility] deptLeno for further assistance and verify CADI Waiver eligibility. Apply if hasn't yet.   Patient expressed understanding of goal: yes    Action steps to achieve this goal:  1. I will wait to hear from Leno Pineda, representative with [facility] dept. (Updated: 4/16/2025)- continues  2. I will follow up with Leno Pineda with [facility] dept at [phone] if need. (Updated: 4/16/2025)- continues  3. I will update status with Care Coordination team at the next outreach follow up. (Updated: 4/16/2025)- continues  3. I will wait to hear from Leno Pineda or [facility] CADI waiver dept to connect with me. (Updated: 4/28/2025)    Note/comment:   -FYI: See CC RN encounnter  dated 1/07/2025 for CC SW notes details if need. (CHW; 2/24/2025)   -CHW and pt conference call to Leno Pineda and reach voicemail. Left a voice message on 4/07/25 request Leno to return call to patient directly for update status of CADI Waiver. (CHW; 4/07/2025).   -Pt shared; pt sister is informed pt is on the CADI Waiver waitlist due to shot staff. (CHW; 4/28/2025)                                 Plan/Recommendations: The patient will continue working with Care Coordination to achieve goal(s) as above. CHW will continue outreaches at minimum every 30 days and will involve Lead CC as needed or if patient is ready to move to Maintenance. Lead CC will continue to monitor CHW outreaches and patient's progress to goal(s) every 6 weeks.     Plan of Care updated and sent to patient: DAISHA Ring   Social Work Primary Care Clinic Care Coordinator   [facility] [phone] [email or web address]

## 2025-05-01 ENCOUNTER — TELEPHONE (OUTPATIENT)
Dept: FAMILY MEDICINE | Facility: CLINIC | Age: 35
End: 2025-05-01
Payer: COMMERCIAL

## 2025-05-01 LAB — BACTERIA SKIN AEROBE CULT: NORMAL

## 2025-05-01 NOTE — TELEPHONE ENCOUNTER
Pt called following up on Neurology referral to [facility]. Chart reviewed. PCP placed neurology referrals for both [facility] and [facility]. Pt reported he is not going to [facility], but [facility].     Writer informed pt to call [facility] to schedule neurology appt. Gave [facility] neurology department number [phone] to call. Advised pt to call primary care clinic back if there are issues. Pt verbalized understanding.    Christiano David, JUNN, PHN, RN-[facility]

## 2025-05-05 ENCOUNTER — PATIENT OUTREACH (OUTPATIENT)
Dept: CARE COORDINATION | Facility: CLINIC | Age: 35
End: 2025-05-05
Payer: COMMERCIAL

## 2025-05-05 LAB — BACTERIA SKIN AEROBE CULT: NO GROWTH

## 2025-05-05 NOTE — PROGRESS NOTES
Clinic Care Coordination Contact  Community Health Worker Follow Up    Reason: Offer appt with CC RN and remind appt with MTM    Patient Outreach:   CHW calling the patient back regard to reason above. Offered appt with CC RN if needed to be sooner than appt with MTM on 5/13/2025. Verified if pt is comfortable to ask his family member to help with med boxes at this time. Patient declined appt with CC RN at this time. Pt prefer to ask family member to assist with med box and plan to attend future MTM appt. Suggested pt to connect with PCP office and MTM office if any med concerns or questions. Pt confirmed.     Patient shared he have future appt date below with MTM.   Name: Pacheco David MRN: [id number]     Date: 5/13/2025 Status: Scheduled     Time: 10:30 AM Length: 60     Visit Type: MTM RETURN [1610] Copay: $0.00     Provider: Gabriela Alfonso RPH Department: [facility] PRIMARY     Encounter #: [id number] Notes: f/u   Printed on:           Plan:   -CHW next outreach plan: 1 month.  -Message route to update CC RN and assigned CC lead care coordinator/SW.   -Route message to update Dr. Nicolas/PCP.

## 2025-05-05 NOTE — PROGRESS NOTES
Clinic Care Coordination Contact:  Community Health Worker Follow Up    Today's date: 5/5/2025    Reason(s):  -Returning call  -CHW Follow Up Call     Care Gaps:   Health Maintenance Due   Topic Date Due    MEDICARE ANNUAL WELLNESS VISIT  07/17/2021    URINE DRUG SCREEN  04/07/2024    MICROALBUMIN  03/27/2025    DIABETIC FOOT EXAM  03/27/2025    EYE EXAM  06/01/2025     Defer to next CC team outreach due to coordinate care reason.    Care Plan:   Care Plan: General- Seek medication review/education       Problem: HP GENERAL PROBLEM       Goal: General Goal - I would like to seek medication review/education with care teams by the next 1-2 week.       Start Date: 9/26/2024    This Visit's Progress: 90% Recent Progress: 90%    Note:     Measure of Success: NA  Barriers: Take med wrong.   Strengths: Seek medication education/review with CCC RN  and seek further advised.   Patient expressed understanding of goal: yes    Action steps to achieve this goal:  1. I will attend appt on 9/26/2024 at 3:00PM with CCC RN via phone visit to seek med review/education and further advise. (Completed; per pt on 9/30/2024).  2. I will attend appt on 10/01/2024 at 9:00AM with [facility]  via phone visit per [facility] RN suggested to support with medicaid. (Completed)  3. I will attend appt on 11/05/2024 with Dr. Nicolas (PCP) to review ibuprofen and gabapentin med concerns and further advise. (Completed- provider is unavailable per pt on 12/05/2024)  4. I will call PCP office and the neurologist office with any other questions. (Completed: neurologist refer me to the pain clinic to help with pain management per pt on 1/15/2025)  5. I will attend appt on 12/10/2024 with Melissa Nye PA-C to review medication concerns. (Completed per pt on 1/15/2025)  6. I will continue to follow up with the pain clinic regarding to pain medication management as recommendation. (Updated: 4/16/2025)- continues  7. I will attend appt on 2/13/2025  with Dr. Nicolas in [facility] follow up DM, review due care gaps details/completion, and address any med concerns. (Completed)  8. I will attend appt on 2/28/2025 with MTM dept and seek med concern/questions if any. (Updated: 2/24/2025)  9. I will attend appt on 4/07/2025 and 9/05/2025 at 9:20AM with Dr. Nicolas in clinic as schedule. Will review med and seek medical questions if any. (Updated: 4/28/2025)  10. I will connect with PCP/Dr. Nicolas office at [phone] if any questions. (Updated: 4/28/2025)    (Updated: 5/05/2025)- unable to follow up goal due to coordinate care reason.                            Care Plan: General       Problem: HP GENERAL PROBLEM       Goal: General Goal - I would like to find out CADI Waiver status and verify waiver eligibility/available at this time with [facility] dept by the next 1-3 months.       Start Date: 2/24/2025    This Visit's Progress: 30% Recent Progress: 30%    Note:     Measure of Success: NA  Barriers: Patient shared; seeking service/waiver to helps paying for medical appt coverage.   Strengths: Working with [facility] deptLeno for further assistance and verify CADI Waiver eligibility. Apply if hasn't yet.   Patient expressed understanding of goal: yes    Action steps to achieve this goal:  1. I will wait to hear from Leno Pineda, representative with [facility] dept. (Updated: 4/16/2025)- continues  2. I will follow up with Leno Pineda with [facility] dept at [phone] if need. (Updated: 4/16/2025)- continues  3. I will update status with Care Coordination team at the next outreach follow up. (Updated: 4/16/2025)- continues  3. I will wait to hear from Leno Pineda or [facility] CADI waiver dept to connect with me. (Updated: 4/28/2025)    Note/comment:   -FYI: See CC RN encounnter dated 1/07/2025 for CC SW notes details if need. (CHW; 2/24/2025)   -CHW and pt conference call to Leno Pineda and reach voicemail. Left a voice message on  "4/07/25 request Tong to return call to patient directly for update status of CADI Waiver. (CHW; 4/07/2025).   -Pt shared; pt sister is informed pt is on the CADI Waiver waitlist due to shot staff. (CHW; 4/28/2025)    (Updated: 5/05/2025)- unable to follow up goal due to coordinate care reason.                            Patient Outreach Discussion:   CC CHW was able to connect with patient today regard to reason above. CHW relayed referral(s) placed by Dr. Nicolas dated 4/29/2025 per CHW encounter dated 4/28/2025 with pt.     Patient shared:  -My brain is losing my \"cognitive thinking\" and can't remember to do this and in a split second, I don't remember what I need to do.   -No one able to help with set up my med box.   -Have pain all over my body. Medication isn't helping. Rate pain at a 9 or 10.   -[place] from [facility]. Waiting for them to call me to schedule my appt.     3:48PM: CHW coordinate with scheduling line to transfer the call nurse triage. Writer provide pt MRN#, explained reason of call. Pt shared pain level at at \"9 or 10.\" Call transfer to the pt primary care office.     Spoke with Jennifer with [facility]. Writer explained reason of call, provided pt MRN#. Jennifer able to looked up pt. Phone is echoing and is hard to understand. CHW asked if Jennifer able to call the pt back if end call to avoid echoing. Jennifer confirmed that she going to call the patient. CHW confirmed with pt ending call at 3:59PM. Pt confirmed.    Note/comment:   CHW message CC RN and CC SW/assigned lead care coordinator today at 3:38PM via Teams (CC with C POD).     CHW Next Follow-up: Goal(s) follow up.    Outreach frequency: monthly      CHW Plan:   -Next CHW outreach plan: 1 month to monitor the progression of their goal(s).        " English

## 2025-05-05 NOTE — PROGRESS NOTES
Clinic Care Coordination Contact:    Reason: voice message left on CHW voicemail    Coordinate Care:   Patient left a voice message on CHW voicemail that he have updates to share with CHW.    Plan:   Connect with patient today.

## 2025-05-05 NOTE — PROGRESS NOTES
Clinic Care Coordination Contact:  Community Health Worker Follow Up    Reason: confirm coordinate call     4:19PM: Writer do not see an telephone encounter indication of rep name Jennifer is calling the pt back per pt chart reviewed. CHW call the pt back. Spoke with pt. Verified if anyone calling him back. Pt confirmed no.     CHW offered to coordinate pt with nurse triage again at one of the numbers below. Pt shared that his pain level is lowered to a 6 or 7 at this time. Pt declined connect with nurse triage per CHW offered.     CHW provides suggestions below with pt:  -Pt continues to monitor his pain level and connect with 911 line if getting worse or emergency attend need.  -Pt to not connect writer with emergency attention situation or pain level as Care Coordination service is not an urgent line. Prefer pt to seek service immediately by connect with RSC/PCP office to speak with a nurse/nurse team, or to be connect with nurse triage.    -Writer will be off starting tomorrow, 5/06/2025 and returning next week, 5/14/2025.   -Pt confirmed to suggestions above.    Per Intranet Home page search:       Plan:   Next CHW outreach plan: 1 month.

## 2025-05-13 ENCOUNTER — VIRTUAL VISIT (OUTPATIENT)
Dept: PHARMACY | Facility: CLINIC | Age: 35
End: 2025-05-13
Payer: COMMERCIAL

## 2025-05-13 DIAGNOSIS — Z79.4 TYPE 2 DIABETES MELLITUS WITHOUT COMPLICATION, WITH LONG-TERM CURRENT USE OF INSULIN (H): Primary | ICD-10-CM

## 2025-05-13 DIAGNOSIS — K59.00 CONSTIPATION, UNSPECIFIED CONSTIPATION TYPE: ICD-10-CM

## 2025-05-13 DIAGNOSIS — E11.9 TYPE 2 DIABETES MELLITUS WITHOUT COMPLICATION, WITH LONG-TERM CURRENT USE OF INSULIN (H): Primary | ICD-10-CM

## 2025-05-13 DIAGNOSIS — K21.9 GASTROESOPHAGEAL REFLUX DISEASE, UNSPECIFIED WHETHER ESOPHAGITIS PRESENT: ICD-10-CM

## 2025-05-13 DIAGNOSIS — E66.01 SEVERE OBESITY (BMI 35.0-35.9 WITH COMORBIDITY) (H): ICD-10-CM

## 2025-05-13 DIAGNOSIS — Z78.9 TAKES DIETARY SUPPLEMENTS: ICD-10-CM

## 2025-05-13 PROCEDURE — 99207 PR NO CHARGE LOS: CPT | Mod: 93

## 2025-05-13 RX ORDER — SENNA AND DOCUSATE SODIUM 50; 8.6 MG/1; MG/1
2 TABLET, FILM COATED ORAL AT BEDTIME
Qty: 180 TABLET | Refills: 3 | Status: SHIPPED | OUTPATIENT
Start: 2025-05-13

## 2025-05-13 NOTE — PROGRESS NOTES
Medication Therapy Management (MTM) Encounter    ASSESSMENT:                            Medication Adherence/Access: No issues identified.    Diabetes, Weight Management  A1c is at goal of <7%.  Although patient reports no weight loss with Ozempic, he is down ~57 lbs since April 2024 (when Ozempic was started). Although appetite is reduced since switching to Mounjaro, portion sizes have reduced by only 25% and patient has not lost any additional weight. Patient would benefit from increasing dose of Mounjaro.     Constipation   Although bowel movement frequency has increased to every other day with senna-docusate, patient is still straining. Patient would benefit form increasing dose of senna-docusate.    GERD    Stable.     Supplements   Stable.    PLAN:                            Finish supply of Mounjaro 7.5 mg, then increase to 10 mg weekly  Increase senna-docusate to 2 tablets at bedtime    Follow-up: with MTM via phone on:  Thursday Jun 19, 2025   Appt at 1:00 PM  Telephone      SUBJECTIVE/OBJECTIVE:                          Pacheco David is a 34 year old male seen for a follow-up visit.     Reason for visit: Follow up.    Allergies/ADRs: Reviewed in chart  Past Medical History: Reviewed in chart  Tobacco: He reports that he has never smoked. He has never been exposed to tobacco smoke. He has never used smokeless tobacco.  Alcohol: none    Medication Adherence/Access: no issues reported.    Diabetes , Weight Management  Mounjaro 7.5 mg weekly on Tuesdays  Has given 2 doses of Mouonjaro 7.5 mg. Has noticed a reduction in appetite and is feeling less hungry since switching from Ozempic to Mounjaro. Is no longer asking for extra servings, portion sizes have reduced by ~25%. He has lost 3 lbs. Is tolerating Mounjaro well, however, he is experiencing constipation. Patient is not experiencing any other side effects.  Blood sugar monitoring: Patient isn't monitoring his blood sugar regularly. He was prescribed CGM, but it  "wasn't covered by insurance because he isn't using insulin. He reports no symptoms of hypoglycemia.  Diet/exercise: Reports eating a \"diabetic diet\", lunch and dinner are typically meals from [employer]. He might have an apple or banana between lunch and dinner. He is unable to get regular exercise due to physical limitations.    Medication considerations:  Metformin caused diarrhea     Eye exam in the last 12 months? No  Foot exam: due    Constipation   Miralax 17 grams daily  Senna-docusate 8.6-50 mg daily  Patient reports no current medication side effects.   He was able to get senna-docusate from pharmacy and it's helping have more regular bowel movements. Patient is having bowel movements every other day. However, he reports he is pushing so hard when having a bowel movement that he feels really fatigued. Reports he's staying hydrated, drinks 48 ounces of water daily.     GERD    Omeprazole 20 mg daily  Patient restarted omeprazole and reports no current symptoms.     Supplements   Turmeric 1 capsule twice daily  Vitamin D 2000 units daily  Vitamin B12 1000 mcg daily  PCP switching B12 injection to tablet and patient reports he's started taking.  No issues reported at this time.     Today's Vitals: There were no vitals taken for this visit.  ----------------    I spent 46 minutes with this patient today. All changes were made via collaborative practice agreement with Lary Nicolas MD.     A summary of these recommendations was sent via [employer].    Gabriela Alfonso (Hailie), ZahraD, MPH  Medication Therapy Management Pharmacist     Telemedicine Visit Details  The patient's medications can be safely assessed via a telemedicine encounter.  Type of service:  Telephone visit  Originating Location (pt. Location): Home    Distant Location (provider location):  Off-site  Start Time: 10:30 AM  End Time: 11:16 AM     Medication Therapy Recommendations  Constipation, unspecified constipation type   1 Current Medication: " SENNA-docusate sodium (SENNA S) 8.6-50 MG tablet (Discontinued)   Current Medication Sig: Take 1 tablet by mouth at bedtime. Hold if diarrhea   Rationale: Dose too low - Dosage too low - Effectiveness   Recommendation: Increase Dose   Status: Accepted per CPA   Identified Date: 5/13/2025 Completed Date: 5/13/2025         Morbid obesity (H)   1 Current Medication: Tirzepatide (MOUNJARO) 7.5 MG/0.5ML SOAJ auto-injector pen   Current Medication Sig: Inject 0.5 mLs (7.5 mg) subcutaneously once a week.   Rationale: Dose too low - Dosage too low - Effectiveness   Recommendation: Increase Dose   Status: Accepted per CPA   Identified Date: 5/13/2025 Completed Date: 5/13/2025         Takes dietary supplements   1 Current Medication: cyanocobalamin (CYANOCOBALAMIN) 1000 mcg/mL injection (Discontinued)   Current Medication Sig: Inject 1 mL (1,000 mcg) into the muscle once a week.   Rationale: Does not understand instructions - Adherence - Adherence   Recommendation: Change Medication - cyanocobalamin 500 MCG tablet   Status: Accepted per Provider   Identified Date: 4/1/2025 Completed Date: 5/13/2025

## 2025-05-14 NOTE — PATIENT INSTRUCTIONS
"Recommendations from today's MTM visit:                                                       Finish supply of Mounjaro 7.5 mg, then increase to 10 mg weekly  Increase senna-docusate to 2 tablets at bedtime     Follow-up: with MTM via phone on:  Thursday Jun 19, 2025   Appt at 1:00 PM  Telephone      It was great speaking with you today.  I value your experience and would be very thankful for your time in providing feedback in our clinic survey. In the next few days, you may receive an email or text message from [employer] with a link to a survey related to your  clinical pharmacist.\"     To schedule another MTM appointment, please call the clinic directly or you may call the MTM scheduling line at [phone] or toll-free at [phone].     My Clinical Pharmacist's contact information:                                                      Please feel free to contact me with any questions or concerns you have.      Gabriela Alfonso (Hailie), PharmD, MPH  Medication Therapy Management Pharmacist    "

## 2025-05-20 ENCOUNTER — MYC REFILL (OUTPATIENT)
Dept: FAMILY MEDICINE | Facility: CLINIC | Age: 35
End: 2025-05-20
Payer: COMMERCIAL

## 2025-05-20 DIAGNOSIS — M79.2 NEUROPATHIC PAIN: ICD-10-CM

## 2025-05-21 RX ORDER — ACETAMINOPHEN 500 MG
1000 TABLET ORAL 3 TIMES DAILY
Qty: 200 TABLET | Refills: 4 | Status: SHIPPED | OUTPATIENT
Start: 2025-05-21

## 2025-05-29 ENCOUNTER — MYC REFILL (OUTPATIENT)
Dept: FAMILY MEDICINE | Facility: CLINIC | Age: 35
End: 2025-05-29
Payer: COMMERCIAL

## 2025-05-29 DIAGNOSIS — R10.13 EPIGASTRIC PAIN: ICD-10-CM

## 2025-05-29 RX ORDER — OMEPRAZOLE 20 MG/1
20 CAPSULE, DELAYED RELEASE ORAL DAILY
Qty: 90 CAPSULE | Refills: 2 | OUTPATIENT
Start: 2025-05-29

## 2025-06-12 ENCOUNTER — PATIENT OUTREACH (OUTPATIENT)
Dept: CARE COORDINATION | Facility: CLINIC | Age: 35
End: 2025-06-12
Payer: COMMERCIAL

## 2025-06-12 NOTE — PROGRESS NOTES
Clinic Care Coordination Contact:  Community Health Worker Follow Up    Today's date: 6/12/2025    Reason: CC CHW Follow Up Call (follow up current goal's)    Care Gaps:   Health Maintenance Due   Topic Date Due    MEDICARE ANNUAL WELLNESS VISIT  07/17/2021    URINE DRUG SCREEN  04/07/2024    MICROALBUMIN  03/27/2025    DIABETIC FOOT EXAM  03/27/2025    EYE EXAM  06/01/2025     Patient have future annual wellness visit appt schedule on 9/05/2025 with Dr. Nicolas per appt desk reviewed. Pt is informed to connect with PCP office if any health and scheduling appt questions.     Care Plan:   Care Plan: General- Seek medication review/education       Problem: HP GENERAL PROBLEM       Goal: General Goal - I would like to seek medication review/education with care teams by the next 1-2 week.       Start Date: 9/26/2024    This Visit's Progress: 90% Recent Progress: 90%    Note:     Measure of Success: NA  Barriers: Take med wrong.   Strengths: Seek medication education/review with CCC RN  and seek further advised.   Patient expressed understanding of goal: yes    Action steps to achieve this goal:  1. I will attend appt on 9/26/2024 at 3:00PM with CCC RN via phone visit to seek med review/education and further advise. (Completed; per pt on 9/30/2024).  2. I will attend appt on 10/01/2024 at 9:00AM with [facility]  via phone visit per [facility] RN suggested to support with medicaid. (Completed)  3. I will attend appt on 11/05/2024 with Dr. Nicolas (PCP) to review ibuprofen and gabapentin med concerns and further advise. (Completed- provider is unavailable per pt on 12/05/2024)  4. I will call PCP office and the neurologist office with any other questions. (Completed: neurologist refer me to the pain clinic to help with pain management per pt on 1/15/2025)  5. I will attend appt on 12/10/2024 with Melissa Nye PA-C to review medication concerns. (Completed per pt on 1/15/2025)  6. I will continue to follow up with the  pain clinic regarding to pain medication management as recommendation. (Updated: 6/12/2025)- continues  7. I will attend appt on 2/13/2025 with Dr. Nicolas in [facility] follow up DM, review due care gaps details/completion, and address any med concerns. (Completed)  8. I will attend appt on 2/28/2025 with MTM dept and seek med concern/questions if any. (Completed)  9. I will attend appt on 4/07/2025 and 9/05/2025 at 9:20AM with Dr. Nicolas in clinic as schedule. Will review med and seek medical questions if any. (Updated: 6/12/2025)-continues  10. I will connect with PCP/Dr. Nicolas office at [phone] if any questions. (Updated: 6/12/2025)- continues  11. I will attend appt on 6/19/2025 with MTM via telephone visit for follow up, review any med concerns, and discuss refill need (if any). (Updated: 6/12/2025)                                Care Plan: General       Problem: HP GENERAL PROBLEM       Goal: General Goal - I would like to find out CADI Waiver status and verify waiver eligibility/available at this time with [facility] dept by the next 1-3 months.       Start Date: 2/24/2025    This Visit's Progress: 30% Recent Progress: 30%    Note:     Measure of Success: NA  Barriers: Patient shared; seeking service/waiver to helps paying for medical appt coverage.   Strengths: Working with [facility] deptLeno for further assistance and verify CADI Waiver eligibility. Apply if hasn't yet.   Patient expressed understanding of goal: yes    Action steps to achieve this goal:  1. I will wait to hear from Leno Pineda, representative with [facility] dept. (Updated: 4/16/2025)- continues  2. I will follow up with Leno Pineda with [facility] dept at [phone] if need. (Updated: 6/12/2025)- continues  3. I will update status with Care Coordination team at the next outreach follow up. (Updated: 6/12/2025)- continues  3. I will wait to hear from Leno Pineda or [facility] CADI waiver dept to connect  with me. (Updated: 6/12/2025)- continues    Note/comment:   -FYI: See CC RN saran dated 1/07/2025 for CC CHIO notes details if need. (CHW; 2/24/2025)   -CHW and pt conference call to Leno Pineda and reach voicemail. Left a voice message on 4/07/25 request Leno to return call to patient directly for update status of CADI Waiver. (CHW; 4/07/2025).   -Pt shared; pt sister is informed pt is on the CADI Waiver waitlist due to shot staff. (CHW; 4/28/2025)    (Updated: 6/12/2025)- CADI Waiver status: they are working on it per patient shared.                             Patient Outreach Discussion:   CC CHW was able to connect with patient today regard to reason(s) above. Check in how patient is doing and any questions or concerns at this time.     Patient shared:  -Doing good.   -Received all med for this month.   -Still waiting to hear from the [facility] regard to CADI waiver. They are working on it.     CHW suggestions for patient today:  -Pt may connect CCC/CHW with any additional resource needs and PCP office for scheduling appt.     Appt reminder:   Patient is reminded to future appt date below with MTM per appt desk reviewed. Pt confirmed and shared that it is a telephone call appt.   Name: Pacheco David MRN: [id number]     Date: 6/19/2025 Status: Scheduled     Time: 1:00 PM Length: 60     Visit Type: MTM RETURN [8385] Copay: $0.00     Provider: Gabriela Alfonso [facility] Department: [facility] PRIMARY       Notes: rony     Name: Pacheco David MRN: [id number]     Date: 9/5/2025 Status: Scheduled     Time: 9:20 AM Length: 40     Visit Type: [facility] MEDICARE ANNUAL WELLNESS [0853] Copay: $0.00     Provider: [facility] Nicolas MD [facility]       Notes: My nerve pain status and what is going on with me and a few other problems     CHW Next Follow-up: Goal(s) follow up.    Outreach frequency: monthly      CHW Plan:   -Next CHW outreach plan: 1 month to monitor the progression of their goal(s).

## 2025-06-14 ENCOUNTER — TRANSFERRED RECORDS (OUTPATIENT)
Dept: MULTI SPECIALTY CLINIC | Facility: CLINIC | Age: 35
End: 2025-06-14
Payer: COMMERCIAL

## 2025-06-14 LAB — RETINOPATHY: NORMAL

## 2025-06-16 ENCOUNTER — MYC MEDICAL ADVICE (OUTPATIENT)
Dept: FAMILY MEDICINE | Facility: CLINIC | Age: 35
End: 2025-06-16
Payer: COMMERCIAL

## 2025-06-16 DIAGNOSIS — M79.2 NEUROPATHIC PAIN: ICD-10-CM

## 2025-06-16 DIAGNOSIS — E78.5 HYPERLIPIDEMIA, UNSPECIFIED HYPERLIPIDEMIA TYPE: Chronic | ICD-10-CM

## 2025-06-16 DIAGNOSIS — E55.9 VITAMIN D DEFICIENCY: ICD-10-CM

## 2025-06-16 DIAGNOSIS — K59.00 CONSTIPATION, UNSPECIFIED CONSTIPATION TYPE: ICD-10-CM

## 2025-06-16 RX ORDER — ROSUVASTATIN CALCIUM 20 MG/1
TABLET, COATED ORAL
Qty: 90 TABLET | Refills: 2 | Status: SHIPPED | OUTPATIENT
Start: 2025-06-16

## 2025-06-16 RX ORDER — TRAZODONE HYDROCHLORIDE 100 MG/1
100 TABLET ORAL AT BEDTIME
Qty: 90 TABLET | Refills: 4 | OUTPATIENT
Start: 2025-06-16

## 2025-06-16 RX ORDER — SENNA AND DOCUSATE SODIUM 50; 8.6 MG/1; MG/1
2 TABLET, FILM COATED ORAL AT BEDTIME
Qty: 180 TABLET | Refills: 3 | OUTPATIENT
Start: 2025-06-16

## 2025-06-16 RX ORDER — ACETAMINOPHEN 500 MG
1000 TABLET ORAL 3 TIMES DAILY
Qty: 200 TABLET | Refills: 4 | OUTPATIENT
Start: 2025-06-16

## 2025-06-16 RX ORDER — CHOLECALCIFEROL (VITAMIN D3) 50 MCG
1 TABLET ORAL DAILY
Qty: 100 TABLET | Refills: 4 | OUTPATIENT
Start: 2025-06-16

## 2025-06-16 NOTE — TELEPHONE ENCOUNTER
Medication Question or Refill    What medication are you calling about (include dose and sig)?: acetaminophen (TYLENOL) 500 MG tablet     Preferred Pharmacy:  [facility] - [place], [place] - [address]  [address]  [address]  [place] [address]  Phone: [phone] Fax: [phone]    Who prescribed the medication?: Lary Nicolas     Do you need a refill? Yes    When did you use the medication last? 2 weeks ago    Do you have any questions or concerns?  Yes: Hey can you send acetaminophen to [facility] is billing the wrong Insurance please respond asap im in a lot pain i've been missing this medication for 2 weeks and couldn't get in contact with you     Could we send this information to you in [employer] or would you prefer to receive a phone call?:   Patient would like to be contacted via [employer]     Routed to Dr. Nicolas/HELDER to review.  Pended, sign if agree.    Varun MURILLO RN  [facility]

## 2025-06-16 NOTE — TELEPHONE ENCOUNTER
Medication Question or Refill        What medication are you calling about (include dose and sig)?: all meds pended    Preferred Pharmacy:   [facility] - [place], [place] - [address]  [address]  [address]  [place] [address]  Phone: [phone] Fax: [phone]          Controlled Substance Agreement on file:   CSA -- Patient Level:    CSA: None found at the patient level.       Who prescribed the medication?: PCP    Do you need a refill? Yes    When did you use the medication last? N/A    Patient offered an appointment? No    Do you have any questions or concerns?  No      Could we send this information to you in [facility] or would you prefer to receive a phone call?:   Patient would prefer a phone call   Okay to leave a detailed message?: Yes at Cell number on file:    Telephone Information:   Mobile [phone]   Mobile Not on file.

## 2025-06-17 ENCOUNTER — PATIENT OUTREACH (OUTPATIENT)
Dept: CARE COORDINATION | Facility: CLINIC | Age: 35
End: 2025-06-17
Payer: COMMERCIAL

## 2025-06-17 RX ORDER — ACETAMINOPHEN 500 MG
1000 TABLET ORAL 3 TIMES DAILY
Qty: 200 TABLET | Refills: 0 | Status: SHIPPED | OUTPATIENT
Start: 2025-06-17

## 2025-06-17 RX ORDER — IBUPROFEN 800 MG/1
800 TABLET, FILM COATED ORAL EVERY 8 HOURS PRN
Qty: 100 TABLET | Refills: 0 | Status: SHIPPED | OUTPATIENT
Start: 2025-06-17

## 2025-06-17 ASSESSMENT — ACTIVITIES OF DAILY LIVING (ADL): DEPENDENT_IADLS:: CLEANING;COOKING;LAUNDRY;MEAL PREPARATION;TRANSPORTATION;SHOPPING

## 2025-06-17 NOTE — LETTER
[facility]  Patient Centered Plan of Care  About Me:        Patient Name:  Pacheco Jimenez    YOB: 1990  Age:         34 year old   Brandi MRN:    [id number] Telephone Information:  Home Phone [phone]   Mobile [phone]   Mobile Not on file.   Home Phone Not on file.   Home Phone Not on file.       Address:  [address] [place] [address] Email address:  [email or web address]      Emergency Contact(s)    Name Relationship Lgl Grd Work Phone Home Phone Mobile Phone   1. KECIA JIMENEZ Mother    [phone]   2. FABIANA JIMENEZ Sister    [phone]           Primary language:  English     needed? No   [employer]:  [phone] op. 1  Other communication barriers:None    Preferred Method of Communication:     Current living arrangement: I live in a private home with family    Mobility Status/ Medical Equipment: Independent w/Device        Health Maintenance  Health Maintenance Reviewed: Due/Overdue   Health Maintenance Due   Topic Date Due    MEDICARE ANNUAL WELLNESS VISIT  07/17/2021    URINE DRUG SCREEN  04/07/2024    MICROALBUMIN  03/27/2025    DIABETIC FOOT EXAM  03/27/2025    EYE EXAM  06/01/2025          My Access Plan  Medical Emergency 911   Primary Clinic Line [facility] [phone]   24 Hour Appointment Line [phone] or  [address] [place] [address] [phone]) (toll-free)   24 Hour Nurse Line [phone] (toll-free)   Preferred Urgent Care [facility], [phone]     Preferred Hospital No data recorded   Preferred Pharmacy [facility] - [place], [place] - [address]     Behavioral Health Crisis Line The National Suicide Prevention Lifeline at 1-133.365.4224 or Text/Call 048           My Care Team Members  Patient Care Team         Relationship Specialty Notifications Start End    Lary Nicolas MD PCP - General Family Medicine  3/15/24     Phone: [phone] Fax: [phone]          [address] [facility] [address]    Marcial Rojas MD Physician Neurology  4/14/23     Phone: [phone] Fax: [phone]         [address] [place] [address]    Lary Nicolas MD Assigned PCP   4/15/23     Phone: [phone] Fax: [phone]         [address] [facility] [address]    Lucas Hernandez [facility] Therapist Licensed Mental Health  3/15/24     LEIGH faxed to the ED 3/15/24    Phone: [phone] Fax: [phone] [address] [place] [address]    Milton Denise MD Psychiatrist Psychiatry  3/15/24     LEIGH faxed to the ED 3/15/24    Phone: [phone] Fax: [phone] [address] [facility] [address]    Radha Michaud RD Registered Dietitian Diabetes Education  5/28/24     Phone: [phone] Fax: [phone]         [address] [place] [address]    Goldie Sloan OD MD Optometry  5/29/24     Phone: [phone] Fax: [phone] [address] [facility] [address]    Nancy Pineda, CHW Community Health Worker  Admissions 6/19/24     Phone: [phone]         Romaine Bowser DPM Assigned Surgical Provider   7/23/24     Phone: [phone] Fax: [phone]         [address] [facility] [address] [facility] [address]    Charly Chu MD MD Pain Medicine  8/30/24     Phone: [phone] Fax: [phone]         [address] [place] [address]    Mine Holland RD Food Resource Navigator   10/1/24     Phone: [phone] Fax: [phone]         [address] [place] [address]    Gabriela Alfonso RPH MTM Pharmacist   10/8/24     Gabriela Alfonso RPH Assigned MTM Pharmacist   3/23/25     Atilio Gomes, BSW Lead Care Coordinator  Admissions 4/18/25                 My Care Plans  Self Management and Treatment Plan    Care Plan  Care Plan: General- Seek medication review/education       Problem: HP GENERAL PROBLEM       Goal: General Goal - I would like to seek medication review/education with care teams by the next 1-2 week.        Start Date: 9/26/2024    This Visit's Progress: 90% Recent Progress: 90%    Note:     Measure of Success: NA  Barriers: Take med wrong.   Strengths: Seek medication education/review with [facility] RN  and seek further advised.   Patient expressed understanding of goal: yes    Action steps to achieve this goal:  1. I will attend appt on 9/26/2024 at 3:00PM with CCC RN via phone visit to seek med review/education and further advise. (Completed; per pt on 9/30/2024).  2. I will attend appt on 10/01/2024 at 9:00AM with [facility]  via phone visit per [facility] RN suggested to support with medicaid. (Completed)  3. I will attend appt on 11/05/2024 with Dr. Nicolas (PCP) to review ibuprofen and gabapentin med concerns and further advise. (Completed- provider is unavailable per pt on 12/05/2024)  4. I will call PCP office and the neurologist office with any other questions. (Completed: neurologist refer me to the pain clinic to help with pain management per pt on 1/15/2025)  5. I will attend appt on 12/10/2024 with Melissa Nye PA-C to review medication concerns. (Completed per pt on 1/15/2025)  6. I will continue to follow up with the pain clinic regarding to pain medication management as recommendation. (Updated: 6/12/2025)- continues  7. I will attend appt on 2/13/2025 with Dr. Nicolas in [facility] follow up DM, review due care gaps details/completion, and address any med concerns. (Completed)  8. I will attend appt on 2/28/2025 with MTM dept and seek med concern/questions if any. (Completed)  9. I will attend appt on 4/07/2025 and 9/05/2025 at 9:20AM with Dr. Nicolas in clinic as schedule. Will review med and seek medical questions if any. (Updated: 6/12/2025)-continues  10. I will connect with PCP/Dr. Nicolas office at [phone] if any questions. (Updated: 6/12/2025)- continues  11. I will attend appt on 6/19/2025 with MTM via telephone visit for follow up, review any med concerns, and discuss refill need (if any). (Updated:  6/12/2025)                                Care Plan: General       Problem: HP GENERAL PROBLEM       Goal: General Goal - I would like to find out CADI Waiver status and verify waiver eligibility/available at this time with [facility] dept by the next 1-3 months.       Start Date: 2/24/2025    This Visit's Progress: 30% Recent Progress: 30%    Note:     Measure of Success: NA  Barriers: Patient shared; seeking service/waiver to helps paying for medical appt coverage.   Strengths: Working with [facility] deptLeno for further assistance and verify CADI Waiver eligibility. Apply if hasn't yet.   Patient expressed understanding of goal: yes    Action steps to achieve this goal:  1. I will wait to hear from Leno Pineda, representative with [facility] dept. (Updated: 4/16/2025)- continues  2. I will follow up with Leno Pineda with [facility] dept at [phone] if need. (Updated: 6/12/2025)- continues  3. I will update status with Care Coordination team at the next outreach follow up. (Updated: 6/12/2025)- continues  3. I will wait to hear from Leno Pineda or [facility] CADI waiver dept to connect with me. (Updated: 6/12/2025)- continues    Note/comment:   -FYI: See CC RN encounnter dated 1/07/2025 for CC SW notes details if need. (CHW; 2/24/2025)   -CHW and pt conference call to Leno Pineda and reach voicemail. Left a voice message on 4/07/25 request Leno to return call to patient directly for update status of CADI Waiver. (CHW; 4/07/2025).   -Pt shared; pt sister is informed pt is on the CADI Waiver waitlist due to shot staff. (CHW; 4/28/2025)    (Updated: 6/12/2025)- CADI Waiver status: they are working on it per patient shared.                               Action Plans on File:                       Advance Care Plans/Directives:   Advanced Care Plan/Directives on file: No    Discussed with patient/caregiver(s): Declined Further Information             My Medical and Care  Information  Problem List   Patient Active Problem List   Diagnosis    Allergies    Vitamin D deficiency    Hyperlipidemia    Major Depression Severe Single Episode    Arnold-Chiari malformation, type II (H)    Obstructive sleep apnea    Syringomyelia (H)    Epilepsy And Recurrent Seizures    Keloid scar of skin    Dysphagia    Recurrent occipital headache    Neuropathic pain    Neurogenic bladder    Neurogenic bowel    Controlled substance agreement signed    Angular cheilitis due to bacterial infection    Cognitive deficits    Sleep disturbances    Hydrocephalus with operating  shunt (H)    Diabetes mellitus, type 2 (H)    Morbid obesity (H)    Bleeds easily    Vitamin B12 deficiency (non anemic)    Spasticity    Abnormal LFTs (liver function tests)    Transportation insecurity    Weakness of both lower extremities    Herpes keratitis of left eye    Cauda equina spinal cord injury, sequela      Current Medications:      Care Coordination Start Date: 6/14/2024   Frequency of Care Coordination: monthly, more frequently as needed     Form Last Updated: 06/17/2025

## 2025-06-17 NOTE — PROGRESS NOTES
Clinic Care Coordination Contact  Care Coordination Clinician Chart Review    Situation: Patient chart reviewed by Care Coordinator.       Background: Care Coordination Program started: 6/14/2024. Initial assessment completed and patient-centered care plan(s) were developed with participation from patient. Lead CC handed patient off to CHW for continued outreaches.       Assessment: Per chart review, patient outreach completed by CC CHW on 06/12/2025.  Patient is actively working to accomplish goal(s). Patient's goal(s) appropriate and relevant at this time. Patient is due for updated Plan of Care.  Assessments will be completed annually or as needed/with change of patient status.      Care Plan: General- Seek medication review/education       Problem: HP GENERAL PROBLEM       Goal: General Goal - I would like to seek medication review/education with care teams by the next 1-2 week.       Start Date: 9/26/2024    This Visit's Progress: 90% Recent Progress: 90%    Note:     Measure of Success: NA  Barriers: Take med wrong.   Strengths: Seek medication education/review with CCC RN  and seek further advised.   Patient expressed understanding of goal: yes    Action steps to achieve this goal:  1. I will attend appt on 9/26/2024 at 3:00PM with CCC RN via phone visit to seek med review/education and further advise. (Completed; per pt on 9/30/2024).  2. I will attend appt on 10/01/2024 at 9:00AM with [facility]  via phone visit per [facility] RN suggested to support with medicaid. (Completed)  3. I will attend appt on 11/05/2024 with Dr. Nicolas (PCP) to review ibuprofen and gabapentin med concerns and further advise. (Completed- provider is unavailable per pt on 12/05/2024)  4. I will call PCP office and the neurologist office with any other questions. (Completed: neurologist refer me to the pain clinic to help with pain management per pt on 1/15/2025)  5. I will attend appt on 12/10/2024 with Melissa Nye PA-C to  review medication concerns. (Completed per pt on 1/15/2025)  6. I will continue to follow up with the pain clinic regarding to pain medication management as recommendation. (Updated: 6/12/2025)- continues  7. I will attend appt on 2/13/2025 with Dr. Nicolas in [facility] follow up DM, review due care gaps details/completion, and address any med concerns. (Completed)  8. I will attend appt on 2/28/2025 with MTM dept and seek med concern/questions if any. (Completed)  9. I will attend appt on 4/07/2025 and 9/05/2025 at 9:20AM with Dr. Nicoals in clinic as schedule. Will review med and seek medical questions if any. (Updated: 6/12/2025)-continues  10. I will connect with PCP/Dr. Nicolas office at [phone] if any questions. (Updated: 6/12/2025)- continues  11. I will attend appt on 6/19/2025 with MTM via telephone visit for follow up, review any med concerns, and discuss refill need (if any). (Updated: 6/12/2025)                                Care Plan: General       Problem: HP GENERAL PROBLEM       Goal: General Goal - I would like to find out CADI Waiver status and verify waiver eligibility/available at this time with [facility] dept by the next 1-3 months.       Start Date: 2/24/2025    This Visit's Progress: 30% Recent Progress: 30%    Note:     Measure of Success: NA  Barriers: Patient shared; seeking service/waiver to helps paying for medical appt coverage.   Strengths: Working with [facility] deptLeno for further assistance and verify CADI Waiver eligibility. Apply if hasn't yet.   Patient expressed understanding of goal: yes    Action steps to achieve this goal:  1. I will wait to hear from Leno Pineda, representative with [facility] dept. (Updated: 4/16/2025)- continues  2. I will follow up with Leno Pineda with [facility] dept at [phone] if need. (Updated: 6/12/2025)- continues  3. I will update status with Care Coordination team at the next outreach follow up. (Updated:  6/12/2025)- continues  3. I will wait to hear from Leno Pineda or [facility] CADI waiver dept to connect with me. (Updated: 6/12/2025)- continues    Note/comment:   -FYI: See CC RN encounnter dated 1/07/2025 for CC SW notes details if need. (CHW; 2/24/2025)   -CHW and pt conference call to Leno Pineda and reach voicemail. Left a voice message on 4/07/25 request Leno to return call to patient directly for update status of CADI Waiver. (CHW; 4/07/2025).   -Pt shared; pt sister is informed pt is on the CADI Waiver waitlist due to shot staff. (CHW; 4/28/2025)    (Updated: 6/12/2025)- CADI Waiver status: they are working on it per patient shared.                                  Plan/Recommendations: The patient will continue working with Care Coordination to achieve goal(s) as above. CHW will continue outreaches at minimum every 30 days and will involve Lead CC as needed or if patient is ready to move to Maintenance. Lead CC will continue to monitor CHW outreaches and patient's progress to goal(s) every 6 weeks.     Plan of Care updated and sent to patient: Yes, via DAISHA Patrick   Social Work Primary Care Clinic Care Coordinator   [facility] [phone] [email or web address]

## 2025-06-18 ENCOUNTER — PATIENT OUTREACH (OUTPATIENT)
Dept: CARE COORDINATION | Facility: CLINIC | Age: 35
End: 2025-06-18
Payer: COMMERCIAL

## 2025-06-18 NOTE — PROGRESS NOTES
Food Resource Navigator Contact    FRN - Follow Up    Reason for call: Other: food insecurity     Intervention and Education during outreach: Called today to follow up on medically tailored prepared meals program. Pacheco says that overall it has gone well. This program is scheduled to end on June 30, 2025. Completed post-program survey with Pacheco. Pacheco says that he does not qualify for SNAP. Has continued food insecurity. Discussed community food resources, [employer] and free grocery - [employer] [place]. Sending updated information to Pacheco via email.     Food Resource Navigator Plan: Sending updated food resources to Pacheco via email.    I have discussed the following goals with the patient: Pacheco to look into additional community resources such as [employer], [employer] discounted grocery and community food resources.     Spent 14 minutes in consult with the patient.     Unique Holland   [facility] Food Resource Navigator  [employer]   [phone]

## 2025-06-19 ENCOUNTER — VIRTUAL VISIT (OUTPATIENT)
Dept: PHARMACY | Facility: CLINIC | Age: 35
End: 2025-06-19
Payer: COMMERCIAL

## 2025-06-19 DIAGNOSIS — E11.9 TYPE 2 DIABETES MELLITUS WITHOUT COMPLICATION, WITH LONG-TERM CURRENT USE OF INSULIN (H): Primary | ICD-10-CM

## 2025-06-19 DIAGNOSIS — K59.00 CONSTIPATION, UNSPECIFIED CONSTIPATION TYPE: ICD-10-CM

## 2025-06-19 DIAGNOSIS — E66.01 SEVERE OBESITY (BMI 35.0-35.9 WITH COMORBIDITY) (H): ICD-10-CM

## 2025-06-19 DIAGNOSIS — Z79.4 TYPE 2 DIABETES MELLITUS WITHOUT COMPLICATION, WITH LONG-TERM CURRENT USE OF INSULIN (H): Primary | ICD-10-CM

## 2025-06-19 PROCEDURE — 99207 PR NO CHARGE LOS: CPT | Mod: 93

## 2025-06-19 RX ORDER — SENNA AND DOCUSATE SODIUM 50; 8.6 MG/1; MG/1
2 TABLET, FILM COATED ORAL 2 TIMES DAILY
Qty: 120 TABLET | Refills: 3 | Status: SHIPPED | OUTPATIENT
Start: 2025-06-19

## 2025-06-19 NOTE — PROGRESS NOTES
"Medication Therapy Management (MTM) Encounter    ASSESSMENT:                            Medication Adherence/Access: No issues identified.    Diabetes, Weight Management  A1c is at goal of <7%.  Since Mounjaro 10 mg weekly has reduced appetite significantly and patient is tolerating well, I'd recommend staying at current dose at least until he is able to be weighed at next clinic visit.  Patient would benefit from increasing dose of senna-docusate.    PLAN:                            Continue Mounjaro 10 mg weekly.  Okay to take senna-docusate 2 tablets twice. Hold if having diarrhea.     Follow-up: with PCP on 9/5/25 and with MTM on:  Friday Oct 3, 2025   Appt at 1:00 PM  Telephone      SUBJECTIVE/OBJECTIVE:                          Pacheco David is a 34 year old male seen for a follow-up visit.       Reason for visit: Mounjaro follow up.    Allergies/ADRs: Reviewed in chart  Past Medical History: Reviewed in chart  Tobacco: He reports that he has never smoked. He has never been exposed to tobacco smoke. He has never used smokeless tobacco.  Alcohol: none    Medication Adherence/Access: no issues reported.    Diabetes   , Weight Management  Mounjaro 10 mg weekly on Tuesdays  Has given 2 doses of Mounjaro 10 mg and is tolerating it well for the most part. He is experiencing constipation, but senna-docusate is  helping. Constipation is not worse with increased dose of Mounjaro, is having 2-3 bowel movements per week. He reports appetite is much lower with increased dose. He isn't sure how much weight he's lost because he's in a wheel chair/unable to stand.  Patient is not experiencing any other side effects.  Blood sugar monitoring: Patient isn't monitoring his blood sugar regularly. He was prescribed CGM, but it wasn't covered by insurance because he isn't using insulin. He reports no symptoms of hypoglycemia.  Diet/exercise: Reports eating a \"diabetic diet\", lunch and dinner are typically meals from [employer]. He might " have an apple or banana between lunch and dinner. He is unable to get regular exercise due to physical limitations.    Medication considerations:  Metformin caused diarrhea  Ozempic wasn't very effective     Eye exam in the last 12 months? No  Foot exam: due    Today's Vitals: There were no vitals taken for this visit.  ----------------    I spent 40 minutes with this patient today. All changes were made via collaborative practice agreement with Lary Nicolas MD.     A summary of these recommendations was sent via [employer].    Gabriela Alfonso (Hailie), ZahraD, MPH  Medication Therapy Management Pharmacist     Telemedicine Visit Details  The patient's medications can be safely assessed via a telemedicine encounter.  Type of service:  Telephone visit  Originating Location (pt. Location): Home    Distant Location (provider location):  On-site  Start Time: 1:00 PM  End Time: 1:40 PM     Medication Therapy Recommendations  Constipation, unspecified constipation type   1 Current Medication: SENNA-docusate sodium (SENNA S) 8.6-50 MG tablet (Discontinued)   Current Medication Sig: Take 2 tablets by mouth at bedtime. Hold if diarrhea   Rationale: Dose too low - Dosage too low - Effectiveness   Recommendation: Increase Dose   Status: Accepted per CPA   Identified Date: 6/19/2025 Completed Date: 6/19/2025

## 2025-06-19 NOTE — Clinical Note
Blair Nicolas,  Just an FYI - Pacheco will continue with Mounjaro 10 mg weekly and I will follow up with him in a few months.  Thanks! Gabriela Alfonso (Hailie), PharmD, MPH Medication Therapy Management Pharmacist

## 2025-06-23 ENCOUNTER — PATIENT OUTREACH (OUTPATIENT)
Dept: CARE COORDINATION | Facility: CLINIC | Age: 35
End: 2025-06-23
Payer: COMMERCIAL

## 2025-06-23 ENCOUNTER — TELEPHONE (OUTPATIENT)
Dept: FAMILY MEDICINE | Facility: CLINIC | Age: 35
End: 2025-06-23
Payer: COMMERCIAL

## 2025-06-23 DIAGNOSIS — R25.2 SPASTICITY: ICD-10-CM

## 2025-06-23 DIAGNOSIS — R29.898 WEAKNESS OF BOTH LOWER EXTREMITIES: Primary | ICD-10-CM

## 2025-06-23 NOTE — Clinical Note
"FYI Received VM from patient 6-23-25 at 12:51pm stated \"I need to talk to you. no one at the clinic understand what I need so  I need to talk you.\" Call back. [phone]  I called pt back but phone is invalid. Jase Dasilva  Community Health Worker (Covering for AMBERLY Vargas) [facility] Care Coordination [email or web address]  Office: [phone] Fax: [phone] "

## 2025-06-23 NOTE — LETTER
June 24, 2025      Pacheco David  [address] [facility] [address]        To Whom It May Concern,           Sincerely,        Lary Nicolas MD    Electronically signed

## 2025-06-23 NOTE — LETTER
July 8, 2025    Pacheco David  [address] [address]        To Whom It May Concern:    I am writing on behalf of my patient, Pacheco David, to support the medical necessity of a wheeled walker with a seat. I am his primary care physician. I have known him for years and am well-familiar with his condition.    Mr. David has been diagnosed with syringomyelia, Arnold Chiari Malformation Type II, chronic neuropathic pain, muscle spasticity in the lower extremities, and hydrocephalus with a ventriculoperitoneal () shunt. These complex neurological conditions result in chronic severe pain, significant muscle spasticity, and muscle weakness in the lower extremities, which substantially impair his ability to ambulate safely and independently.    Due to these impairments, Mr. David is at elevated risk for falls, pain, and fatigue during walking. A walker with wheels and a seat is medically necessary to provide the stability and support required for safe ambulation. The seat feature is essential as it allows him to rest intermittently to manage pain and fatigue without needing to find external seating, which is often unavailable or inaccessible.    Mr. David previously had a similar walker, which proved beneficial for his mobility and safety. However, the walker is now broken, cannot be repaired, and is no longer usable. Without a suitable replacement, the patient s mobility, independence, and overall quality of life are significantly compromised.    In my professional opinion, a wheeled walker with a seat is medically appropriate and essential for the safe management of his condition and daily functioning.    Please feel free to contact me should you require any additional information to support this request.    Sincerely,        Lary Nicolas M.D.   Family Physician  [facility]  Electronically signed 7/8/2025 3:30 PM

## 2025-06-23 NOTE — TELEPHONE ENCOUNTER
Forms/Letter Request    Type of form/letter: OTHER: Waiver letter for recommendation for walker per pt       Do we have the form/letter: No, letter needs to be written     When is form/letter needed by: asap    How would you like the form/letter returned: N/A    Patient Notified form requests are processed in 5-7 business days:Yes    Could we send this information to you in [place] or would you prefer to receive a phone call?:   Patient would prefer a phone call   Okay to leave a detailed message?: Yes at Cell number on file:    Telephone Information:   Mobile [phone]   Mobile Not on file.

## 2025-06-24 NOTE — TELEPHONE ENCOUNTER
Patient reported his walker broke on the left side  Not able to use break  Pt's sister told him he can get a CADI waiver to provide a walker if PCP writes a letter recommending a walker    Routed to Dr. Nicolas/HELDER to review    Varun MURILLO RN  [facility] Primary Care [facility]

## 2025-06-24 NOTE — PROGRESS NOTES
"6/23/2025  Clinic Care Coordination Contact  [facility]/Renea  Received VM from patient 6-23-25 at 12:51pm stated \"I need to talk to you. no one at the clinic understand what I need so  I need to talk you.\" Call back. [phone]    Clinical Data: Care Coordinator Outreach    Outreach Documentation Number of Outreach Attempt   6/24/2025  10:05 AM 1     Phone number is invalid  Unable to leave a message due to: Phone number is invalid.    Plan: Routed to assigned CHW to review VM     Jase Dasilva  Community Health Worker(Covering for Nancy Pineda CHW)  [facility] Care Coordination  [email or web address]   Office: [phone]  Fax: [phone]      "

## 2025-06-25 NOTE — PATIENT INSTRUCTIONS
"Recommendations from today's MTM visit:                                                       Continue Mounjaro 10 mg weekly.  Okay to take senna-docusate 2 tablets twice. Hold if having diarrhea.      Follow-up: with PCP on 9/5/25 and with MTM on:  Friday Oct 3, 2025   Appt at 1:00 PM  Telephone      It was great speaking with you today.  I value your experience and would be very thankful for your time in providing feedback in our clinic survey. In the next few days, you may receive an email or text message from "[employer] with a link to a survey related to your  clinical pharmacist.\"     To schedule another MTM appointment, please call the clinic directly or you may call the MTM scheduling line at [phone] or toll-free at [phone].     My Clinical Pharmacist's contact information:                                                      Please feel free to contact me with any questions or concerns you have.      Gabriela Alfonso (Hailie), ZahraD, MPH  Medication Therapy Management Pharmacist    "

## 2025-07-01 ENCOUNTER — VIRTUAL VISIT (OUTPATIENT)
Dept: FAMILY MEDICINE | Facility: CLINIC | Age: 35
End: 2025-07-01
Payer: COMMERCIAL

## 2025-07-01 DIAGNOSIS — K59.00 CONSTIPATION, UNSPECIFIED CONSTIPATION TYPE: ICD-10-CM

## 2025-07-01 PROCEDURE — 98005 SYNCH AUDIO-VIDEO EST LOW 20: CPT | Performed by: STUDENT IN AN ORGANIZED HEALTH CARE EDUCATION/TRAINING PROGRAM

## 2025-07-01 RX ORDER — BISACODYL 10 MG
10 SUPPOSITORY, RECTAL RECTAL DAILY PRN
Qty: 30 SUPPOSITORY | Refills: 1 | Status: SHIPPED | OUTPATIENT
Start: 2025-07-01

## 2025-07-01 RX ORDER — POLYETHYLENE GLYCOL 3350 17 G/17G
17 POWDER, FOR SOLUTION ORAL DAILY
Qty: 238 G | Refills: 1 | Status: SHIPPED | OUTPATIENT
Start: 2025-07-01

## 2025-07-01 ASSESSMENT — PATIENT HEALTH QUESTIONNAIRE - PHQ9
SUM OF ALL RESPONSES TO PHQ QUESTIONS 1-9: 0
10. IF YOU CHECKED OFF ANY PROBLEMS, HOW DIFFICULT HAVE THESE PROBLEMS MADE IT FOR YOU TO DO YOUR WORK, TAKE CARE OF THINGS AT HOME, OR GET ALONG WITH OTHER PEOPLE: NOT DIFFICULT AT ALL
SUM OF ALL RESPONSES TO PHQ QUESTIONS 1-9: 0

## 2025-07-01 NOTE — TELEPHONE ENCOUNTER
First attempt: left voicemail requesting call back.     please inform patient walker has been ordered--ask how he'd like to get this (fax, mail,  at , etc)  Please also ask if just order for walker is sufficient--looks like he requested letter for recommendation for walker but this was not done, only an order was placed    Natasha Welch RN on 7/1/2025 at 9:02 AM

## 2025-07-01 NOTE — TELEPHONE ENCOUNTER
"Dr. Nicolas/HELDER--- please write a letter supporting that pt needs a walker so they can send it to  to help with CADI waiver so he can get a walker. Thank you!      Pt returned call. Writer relayed message below to pt, but it was a little confused to him so he had sister, Airam (CTC on file) spoke with writer. Sister reported that they need a letter supporting that pt needs a walker.     According to Nurse Varun's notes: \"Patient reported his walker broke on the left side. Not able to use break  Pt's sister told him he can get a CADI waiver to provide a walker if PCP writes a letter recommending a walker.\"     They will send this letter to pt's  to help with a CADI waiver to get the walker. They are currently working with pt's  to find a supplier for the walker. Writer informed that the DME order was signed for [facility] [employer] as the supplier, but sister reported that they already have supplier, although they are still looking through different suppliers with .     Writer informed pt and sister that will send this back to provider to write a letter supporting that pt needs walker, and they can come  the letter at the . Writer also advised them to  a copy of the DME order just in case (although they might already have a supplier in mind). They agreed to plan of care.    Team--- please call pt again once letter is completed. His sister will  a physical copy of the letter and the DME order at the . Thank you!    Christiano David, BSN, PHN, RN-[facility]     "

## 2025-07-01 NOTE — PROGRESS NOTES
Pacheco is a 34 year old who is being evaluated via a billable video visit.    How would you like to obtain your AVS? MyChart  If the video visit is dropped, the invitation should be resent by: Text to cell phone: [phone]  Will anyone else be joining your video visit? No      Pacheco was seen today for constipation.    Diagnoses and all orders for this visit:    Constipation, unspecified constipation type  Chronic, per chart review has MiraLAX and senna.  Reports that he is using both MiraLAX and senna.  Last bowel movement 2 to 3 days ago, seems typical for pt.  Is passing gas.  Needs refill of MiraLAX.  Patient is on Mounjaro, however per review of MTM note, constipation did not change with increased dose of Mounjaro   - discussed lifestyle changes including increasing intake of fruits, vegetables, high-fiber food, drink plenty of fluids and having regular physical activity.  -     polyethylene glycol (MIRALAX) 17 GM/Dose powder; Take 17 g by mouth daily. As needed for constipation  -     Add bisacodyl (DULCOLAX) 10 MG suppository; Place 1 suppository (10 mg) rectally daily as needed for constipation if MiraLAX and senna are not working  - RTC 2 weeks in person if not improved          Subjective   Pacheco is a 34 year old, presenting for the following health issues:  Constipation (3 days)      Via the Health Maintenance questionnaire, the patient has reported the following services have been completed -Eye Exam: hospitals eye [facility] 2025-06-14, this information has been sent to the abstraction team.  Video Start Time: 5:34 PM    History of Present Illness       Reason for visit:  Constipation    He eats 0-1 servings of fruits and vegetables daily.He consumes 2 sweetened beverage(s) daily.He exercises with enough effort to increase his heart rate 9 or less minutes per day.  He exercises with enough effort to increase his heart rate 3 or less days per week.   He is taking medications regularly.     "    Constipation  Onset/Duration: 3 days  Description:  Frequency of bowel movements: on a daily basis  Consistency of stool: runny  Progression of Symptoms: worsening  Accompanying signs and symptoms:    Abdominal pain: No   Rectal pain: No   Blood in stool: No   Nausea/Vomiting: No   Weight loss or gain: No  History:   Similar problems in past: No  History of abdominal surgery: No  Chronic laxative use: No  New medications: No  Precipitating or alleviating factors: none  Therapies tried and outcome: None    Has senna and miralax.   On monjaro.     Reprots that when he eats food, feels like he needs to have a BM, but is straining. Not always able to have BM.   Has to go back and forth to the bathroom.   Reports using both miralax and senna.   Last BM was 2-3 days ago.   Still passing gas.     Per MTM note, constipation was not worse with increased mounjaro.       Objective    Vitals - Patient Reported  Weight (Patient Reported): 97.5 kg (215 lb)  Height (Patient Reported): 167.6 cm (5' 6\")  BMI (Based on Pt Reported Ht/Wt): 34.7        Physical Exam   GENERAL: alert and no distress  EYES: Eyes grossly normal to inspection.  No discharge or erythema, or obvious scleral/conjunctival abnormalities.  RESP: No audible wheeze, cough, or visible cyanosis.    SKIN: Visible skin clear. No significant rash, abnormal pigmentation or lesions.  NEURO: Cranial nerves grossly intact.  Mentation and speech appropriate for age.  PSYCH: Appropriate affect, tone, and pace of words          Video-Visit Details    Type of service:  Video Visit   Video End Time:5:45 PM  Originating Location (pt. Location): Home    Distant Location (provider location):  On-site  Platform used for Video Visit: Lamont  Signed Electronically by: Nancy Mandujano MD    "

## 2025-07-03 ENCOUNTER — MYC REFILL (OUTPATIENT)
Dept: FAMILY MEDICINE | Facility: CLINIC | Age: 35
End: 2025-07-03
Payer: COMMERCIAL

## 2025-07-03 DIAGNOSIS — R25.2 SPASTICITY: ICD-10-CM

## 2025-07-03 RX ORDER — BACLOFEN 10 MG/1
10 TABLET ORAL 3 TIMES DAILY
Qty: 100 TABLET | Refills: 11 | OUTPATIENT
Start: 2025-07-03

## 2025-07-07 DIAGNOSIS — E11.9 TYPE 2 DIABETES MELLITUS WITHOUT COMPLICATION, WITHOUT LONG-TERM CURRENT USE OF INSULIN (H): ICD-10-CM

## 2025-07-07 DIAGNOSIS — M79.2 NEUROPATHIC PAIN: ICD-10-CM

## 2025-07-07 DIAGNOSIS — E53.8 VITAMIN B12 DEFICIENCY (NON ANEMIC): ICD-10-CM

## 2025-07-07 RX ORDER — ACETAMINOPHEN 500 MG
1000 TABLET ORAL 3 TIMES DAILY
Qty: 200 TABLET | Refills: 0 | Status: SHIPPED | OUTPATIENT
Start: 2025-07-07

## 2025-07-07 RX ORDER — LANCING DEVICE/LANCETS
KIT MISCELLANEOUS
Qty: 1 EACH | Refills: 0 | Status: SHIPPED | OUTPATIENT
Start: 2025-07-07

## 2025-07-07 RX ORDER — LANOLIN ALCOHOL/MO/W.PET/CERES
1000 CREAM (GRAM) TOPICAL DAILY
Qty: 90 TABLET | Refills: 4 | OUTPATIENT
Start: 2025-07-07

## 2025-07-07 NOTE — TELEPHONE ENCOUNTER
Medication Question or Refill    Contacts       Contact Date/Time Type Contact Phone/Fax    07/07/2025 11:21 AM CDT Phone (Incoming) Pacheco David (Self) [phone] (M)            What medication are you calling about (include dose and sig)?: cyanocobalamin (VITAMIN B-12) 1000 MCG tablet      acetaminophen (TYLENOL) 500 MG tablet  1,000 mg, 3 TIMES DAILY 0 ordered          Edit       Preferred Pharmacy:   [facility] - [place], [place] - [address] [place] [address] Pkw[address]  [address]  [address]  [place] [address]  Phone: [phone] Fax: [phone]      Controlled Substance Agreement on file:   CSA -- Patient Level:    CSA: None found at the patient level.       Who prescribed the medication?: pcp    Do you need a refill? Yes    When did you use the medication last? today    Patient offered an appointment? No    Do you have any questions or concerns?  No      Could we send this information to you in [facility] or would you prefer to receive a phone call?:   Patient would prefer a phone call   Okay to leave a detailed message?: Yes at Home number on file [phone] (home)

## 2025-07-08 ENCOUNTER — TELEPHONE (OUTPATIENT)
Dept: FAMILY MEDICINE | Facility: CLINIC | Age: 35
End: 2025-07-08
Payer: COMMERCIAL

## 2025-07-08 NOTE — TELEPHONE ENCOUNTER
1st: Left voicemail for patient requesting return call to clinic.  If patient calls back, let him know letter was sent via [employer] and copy of letter + DME order are at clinic  awaiting pick-up.  Of note, CTC in chart is from 2019. Patient may need to complete new CTC form for sister to  items unless patient is present.    Deepti Abernathy RN  [facility]

## 2025-07-08 NOTE — TELEPHONE ENCOUNTER
Patient Returning Call    Reason for call:  Patient    Information relayed to patient:        Addendum         1st: Left voicemail for patient requesting return call to clinic.  If patient calls back, let him know letter was sent via [employer] and copy of letter + DME order are at clinic  awaiting pick-up.  Of note, CTC in chart is from 2019. Patient may need to complete new CTC form for sister to  items unless patient is present.     Deepti Abernathy RN  [facility]                 Patient has additional questions:  No   Patient will come tomorrow to pick forms.    Gino Tipton RN   [facility]

## 2025-07-08 NOTE — TELEPHONE ENCOUNTER
[facility]. Please let the patient know and make sure it meets his needs.

## 2025-07-09 DIAGNOSIS — E55.9 VITAMIN D DEFICIENCY: ICD-10-CM

## 2025-07-09 NOTE — TELEPHONE ENCOUNTER
Medication Question or Refill    What medication are you calling about (include dose and sig)?: vitamin D3 (CHOLECALCIFEROL) 50 mcg (2000 units) tablet     Preferred Pharmacy:  [id number] [address]  [address] [place] [address]  Phone: [phone] Fax: [phone]    Who prescribed the medication?: Lary Nicolas MD    Do you need a refill? Yes    Do you have any questions or concerns?  Yes: I am out of my vitamin D and would like a refill    Could we send this information to you in [facility] or would you prefer to receive a phone call?:   Patient would prefer a phone call   Okay to leave a detailed message?: No at Home number on file [phone] (home)     Routed to Dr. Nicolas to review  Pended    .ppk

## 2025-07-09 NOTE — TELEPHONE ENCOUNTER
Called to follow up. Pt reported he and his sister picked up the letter & DME order at the clinic today. They also filled out a new CTC form.    Varun MURILLO RN  [facility]

## 2025-07-10 RX ORDER — CHOLECALCIFEROL (VITAMIN D3) 50 MCG
1 TABLET ORAL DAILY
Qty: 100 TABLET | Refills: 4 | Status: SHIPPED | OUTPATIENT
Start: 2025-07-10

## 2025-07-13 ENCOUNTER — HEALTH MAINTENANCE LETTER (OUTPATIENT)
Age: 35
End: 2025-07-13

## 2025-07-16 ENCOUNTER — PATIENT OUTREACH (OUTPATIENT)
Dept: CARE COORDINATION | Facility: CLINIC | Age: 35
End: 2025-07-16
Payer: COMMERCIAL

## 2025-07-16 NOTE — PROGRESS NOTES
Clinic Care Coordination Contact:  Community Health Worker Follow Up    Today's date: 7/16/2025    Reason(s):   -CC CHW Follow Up Call (return call and follow up current goals)    Voicemail check:  Patient left an voice message on writer/CHW voicemail on Monday, 7/15/2025 to connect with him about appt cancellation due to health care insurance change.     Care Gaps:   Health Maintenance Due   Topic Date Due    YEARLY PREVENTIVE VISIT  08/09/2002    URINE DRUG SCREEN  04/07/2024    MICROALBUMIN  03/27/2025    DIABETIC FOOT EXAM  03/27/2025    A1C  07/07/2025     Suggested pt to review this with PCP/PCP providers team at the next office visit and may call C with appt scheduling if need.    Care Plan:   Care Plan: General- Seek medication review/education       Problem: HP GENERAL PROBLEM       Goal: General Goal - I would like to seek medication review/education with care teams by the next 1-2 week.       Start Date: 9/26/2024    This Visit's Progress: 90% Recent Progress: 90%    Note:     Measure of Success: NA  Barriers: Take med wrong.   Strengths: Seek medication education/review with CCC RN  and seek further advised.   Patient expressed understanding of goal: yes    Action steps to achieve this goal:  1. I will attend appt on 9/26/2024 at 3:00PM with CCC RN via phone visit to seek med review/education and further advise. (Completed; per pt on 9/30/2024).  2. I will attend appt on 10/01/2024 at 9:00AM with [facility]  via phone visit per [facility] RN suggested to support with medicaid. (Completed)  3. I will attend appt on 11/05/2024 with Dr. Nicolas (PCP) to review ibuprofen and gabapentin med concerns and further advise. (Completed- provider is unavailable per pt on 12/05/2024)  4. I will call PCP office and the neurologist office with any other questions. (Completed: neurologist refer me to the pain clinic to help with pain management per pt on 1/15/2025)  5. I will attend appt on 12/10/2024 with [facility]  Melissa Forde PA-C to review medication concerns. (Completed per pt on 1/15/2025)  6. I will continue to follow up with the pain clinic regarding to pain medication management as recommendation. (Updated: 6/12/2025)- continues  7. I will attend appt on 2/13/2025 with Dr. Nicolas in [facility] follow up DM, review due care gaps details/completion, and address any med concerns. (Completed)  8. I will attend appt on 2/28/2025 with MTM dept and seek med concern/questions if any. (Completed)  9. I will attend appt on 4/07/2025 and 9/05/2025 at 9:20AM with Dr. Nicolas in clinic as schedule. Will review med and seek medical questions if any. (Updated: 6/12/2025)-continues  10. I will connect with PCP/Dr. Nicolas office at [phone] if any questions. (Updated: 6/12/2025)- continues  11. I will attend appt on 6/19/2025 with MTM via telephone visit for follow up, review any med concerns, and discuss refill need (if any). (Updated: 6/12/2025)    (Updated: 7/16/2025)- not able to follow up on goal due to coordinate care per patient request.                                 Care Plan: General       Problem: HP GENERAL PROBLEM       Goal: General Goal - I would like to find out CADI Waiver status and verify waiver eligibility/available at this time with [facility] dept by the next 1-3 months.       Start Date: 2/24/2025    This Visit's Progress: 30% Recent Progress: 30%    Note:     Measure of Success: NA  Barriers: Patient shared; seeking service/waiver to helps paying for medical appt coverage.   Strengths: [facility] for further assistance and verify CADI Waiver eligibility. Apply if hasn't yet.   Patient expressed understanding of goal: yes    Action steps to achieve this goal:  1. I will wait to hear from Leno Pineda, representative with [facility] dept. (Updated: 4/16/2025)- continues  2. I will follow up with Leno Pineda with [facility] dept at [phone] if need.  "(Updated: 6/12/2025)- continues  3. I will update status with Care Coordination team at the next outreach follow up. (Updated: 6/12/2025)- continues  3. I will wait to hear from Leno Pineda or [facility] CADI waiver dept to connect with me. (Updated: 6/12/2025)- continues    Note/comment:   -FYI: See CC RN encounnter dated 1/07/2025 for CC SW notes details if need. (CHW; 2/24/2025)   -CHW and pt conference call to Leno Pineda and reach voicemail. Left a voice message on 4/07/25 request Leno to return call to patient directly for update status of CADI Waiver. (CHW; 4/07/2025).   -Pt shared; pt sister is informed pt is on the CADI Waiver waitlist due to shot staff. (CHW; 4/28/2025)    (Updated: 6/12/2025)- CADI Waiver status: they are working on it per patient shared.   (Updated: 7/16/2025)- not able to follow up on goal due to coordinate care per patient request.                             Patient Outreach Discussion:   CC CHW was able to connect with patient today regard to reason(s) above. Check in how patient is doing and any questions or concerns at this time.      Patient shared:  -Approved for [employer] transportation; pay little out of pocket for each ride. Health care insurance changed from [employer] to [facility] and Medicare.     Coordinate Care:   Reason: Verified/report patient health care insurance change with [employer] per patient request    3:18PM: [facility] Talked with representative, Trinity. CHW explained reason of call. Trinity is aware pt is on the line and speak English. Pt confirmed.      Trinity shared:   -[employer] do not work directly with client health care insurance.  -Patient ID# with [employer] is \"[id number].\"  -Patient can apply the \"Go-to cart.\" Patient can make deposit to this card at anytime and can be use for any transportation service. Pt can call phone number [phone] through [employer] to seek " "further information.  -Patient will need to send a copy of [place] photo ID to [facility] by one of the options to create an \"Go-To-Card\" with fund to be issue to patient via mail. Options: may send copy of [place] photo ID to us via e-mail to [facility] or go in-person to our office. Pt may go in-person to one of our transportation station to make deposit to the to-go card.     Coordinate Care Continues:   3:31PM: CHW and pt conference call to [facility] [employer] per patient request. Spoke with Shane with Customer Relations dept. CHW explained reason of call. Pt Shane completed questions together including secret questions (in case card missing).     Shane shared to pt:   -You will need to contact [facility] to put money on the Go-To-Card and how to create this.  -I can send you an \"Blank Go-To-Card\" with no fund on it. For faster process, you can go online to make fund deposit to the blank to-go card; or make deposit in-person at one of our transit station.   -Will takes about 7 business days for pt to receive the \"blank go-to card\" via mail.     Note/comment:       CHW suggestions for patient today:  -Pt may connect CCC/CHW with any additional resource needs and PCP office for scheduling appt.  -Pt to follow Yenni instructions. May call them back with any questions or clarity need.      CHW Next Follow-up: Goal(s) follow up.     Outreach frequency: monthly      CHW Plan:   -CHW review [facility] discount plan options and send resource to patient via mail per patient agreed.   -Next CHW outreach plan: 1 month to monitor the progression of their goal(s).            "

## 2025-07-30 ENCOUNTER — PATIENT OUTREACH (OUTPATIENT)
Dept: CARE COORDINATION | Facility: CLINIC | Age: 35
End: 2025-07-30
Payer: COMMERCIAL

## 2025-08-04 ENCOUNTER — MYC REFILL (OUTPATIENT)
Dept: FAMILY MEDICINE | Facility: CLINIC | Age: 35
End: 2025-08-04
Payer: COMMERCIAL

## 2025-08-04 DIAGNOSIS — M79.2 NEUROPATHIC PAIN: ICD-10-CM

## 2025-08-04 RX ORDER — IBUPROFEN 800 MG/1
800 TABLET, FILM COATED ORAL EVERY 8 HOURS PRN
Qty: 100 TABLET | Refills: 0 | Status: SHIPPED | OUTPATIENT
Start: 2025-08-04

## 2025-08-04 RX ORDER — ACETAMINOPHEN 500 MG
1000 TABLET ORAL 3 TIMES DAILY
Qty: 200 TABLET | Refills: 0 | Status: SHIPPED | OUTPATIENT
Start: 2025-08-04

## 2025-08-19 ENCOUNTER — VIRTUAL VISIT (OUTPATIENT)
Dept: PHARMACY | Facility: CLINIC | Age: 35
End: 2025-08-19

## 2025-08-19 DIAGNOSIS — K59.00 CONSTIPATION, UNSPECIFIED CONSTIPATION TYPE: Primary | ICD-10-CM

## 2025-08-19 DIAGNOSIS — E66.01 SEVERE OBESITY (BMI 35.0-35.9 WITH COMORBIDITY) (H): ICD-10-CM

## 2025-08-19 DIAGNOSIS — E11.9 TYPE 2 DIABETES MELLITUS WITHOUT COMPLICATION, WITH LONG-TERM CURRENT USE OF INSULIN (H): ICD-10-CM

## 2025-08-19 DIAGNOSIS — Z79.4 TYPE 2 DIABETES MELLITUS WITHOUT COMPLICATION, WITH LONG-TERM CURRENT USE OF INSULIN (H): ICD-10-CM

## 2025-08-19 PROCEDURE — 99207 PR NO CHARGE LOS: CPT | Mod: 95

## 2025-08-22 ENCOUNTER — MYC REFILL (OUTPATIENT)
Dept: FAMILY MEDICINE | Facility: CLINIC | Age: 35
End: 2025-08-22
Payer: COMMERCIAL

## 2025-08-22 DIAGNOSIS — R25.2 SPASTICITY: ICD-10-CM

## 2025-08-23 RX ORDER — BACLOFEN 10 MG/1
10 TABLET ORAL 3 TIMES DAILY
Qty: 100 TABLET | Refills: 11 | OUTPATIENT
Start: 2025-08-23

## 2025-08-27 ENCOUNTER — TELEPHONE (OUTPATIENT)
Dept: FAMILY MEDICINE | Facility: CLINIC | Age: 35
End: 2025-08-27
Payer: COMMERCIAL